# Patient Record
Sex: FEMALE | Race: WHITE | Employment: OTHER | RURAL
[De-identification: names, ages, dates, MRNs, and addresses within clinical notes are randomized per-mention and may not be internally consistent; named-entity substitution may affect disease eponyms.]

---

## 2018-06-12 ENCOUNTER — OFFICE VISIT (OUTPATIENT)
Dept: CARDIOLOGY CLINIC | Age: 68
End: 2018-06-12

## 2018-06-12 VITALS
WEIGHT: 180 LBS | SYSTOLIC BLOOD PRESSURE: 158 MMHG | DIASTOLIC BLOOD PRESSURE: 94 MMHG | HEIGHT: 64 IN | HEART RATE: 78 BPM | RESPIRATION RATE: 14 BRPM | BODY MASS INDEX: 30.73 KG/M2 | OXYGEN SATURATION: 97 %

## 2018-06-12 DIAGNOSIS — I10 HYPERTENSION, UNSPECIFIED TYPE: ICD-10-CM

## 2018-06-12 DIAGNOSIS — R07.89 OTHER CHEST PAIN: Primary | ICD-10-CM

## 2018-06-12 DIAGNOSIS — E78.5 DYSLIPIDEMIA: ICD-10-CM

## 2018-06-12 RX ORDER — AMLODIPINE BESYLATE 2.5 MG/1
2.5 TABLET ORAL 2 TIMES DAILY
Qty: 180 TAB | Refills: 1 | Status: SHIPPED | OUTPATIENT
Start: 2018-06-12 | End: 2019-02-01 | Stop reason: SDUPTHER

## 2018-06-12 RX ORDER — DIPHENHYDRAMINE HCL 25 MG
25 CAPSULE ORAL
COMMUNITY

## 2018-06-12 RX ORDER — LEVOTHYROXINE SODIUM 75 UG/1
50 TABLET ORAL
COMMUNITY
End: 2022-04-06 | Stop reason: DRUGHIGH

## 2018-06-12 RX ORDER — ROSUVASTATIN CALCIUM 10 MG/1
10 TABLET, COATED ORAL
COMMUNITY
End: 2022-02-22 | Stop reason: SINTOL

## 2018-06-12 RX ORDER — TRISODIUM CITRATE DIHYDRATE AND CITRIC ACID MONOHYDRATE 500; 334 MG/5ML; MG/5ML
20 SOLUTION ORAL 2 TIMES DAILY
COMMUNITY
End: 2019-07-15 | Stop reason: ALTCHOICE

## 2018-06-12 NOTE — MR AVS SNAPSHOT
[facility] 
 
 
 [address] [facility] [address] [phone] Patient: Lynnette Mclaughlin MRN: [id number] Visit Information Date & Time Provider Department Dept. Phone Encounter #  
 6/12/2018 10:20 AM Kristen Kaplan, [address] [place] [address] Cardiology [facility] [phone] [id number] Upcoming Health Maintenance Date Due Hepatitis C Screening 1950 DTaP/Tdap/Td series (1 - Tdap) 1/23/1972 FOBT Q 1 YEAR AGE 50-75 1/23/2001 ZOSTER VACCINE AGE 60> 11/23/2010 GLAUCOMA SCREENING Q2Y 1/23/2016 Bone Densitometry (Dexa) Screening 1/23/2016 Pneumococcal 65+ Low/Medium Risk (1 of 2 - PCV13) 1/23/2016 Influenza Age 5 to Adult 8/1/2018 BREAST CANCER SCRN MAMMOGRAM 5/25/2020 Allergies as of 6/12/2018  Review Complete On: 6/12/2018 By: Kristen Kaplan MD  
  
 Severity Noted Reaction Type Reactions Mold High 06/12/2018    Other (comments) Asthma symptoms Pcn [Penicillins] High 04/07/2017    Anaphylaxis Current Immunizations  Never Reviewed No immunizations on file. Not reviewed this visit You Were Diagnosed With   
  
 Codes Comments Other chest pain    -  Primary ICD-10-CM: R07.89 ICD-9-CM: 786.59 Hypertension, unspecified type     ICD-10-CM: I10 
ICD-9-CM: 401.9 Dyslipidemia     ICD-10-CM: E78.5 ICD-9-CM: 272.4 Vitals BP Pulse Resp Height(growth percentile) Weight(growth percentile) SpO2  
 (!) 158/94 (BP 1 Location: Right arm, BP Patient Position: Sitting) 78 14 5' 3.5\" (1.613 m) 180 lb (81.6 kg) 97% BMI OB Status Smoking Status 31.39 kg/m2 Hysterectomy Former Smoker Vitals History BMI and BSA Data Body Mass Index Body Surface Area  
 31.39 kg/m 2 1.91 m 2 Preferred Pharmacy Pharmacy Name Phone [address] [place] [address] [phone] Your Updated Medication List  
  
 This list is accurate as of 6/12/18 11:39 AM.  Always use your most recent med list.  
  
  
  
  
 albuterol 2.5 mg /3 mL (0.083 %) nebulizer solution Commonly known as:  PROVENTIL VENTOLIN  
2.5 mg by Nebulization route every four (4) hours as needed for Wheezing. AMBIEN 5 mg tablet Generic drug:  zolpidem Take 10 mg by mouth nightly as needed for Sleep. amLODIPine 2.5 mg tablet Commonly known as:  John Matar Take 1 Tab by mouth two (2) times a day. aspirin 325 mg tablet Commonly known as:  ASPIRIN Take 400 mg by mouth daily. Anacin BENADRYL 25 mg capsule Generic drug:  diphenhydrAMINE Take 25 mg by mouth every six (6) hours as needed. CO Q-10 PO Take 200 mg by mouth daily. CRESTOR 10 mg tablet Generic drug:  rosuvastatin Take 10 mg by mouth nightly. cyclobenzaprine 5 mg tablet Commonly known as:  FLEXERIL Take 5 mg by mouth two (2) times daily as needed for Muscle Spasm(s). estropipate 0.75 mg tablet Commonly known as:  OGEN Take 0.75 mg by mouth daily. HYDROcodone-acetaminophen 5-325 mg per tablet Commonly known as:  Billye Peru Take 1-2 Tabs by mouth every six (6) hours as needed for Pain.  
  
 levothyroxine 75 mcg tablet Commonly known as:  SYNTHROID Take  by mouth Daily (before breakfast). montelukast 10 mg tablet Commonly known as:  SINGULAIR Take 10 mg by mouth daily. multivitamin tablet Commonly known as:  ONE A DAY Take 1 Tab by mouth daily. Pure Multivitamin  
  
 pilocarpine 5 mg tablet Commonly known as:  Clayton Croak Take 5 mg by mouth three (3) times daily. sodium citrate-citric acid 500-334 mg/5 mL solution Commonly known as:  PrismaStar Take 20 mL by mouth two (2) times a day. topiramate 25 mg tablet Commonly known as:  TOPAMAX Take 25 mg by mouth three (3) times daily. Prescriptions Sent to Pharmacy  Refills  
 amLODIPine (NORVASC) 2.5 mg tablet 1  
 Sig: Take 1 Tab by mouth two (2) times a day. Class: Normal  
 Pharmacy: [facility], [address] [place] [address] #: [phone] Route: Oral  
  
We Performed the Following AMB POC EKG ROUTINE W/ 12 LEADS, INTER & REP [49768 CPT(R)] Introducing \[facility]\"" & HEALTH SERVICES! Lenore Pettit introduces [email or web address] patient portal. Now you can access parts of your medical record, email your doctor's office, and request medication refills online. 1. In your internet browser, go to [email or web address] 2. Click on the First Time User? Click Here link in the Sign In box. You will see the New Member Sign Up page. 3. Enter your [employer] Access Code exactly as it appears below. You will not need to use this code after youve completed the sign-up process. If you do not sign up before the expiration date, you must request a new code. · [email or web address] Access Code: [email or web address] Expires: 6/17/2018 10:20 AM 
 
4. Enter the last four digits of your Social Security Number (xxxx) and Date of Birth (mm/dd/yyyy) as indicated and click Submit. You will be taken to the next sign-up page. 5. Create a [employer] ID. This will be your [employer] login ID and cannot be changed, so think of one that is secure and easy to remember. 6. Create a [employer] password. You can change your password at any time. 7. Enter your Password Reset Question and Answer. This can be used at a later time if you forget your password. 8. Enter your e-mail address. You will receive e-mail notification when new information is available in [address] 9. Click Sign Up. You can now view and download portions of your medical record. 10. Click the Download Summary menu link to download a portable copy of your medical information. If you have questions, please visit the Frequently Asked Questions section of the [email or web address] website. Remember, [employer]me is NOT to be used for urgent needs. For medical emergencies, dial 911. Now available from your iPhone and Android! Please provide this summary of care documentation to your next provider. Your primary care clinician is listed as June B [facility]. If you have any questions after today's visit, please call [phone].

## 2018-06-12 NOTE — PROGRESS NOTES
Malu Shell is a 80 y.o. female is here for cardiac evaluation. Hx hypertension, DJD, asthma with cervical pain, intermittent non-radiating CP--non-exertional  Prior Stress MPI 2011 abnormal with subsequent cardiac cath 2011 neg for CAD. Seen by Dr Brandin Chavez 5/29/18--labs sent (CBC, CMP, lipids--chol 226, , HDL 64, ), thyroids, CPK, trop--normal); Some intolerance to statins in past (lovastatin, crestor) with myalgias, currently back on Crestor 10mg. Echo 5/23/18 with LVEF 29-54, grade I diastolic, valves ok. Carotid dopplers 5/23/18 with mild bilateral ICA plaque (16-49%), antegrade vertebrals. Stress MPI 6/4/18 Jose Maria treadmill 3:51, , chest pain prior and throughout test with no changes, no ischemic ST changes,  normal perfusion/no ischemia, LVEF 70. +FH CAD, CVA, hypertension, dyslipidemia. The patient denies  shortness of breath, orthopnea, PND, LE edema, palpitations, syncope, presyncope or fatigue.        Patient Active Problem List    Diagnosis Date Noted    Other chest pain 06/12/2018    Hypertension     Dyslipidemia       June B MD Diana  Past Medical History:   Diagnosis Date    Arthritis     Asthma     Chronic pain     Dyslipidemia     Hypertension     Lumbar disc disease     Lumbar spondylosis     Menopause     Migraine headache     Thyroid disease       Past Surgical History:   Procedure Laterality Date    HX APPENDECTOMY  1977    HX CERVICAL LAMINECTOMY  1989, 2005    HX GYN      HX HYSTERECTOMY      HX OOPHORECTOMY      HX ORTHOPAEDIC  2013, 2014    back surgery; wrist surgery      Allergies   Allergen Reactions    Mold Other (comments)     Asthma symptoms    Pcn [Penicillins] Anaphylaxis      Family History   Problem Relation Age of Onset    Stroke Mother     Hypertension Mother     Dementia Mother     High Cholesterol Mother     Heart Disease Father     Hypertension Father     Kidney Disease Father     High Cholesterol Father     Hypertension Sister     Stroke Sister      [employer]      Social History     Social History    Marital status:      Spouse name: N/A    Number of children: N/A    Years of education: N/A     Occupational History    Not on file. Social History Main Topics    Smoking status: Former Smoker     Quit date: 12/7/2013    Smokeless tobacco: Never Used    Alcohol use 3.0 oz/week     5 Glasses of wine per week    Drug use: Not on file    Sexual activity: Not on file     Other Topics Concern    Not on file     Social History Narrative      Current Outpatient Prescriptions   Medication Sig    estropipate (OGEN) 0.75 mg tablet Take 0.75 mg by mouth daily.  levothyroxine (SYNTHROID) 75 mcg tablet Take  by mouth Daily (before breakfast).  sodium citrate-citric acid (BICITRA) 500-334 mg/5 mL solution Take 20 mL by mouth two (2) times a day.  ubidecarenone (CO Q-10 PO) Take 200 mg by mouth daily.  rosuvastatin (CRESTOR) 10 mg tablet Take 10 mg by mouth nightly.  diphenhydrAMINE (BENADRYL) 25 mg capsule Take 25 mg by mouth every six (6) hours as needed.  amLODIPine (NORVASC) 2.5 mg tablet Take 2.5 mg by mouth daily.  pilocarpine (SALAGEN) 5 mg tablet Take 5 mg by mouth three (3) times daily.  aspirin (ASPIRIN) 325 mg tablet Take 400 mg by mouth daily. Anacin    montelukast (SINGULAIR) 10 mg tablet Take 10 mg by mouth daily.  topiramate (TOPAMAX) 25 mg tablet Take 25 mg by mouth three (3) times daily.  HYDROcodone-acetaminophen (NORCO) 5-325 mg per tablet Take 1-2 Tabs by mouth every six (6) hours as needed for Pain.  albuterol (PROVENTIL VENTOLIN) 2.5 mg /3 mL (0.083 %) nebulizer solution 2.5 mg by Nebulization route every four (4) hours as needed for Wheezing.  cyclobenzaprine (FLEXERIL) 5 mg tablet Take 5 mg by mouth two (2) times daily as needed for Muscle Spasm(s).  zolpidem (AMBIEN) 5 mg tablet Take 10 mg by mouth nightly as needed for Sleep.     multivitamin (ONE A DAY) tablet Take 1 Tab by mouth daily. Pure Multivitamin     No current facility-administered medications for this visit. Review of Symptoms:    CONST  No weight change. No fever, chills, sweats    ENT No visual changes, URI sx, sore throat    CV  See HPI   RESP  No cough, or sputum, wheezing. Also see HPI   GI  No abdominal pain or change in bowel habits. No heartburn or dysphagia. No melena or rectal bleeding.   No dysuria, urgency, frequency, hematuria   MSKEL  No joint pain, swelling. No muscle pain. SKIN  No rash or lesions. NEURO  No headache, syncope, or seizure. No weakness, loss of sensation, or paresthesias. PSYCH  No low mood or depression  No anxiety. HE/LYMPH  No easy bruising, abnormal bleeding, or enlarged glands.         Physical ExamPhysical Exam:    Visit Vitals    BP (!) 158/94 (BP 1 Location: Right arm, BP Patient Position: Sitting)    Pulse 78    Resp 14    Ht 5' 3.5\" (1.613 m)    Wt 180 lb (81.6 kg)    SpO2 97%    BMI 31.39 kg/m2     Gen: NAD  HEENT:  PERRL, throat clear  Neck: no adenopathy, no thyromegaly, no JVD   Heart:  Regular,Nl S1S2,  no murmur, gallop or rub.   Lungs:  clear  Abdomen:   Soft, non-tender, bowel sounds are active.   Extremities:  No edema  Pulse: symmetric  Neuro: A&O times 3, No focal neuro deficits    Cardiographics    ECG: NSR, no acute changes      Labs:   Lab Results   Component Value Date/Time    Sodium 138 04/07/2017 02:15 PM    Potassium 4.0 04/07/2017 02:15 PM    Chloride 101 04/07/2017 02:15 PM    CO2 27 04/07/2017 02:15 PM    Anion gap 10 04/07/2017 02:15 PM    Glucose 123 (H) 04/07/2017 02:15 PM    BUN 19 (H) 07/20/2017 08:30 AM    BUN 25 (H) 04/07/2017 02:15 PM    Creatinine 1.05 (H) 10/16/2017 12:10 PM    Creatinine 1.13 (H) 07/20/2017 08:30 AM    Creatinine 1.23 (H) 04/07/2017 02:15 PM    BUN/Creatinine ratio 20 04/07/2017 02:15 PM    GFR est AA >60 10/16/2017 12:10 PM    GFR est AA 58 (L) 07/20/2017 08:30 AM    GFR est AA 53 (L) 04/07/2017 02:15 PM    GFR est non-AA 52 (L) 10/16/2017 12:10 PM    GFR est non-AA 48 (L) 07/20/2017 08:30 AM    GFR est non-AA 44 (L) 04/07/2017 02:15 PM    Calcium 8.8 10/16/2017 12:10 PM    Calcium 9.4 04/07/2017 02:15 PM     Lab Results   Component Value Date/Time    CK 56 05/14/2018 10:41 AM         Assessment:         Patient Active Problem List    Diagnosis Date Noted    Other chest pain 06/12/2018    Hypertension     Dyslipidemia      Hx hypertension, DJD, asthma with cervical pain, intermittent non-radiating CP--non-exertional  Prior Stress MPI 2011 abnormal with subsequent cardiac cath 2011 neg for CAD. Seen by Dr Catina Puentes 5/29/18--labs sent (CBC, CMP, lipids--chol 226, , HDL 64, ), thyroids, CPK, trop); Some intolerance to statins in past (lovastatin, crestor) with myalgias, currently back on Crestor 10mg. Echo 5/23/18 with LVEF 65-66, grade I diastolic, valves ok. Carotid dopplers 5/23/18 with mild bilateral ICA plaque (16-49%), antegrade vertebrals. Stress MPI 6/4/18 Jose Maria treadmill 3:51, , chest pain prior and throughout test with no changes, no ischemic ST changes,  normal perfusion/no ischemia, LVEF 70. +FH CAD, CVA, hypertension, dyslipidemia. Plan:     R/o cervical radiculopathy  Cardiac w/u neg as noted  Dyslipidemia with mild carotid plaque, AV sclerosis, athero on prior CT--aggressive RF modification  Will increase the amlodipine to 2.5mg BID (currently on every day and morning spike in BP)  Has restarted Crestor (w/ coQ 10)--if unable to tolerate may be candidate for Repatha/PCK9 inhibitor)  Continue ASA  F/u with PCP  RTC 6 mos, sooner prn.     Eagle Cuevas MD

## 2018-06-12 NOTE — PROGRESS NOTES
PATIENT ID VERIFIED WITH TWO PATIENT IDENTIFIERS. PATIENT MEDICATIONS REVIEWED AND APPROVED BY DR. Maria Medina. MEDICATIONS THAT WERE REMOVED FROM THIS VISIT HAVE BEEN APPROVED BY DR. Maria Medina. Chief Complaint   Patient presents with    Chest Pain     New patient evaluation referred by Dr. Naveen Corona       1. Have you been to the ER, urgent care clinic since your last visit? Hospitalized since your last visit? No today is a new patient evaluation    2. Have you seen or consulted any other health care providers outside of the [address] [place] [address] since your last visit?  No today is a new patient evaluation

## 2018-11-07 PROBLEM — G45.9 TIA (TRANSIENT ISCHEMIC ATTACK): Status: ACTIVE | Noted: 2018-11-07

## 2018-12-19 ENCOUNTER — OFFICE VISIT (OUTPATIENT)
Dept: CARDIOLOGY CLINIC | Age: 68
End: 2018-12-19

## 2018-12-19 VITALS
RESPIRATION RATE: 12 BRPM | BODY MASS INDEX: 32.57 KG/M2 | OXYGEN SATURATION: 95 % | HEART RATE: 90 BPM | WEIGHT: 177 LBS | DIASTOLIC BLOOD PRESSURE: 100 MMHG | SYSTOLIC BLOOD PRESSURE: 164 MMHG | HEIGHT: 62 IN

## 2018-12-19 DIAGNOSIS — G45.9 TIA (TRANSIENT ISCHEMIC ATTACK): Primary | ICD-10-CM

## 2018-12-19 DIAGNOSIS — R00.2 PALPITATIONS: ICD-10-CM

## 2018-12-19 DIAGNOSIS — E78.5 DYSLIPIDEMIA: ICD-10-CM

## 2018-12-19 DIAGNOSIS — I10 ESSENTIAL HYPERTENSION: ICD-10-CM

## 2018-12-19 RX ORDER — METOPROLOL TARTRATE 25 MG/1
TABLET, FILM COATED ORAL
Qty: 180 TAB | Refills: 5 | Status: SHIPPED | OUTPATIENT
Start: 2018-12-19 | End: 2019-05-06 | Stop reason: SDUPTHER

## 2018-12-19 RX ORDER — METOPROLOL TARTRATE 25 MG/1
25 TABLET, FILM COATED ORAL 2 TIMES DAILY
Qty: 60 TAB | Refills: 5 | Status: SHIPPED | OUTPATIENT
Start: 2018-12-19 | End: 2018-12-19 | Stop reason: SDUPTHER

## 2018-12-19 NOTE — PROGRESS NOTES
Eliazar Roman is a 80 y.o. female is here for routine f/u. Hx hypertension, DJD, asthma with cervical pain, intermittent non-radiating CP--non-exertional  Prior Stress MPI 2011 abnormal with subsequent cardiac cath 2011 neg for CAD. Seen by Dr Edwige Miranda 5/29/18--labs sent (CBC, CMP, lipids--chol 226, , HDL 64, ), thyroids, CPK, trop--normal); Some intolerance to statins in past (lovastatin, crestor) with myalgias, currently back on Crestor 10mg. Echo 5/23/18 with LVEF 89-15, grade I diastolic, valves ok. Carotid dopplers 5/23/18 with mild bilateral ICA plaque (16-49%), antegrade vertebrals. Stress MPI 6/4/18 Jose Maria treadmill 3:51, , chest pain prior and throughout test with no changes, no ischemic ST changes,  normal perfusion/no ischemia, LVEF 70. +FH CAD, CVA, hypertension, dyslipidemia. Had TIA last month--admitted to [facility], neg w/u, sx resolved. The patient denies chest pain/ shortness of breath, orthopnea, PND, LE edema, palpitations, syncope, presyncope or fatigue.        Patient Active Problem List    Diagnosis Date Noted    TIA (transient ischemic attack) 11/07/2018    Other chest pain 06/12/2018    Hypertension     Dyslipidemia       Antoinette Ortiz MD  Past Medical History:   Diagnosis Date    Arthritis     Asthma     Chronic pain     Dyslipidemia     Hypertension     Lumbar disc disease     Lumbar spondylosis     Menopause     Migraine headache     Thyroid disease       Past Surgical History:   Procedure Laterality Date    HX APPENDECTOMY  47    HX CERVICAL LAMINECTOMY  1989, 2005    HX GYN      HX HYSTERECTOMY      HX OOPHORECTOMY      HX ORTHOPAEDIC  2013, 2014    back surgery; wrist surgery      Allergies   Allergen Reactions    Mold Other (comments)     Asthma symptoms    Pcn [Penicillins] Anaphylaxis    Ibuprofen Rash      Family History   Problem Relation Age of Onset    Stroke Mother     Hypertension Mother     Dementia Mother    [facility] Cholesterol Mother     Heart Disease Father     Hypertension Father     Kidney Disease Father     High Cholesterol Father     Hypertension Sister     Stroke Sister         [employer]      Social History     Socioeconomic History    Marital status:      Spouse name: Not on file    Number of children: Not on file    Years of education: Not on file    Highest education level: Not on file   Social Needs    Financial resource strain: Not on file    Food insecurity - worry: Not on file    Food insecurity - inability: Not on file   [employer] needs - medical: Not on file   [employer] needs - non-medical: Not on file   Occupational History    Not on file   Tobacco Use    Smoking status: Former Smoker     Last attempt to quit: 2013     Years since quittin.0    Smokeless tobacco: Never Used   Substance and Sexual Activity    Alcohol use: Yes     Alcohol/week: 3.0 oz     Types: 5 Glasses of wine per week    Drug use: Not on file    Sexual activity: Not on file   Other Topics Concern    Not on file   Social History Narrative    Not on file      Current Outpatient Medications   Medication Sig    aspirin delayed-release 81 mg tablet Take 1 Tab by mouth daily.  clopidogrel (PLAVIX) 75 mg tab Take 1 Tab by mouth daily.  estropipate (OGEN) 0.75 mg tablet Take 0.75 mg by mouth every fourty-eight (48) hours.  levothyroxine (SYNTHROID) 75 mcg tablet Take  by mouth Daily (before breakfast).  sodium citrate-citric acid (BICITRA) 500-334 mg/5 mL solution Take 20 mL by mouth two (2) times a day.  ubidecarenone (CO Q-10 PO) Take 200 mg by mouth daily.  diphenhydrAMINE (BENADRYL) 25 mg capsule Take 25 mg by mouth every six (6) hours as needed.  amLODIPine (NORVASC) 2.5 mg tablet Take 1 Tab by mouth two (2) times a day.  pilocarpine (SALAGEN) 5 mg tablet Take 5 mg by mouth three (3) times daily.  montelukast (SINGULAIR) 10 mg tablet Take 10 mg by mouth daily.     topiramate (TOPAMAX) 25 mg tablet Take 50 mg by mouth nightly.  albuterol (PROVENTIL VENTOLIN) 2.5 mg /3 mL (0.083 %) nebulizer solution 2.5 mg by Nebulization route every four (4) hours as needed for Wheezing.  cyclobenzaprine (FLEXERIL) 5 mg tablet Take 5 mg by mouth two (2) times daily as needed for Muscle Spasm(s).  zolpidem (AMBIEN) 5 mg tablet Take 10 mg by mouth nightly as needed for Sleep.  multivitamin (ONE A DAY) tablet Take 1 Tab by mouth daily. Pure Multivitamin    metoprolol tartrate (LOPRESSOR) 25 mg tablet Take 1 Tab by mouth two (2) times a day.  rosuvastatin (CRESTOR) 10 mg tablet Take 10 mg by mouth nightly. No current facility-administered medications for this visit. Review of Symptoms:    CONST  No weight change. No fever, chills, sweats    ENT No visual changes, URI sx, sore throat    CV  See HPI   RESP  No cough, or sputum, wheezing. Also see HPI   GI  No abdominal pain or change in bowel habits. No heartburn or dysphagia. No melena or rectal bleeding.   No dysuria, urgency, frequency, hematuria   MSKEL  No joint pain, swelling. No muscle pain. SKIN  No rash or lesions. NEURO  No headache, syncope, or seizure. No weakness, loss of sensation, or paresthesias. PSYCH  No low mood or depression  No anxiety. HE/LYMPH  No easy bruising, abnormal bleeding, or enlarged glands.         Physical ExamPhysical Exam:    Visit Vitals  BP (!) 164/100 (BP 1 Location: Left arm, BP Patient Position: Sitting)   Pulse 90   Resp 12   Ht 5' 2\" (1.575 m)   Wt 177 lb (80.3 kg)   SpO2 95%   BMI 32.37 kg/m²     Gen: NAD  HEENT:  PERRL, throat clear  Neck: no adenopathy, no thyromegaly, no JVD   Heart:  Regular,Nl S1S2,  no murmur, gallop or rub.   Lungs:  clear  Abdomen:   Soft, non-tender, bowel sounds are active.   Extremities:  No edema  Pulse: symmetric  Neuro: A&O times 3, No focal neuro deficits    Cardiographics      Lab Results   Component Value Date/Time Sodium 139 11/07/2018 11:36 AM    Sodium 138 04/07/2017 02:15 PM    Potassium 3.4 (L) 11/07/2018 11:36 AM    Potassium 4.0 04/07/2017 02:15 PM    Chloride 102 11/07/2018 11:36 AM    Chloride 101 04/07/2017 02:15 PM    CO2 25 11/07/2018 11:36 AM    CO2 27 04/07/2017 02:15 PM    Anion gap 12 11/07/2018 11:36 AM    Anion gap 10 04/07/2017 02:15 PM    Glucose 107 (H) 11/07/2018 11:36 AM    Glucose 123 (H) 04/07/2017 02:15 PM    BUN 20 11/07/2018 11:36 AM    BUN 19 (H) 07/20/2017 08:30 AM    BUN 25 (H) 04/07/2017 02:15 PM    Creatinine 1.06 (H) 11/07/2018 11:36 AM    Creatinine 1.05 (H) 10/16/2017 12:10 PM    Creatinine 1.13 (H) 07/20/2017 08:30 AM    Creatinine 1.23 (H) 04/07/2017 02:15 PM    BUN/Creatinine ratio 19 11/07/2018 11:36 AM    BUN/Creatinine ratio 20 04/07/2017 02:15 PM    GFR est AA >60 11/07/2018 11:36 AM    GFR est AA >60 10/16/2017 12:10 PM    GFR est AA 58 (L) 07/20/2017 08:30 AM    GFR est AA 53 (L) 04/07/2017 02:15 PM    GFR est non-AA 52 (L) 11/07/2018 11:36 AM    GFR est non-AA 52 (L) 10/16/2017 12:10 PM    GFR est non-AA 48 (L) 07/20/2017 08:30 AM    GFR est non-AA 44 (L) 04/07/2017 02:15 PM    Calcium 9.2 11/07/2018 11:36 AM    Calcium 8.8 10/16/2017 12:10 PM    Calcium 9.4 04/07/2017 02:15 PM    Bilirubin, total 0.3 11/07/2018 11:36 AM    AST (SGOT) 22 11/07/2018 11:36 AM    Alk. phosphatase 86 11/07/2018 11:36 AM    Protein, total 8.0 11/07/2018 11:36 AM    Albumin 4.4 11/07/2018 11:36 AM    Globulin 3.6 11/07/2018 11:36 AM    A-G Ratio 1.2 11/07/2018 11:36 AM    ALT (SGPT) 31 11/07/2018 11:36 AM     Lab Results   Component Value Date/Time    CK 56 05/14/2018 10:41 AM     Lab Results   Component Value Date/Time    Cholesterol, total 158 11/08/2018 05:30 AM    HDL Cholesterol 63 11/08/2018 05:30 AM    LDL, calculated 73.2 11/08/2018 05:30 AM    Triglyceride 109 11/08/2018 05:30 AM    CHOL/HDL Ratio 2.5 11/08/2018 05:30 AM     No results found for this or any previous visit.     Assessment: Patient Active Problem List    Diagnosis Date Noted    TIA (transient ischemic attack) 11/07/2018    Other chest pain 06/12/2018    Hypertension     Dyslipidemia       Hx hypertension, DJD, asthma with cervical pain, intermittent non-radiating CP--non-exertional  Prior Stress MPI 2011 abnormal with subsequent cardiac cath 2011 neg for CAD. Seen by Dr Ravindra Ramirez 5/29/18--labs sent (CBC, CMP, lipids--chol 226, , HDL 64, ), thyroids, CPK, trop--normal); Some intolerance to statins in past (lovastatin, crestor) with myalgias, currently back on Crestor 10mg. Echo 5/23/18 with LVEF 22-81, grade I diastolic, valves ok. Carotid dopplers 5/23/18 with mild bilateral ICA plaque (16-49%), antegrade vertebrals. Stress MPI 6/4/18 Jose Maria treadmill 3:51, , chest pain prior and throughout test with no changes, no ischemic ST changes,  normal perfusion/no ischemia, LVEF 70. +FH CAD, CVA, hypertension, dyslipidemia. Had TIA last month--admitted to [facility], neg w/u, sx resolved. Plan:     Pt has stopped statin--myalgias, better off Crestor 10mg--will try restarting at 5mg dose, see if she tolerates  Not taking metoprolol yet (rx'd, but not yet started)--will go ahead and start  Continue amlodipine 2.5mg bid  Home BP monitoring  NO afib known, and neg telemetry--will check Holter monitor x 24 hrs. Lipids and labs followed by PCP--f/u as planned. Continue current care and f/u in 6 months.     Christ Pérez MD

## 2018-12-19 NOTE — PROGRESS NOTES
PATIENT ID VERIFIED WITH TWO PATIENT IDENTIFIERS. PATIENT MEDICATIONS REVIEWED AND APPROVED BY DR. Diane Aguilar. MEDICATIONS THAT WERE REMOVED FROM THIS VISIT HAVE BEEN APPROVED BY DR. Dinae Aguilar. NOT TAKING:  METOPROLOL, CRESTOR    Chief Complaint   Patient presents with    TIA     6 MO F/U    Hypertension       1. Have you been to the ER, urgent care clinic since your last visit? Hospitalized since your last visit? YES  [facility] -24 hr observation 11/7/18 for TIA    2. Have you seen or consulted any other health care providers outside of the [address] [place] [address] since your last visit? Include any pap smears or colon screening.  Yes Neuro--Dr. Tami Kincaid in [place], [place] f/u TIA 11/26/18 and PCP Dr. Niesha Lutz 11/19/18 for TIA f/u

## 2018-12-20 ENCOUNTER — CLINICAL SUPPORT (OUTPATIENT)
Dept: CARDIOLOGY CLINIC | Age: 68
End: 2018-12-20

## 2018-12-20 DIAGNOSIS — R00.2 PALPITATIONS: ICD-10-CM

## 2018-12-20 NOTE — PROGRESS NOTES
24 hour Holter monitor only. Verified patient with two patient identifiers. Pt verbalized understanding of its use. Ordering ONUR Younger  Reason: palpitations, r/o afib (TIA)  Start time: 4:00pm  Return date: 12/21/18        No LOS.

## 2018-12-26 ENCOUNTER — DOCUMENTATION ONLY (OUTPATIENT)
Dept: CARDIOLOGY CLINIC | Age: 68
End: 2018-12-26

## 2019-01-08 ENCOUNTER — TELEPHONE (OUTPATIENT)
Dept: CARDIOLOGY CLINIC | Age: 69
End: 2019-01-08

## 2019-01-08 NOTE — TELEPHONE ENCOUNTER
----- Message from Preston Herndon MD sent at 1/7/2019  2:41 PM EST -----  Regarding: Holter  Advise Holter monitor shows occasional skipped beats (PAC\"s and PVC's)--no afib. No concerns. Thanks [employer] [place]    Spoke with the patient. Verified patient with two patient identifiers. Results given and questions answered. METOPROLOL is helping her BP. Advised that she continue. F/U in July 2019. Patient verbalized understanding.

## 2019-02-04 RX ORDER — AMLODIPINE BESYLATE 2.5 MG/1
TABLET ORAL
Qty: 180 TAB | Refills: 1 | Status: SHIPPED | OUTPATIENT
Start: 2019-02-04 | End: 2019-06-14 | Stop reason: SDUPTHER

## 2019-05-07 RX ORDER — METOPROLOL TARTRATE 25 MG/1
TABLET, FILM COATED ORAL
Qty: 180 TAB | Refills: 5 | Status: SHIPPED | OUTPATIENT
Start: 2019-05-07 | End: 2020-05-11

## 2019-06-14 RX ORDER — AMLODIPINE BESYLATE 2.5 MG/1
TABLET ORAL
Qty: 180 TAB | Refills: 1 | Status: SHIPPED | OUTPATIENT
Start: 2019-06-14 | End: 2019-11-18 | Stop reason: SDUPTHER

## 2019-07-15 ENCOUNTER — OFFICE VISIT (OUTPATIENT)
Dept: CARDIOLOGY CLINIC | Age: 69
End: 2019-07-15

## 2019-07-15 VITALS
RESPIRATION RATE: 16 BRPM | WEIGHT: 179 LBS | DIASTOLIC BLOOD PRESSURE: 80 MMHG | HEART RATE: 76 BPM | SYSTOLIC BLOOD PRESSURE: 124 MMHG | BODY MASS INDEX: 32.94 KG/M2 | OXYGEN SATURATION: 97 % | HEIGHT: 62 IN

## 2019-07-15 DIAGNOSIS — G45.9 TIA (TRANSIENT ISCHEMIC ATTACK): ICD-10-CM

## 2019-07-15 DIAGNOSIS — E78.5 DYSLIPIDEMIA: ICD-10-CM

## 2019-07-15 DIAGNOSIS — R07.89 OTHER CHEST PAIN: ICD-10-CM

## 2019-07-15 DIAGNOSIS — I10 ESSENTIAL HYPERTENSION: Primary | ICD-10-CM

## 2019-07-15 DIAGNOSIS — R00.2 PALPITATIONS: ICD-10-CM

## 2019-07-15 NOTE — LETTER
7/15/19 Patient: Kipp Spurling YOB: 1950 Date of Visit: 7/15/2019 Susan Read MD 
[address] [place] [id number] VIA Facsimile: [phone] Dear Susan Read MD, Thank you for referring Ms. Gokul Trujillo to [facility] for evaluation. My notes for this consultation are attached. If you have questions, please do not hesitate to call me. I look forward to following your patient along with you.  
 
 
Sincerely, 
 
Leelee Rodriges MD

## 2019-07-15 NOTE — PROGRESS NOTES
Verified patient with two patient identifiers. Medications reviewed/approved by Dr. Evonne Young. A verbal from Dr. Evonne Young was given to remove any medications that were deleted during the visit. Medication(s) removed:  sodium citrate-citric acid (BICITRA) 500-334 mg/5 mL solution   estropipate (OGEN) 0.75 mg tablet       Chief Complaint   Patient presents with    Hypertension     6 month follow up    Cholesterol Problem    TIA     (hx of)    Shoulder Pain     Left should pain with exertion. 1. Have you been to the ER, urgent care clinic since your last visit? Hospitalized since your last visit? no    2. Have you seen or consulted any other health care providers outside of the [address] [place] [address] since your last visit? Include any pap smears or colon screening. Yes, Dr. Carola Vargas neurologist - seen since last visit (TIA), DR. [facility] PCP FOR ROUTINE CARE, DR. Mei Albrecht FOR KNEE PAIN, DR. Chauncey Cardona - UROLOGIST FOR KIDNEY STONES.

## 2019-07-15 NOTE — PROGRESS NOTES
Doroteo Lopez is a 77 y.o. female is here for routine f/u. Hx hypertension, DJD, asthma with cervical pain, intermittent non-radiating CP--non-exertional  Prior Stress MPI 2011 abnormal with subsequent cardiac cath 2011 neg for CAD.  Seen by Dr Aaron Roper 5/29/18--labs sent (CBC, CMP, lipids--chol 226, , HDL 64, ), thyroids, CPK, trop--normal); Some intolerance to statins in past (lovastatin, crestor) with myalgias, currently back on Crestor 10mg--ok.  Echo 5/23/18 with LVEF 28-52, grade I diastolic, valves ok. Carotid dopplers 5/23/18 with mild bilateral ICA plaque (16-49%), antegrade vertebrals. Stress MPI 6/4/18 Jose Maria treadmill 3:51, , chest pain prior and throughout test with no changes, no ischemic ST changes,  normal perfusion/no ischemia, LVEF 70. +FH CAD, CVA, hypertension, dyslipidemia. Continues to see PCP--OV last month with labs, etc.  Chol 170/DLD 88/HDL 56/ 0n 6/10/19, free T$ 1.8(high), TSH 0.5, CMP/CBC normal.  Former smoker quit 2013. Holter monitor 1/19--NSR, rare ectopy, no afib. The patient denies chest pain/ shortness of breath, orthopnea, PND, LE edema, palpitations, syncope, presyncope or fatigue.        Patient Active Problem List    Diagnosis Date Noted    TIA (transient ischemic attack) 11/07/2018    Other chest pain 06/12/2018    Hypertension     Dyslipidemia       Antoinette Ortiz MD  Past Medical History:   Diagnosis Date    Arthritis     Asthma     Chronic pain     Dyslipidemia     Hypertension     Lumbar disc disease     Lumbar spondylosis     Menopause     Migraine headache     Thyroid disease       Past Surgical History:   Procedure Laterality Date    HX APPENDECTOMY  1977    HX CERVICAL LAMINECTOMY  1989, 2005    HX GYN      HX HYSTERECTOMY      HX OOPHORECTOMY      HX ORTHOPAEDIC  2013, 2014    back surgery; wrist surgery      Allergies   Allergen Reactions    Mold Other (comments)     Asthma symptoms    Pcn [Penicillins] Anaphylaxis    Ibuprofen Rash      Family History   Problem Relation Age of Onset   [employer] Stroke Mother     Hypertension Mother     Dementia Mother     High Cholesterol Mother     Heart Disease Father     Hypertension Father     Kidney Disease Father     High Cholesterol Father     Hypertension Sister     Stroke Sister         S[employer]ogrrk's      Social History     Socioeconomic History    Marital status:      Spouse name: Not on file    Number of children: Not on file    Years of education: Not on file    Highest education level: Not on file   Occupational History    Not on file   Social Needs    Financial resource strain: Not on file    Food insecurity:     Worry: Not on file     Inability: Not on file    Transportation needs:     Medical: Not on file     Non-medical: Not on file   Tobacco Use    Smoking status: Former Smoker     Last attempt to quit: 2013     Years since quittin.6    Smokeless tobacco: Never Used   Substance and Sexual Activity    Alcohol use:  Yes     Alcohol/week: 3.0 oz     Types: 5 Glasses of wine per week    Drug use: Not on file    Sexual activity: Not on file   Lifestyle    Physical activity:     Days per week: Not on file     Minutes per session: Not on file    Stress: Not on file   Relationships    Social connections:     Talks on phone: Not on file     Gets together: Not on file     Attends Scientologist service: Not on file     Active member of club or organization: Not on file     Attends meetings of clubs or organizations: Not on file     Relationship status: Not on file    Intimate partner violence:     Fear of current or ex partner: Not on file     Emotionally abused: Not on file     Physically abused: Not on file     Forced sexual activity: Not on file   Other Topics Concern    Not on file   Social History Narrative    Not on file      Current Outpatient Medications   Medication Sig    amLODIPine (NORVASC) 2.5 mg tablet TAKE 1 TABLET TWICE DAILY    metoprolol tartrate (LOPRESSOR) 25 mg tablet TAKE 1 TABLET TWICE DAILY    aspirin delayed-release 81 mg tablet Take 1 Tab by mouth daily.  clopidogrel (PLAVIX) 75 mg tab Take 1 Tab by mouth daily.  estropipate (OGEN) 0.75 mg tablet Take 0.75 mg by mouth every fourty-eight (48) hours.  levothyroxine (SYNTHROID) 75 mcg tablet Take  by mouth Daily (before breakfast).  sodium citrate-citric acid (BICITRA) 500-334 mg/5 mL solution Take 20 mL by mouth two (2) times a day.  ubidecarenone (CO Q-10 PO) Take 200 mg by mouth daily.  rosuvastatin (CRESTOR) 10 mg tablet Take 10 mg by mouth nightly.  diphenhydrAMINE (BENADRYL) 25 mg capsule Take 25 mg by mouth every six (6) hours as needed.  pilocarpine (SALAGEN) 5 mg tablet Take 5 mg by mouth three (3) times daily.  montelukast (SINGULAIR) 10 mg tablet Take 10 mg by mouth daily.  topiramate (TOPAMAX) 25 mg tablet Take 50 mg by mouth nightly.  albuterol (PROVENTIL VENTOLIN) 2.5 mg /3 mL (0.083 %) nebulizer solution 2.5 mg by Nebulization route every four (4) hours as needed for Wheezing.  cyclobenzaprine (FLEXERIL) 5 mg tablet Take 5 mg by mouth two (2) times daily as needed for Muscle Spasm(s).  zolpidem (AMBIEN) 5 mg tablet Take 10 mg by mouth nightly as needed for Sleep.  multivitamin (ONE A DAY) tablet Take 1 Tab by mouth daily. Pure Multivitamin     No current facility-administered medications for this visit. Review of Symptoms:    CONST  No weight change. No fever, chills, sweats    ENT No visual changes, URI sx, sore throat    CV  See HPI   RESP  No cough, or sputum, wheezing. Also see HPI   GI  No abdominal pain or change in bowel habits. No heartburn or dysphagia. No melena or rectal bleeding.   No dysuria, urgency, frequency, hematuria   MSKEL  No joint pain, swelling. No muscle pain. SKIN  No rash or lesions. NEURO  No headache, syncope, or seizure. No weakness, loss of sensation, or paresthesias. PSYCH  No low mood or depression  No anxiety. HE/LYMPH  No easy bruising, abnormal bleeding, or enlarged glands.         Physical ExamPhysical Exam:    Visit Vitals  Ht 5' 2\" (1.575 m)   BMI 32.37 kg/m²     Gen: NAD  HEENT:  PERRL, throat clear  Neck: no adenopathy, no thyromegaly, no JVD   Heart:  Regular,Nl S1S2,  no murmur, gallop or rub.   Lungs:  clear  Abdomen:   Soft, non-tender, bowel sounds are active.   Extremities:  No edema  Pulse: symmetric  Neuro: A&O times 3, No focal neuro deficits    Cardiographics    ECG: normal EKG, normal sinus rhythm, unchanged from previous tracings    Labs:   Lab Results   Component Value Date/Time    Sodium 139 11/07/2018 11:36 AM    Sodium 138 04/07/2017 02:15 PM    Potassium 3.4 (L) 11/07/2018 11:36 AM    Potassium 4.0 04/07/2017 02:15 PM    Chloride 102 11/07/2018 11:36 AM    Chloride 101 04/07/2017 02:15 PM    CO2 25 11/07/2018 11:36 AM    CO2 27 04/07/2017 02:15 PM    Anion gap 12 11/07/2018 11:36 AM    Anion gap 10 04/07/2017 02:15 PM    Glucose 107 (H) 11/07/2018 11:36 AM    Glucose 123 (H) 04/07/2017 02:15 PM    BUN 20 11/07/2018 11:36 AM    BUN 19 (H) 07/20/2017 08:30 AM    BUN 25 (H) 04/07/2017 02:15 PM    Creatinine 1.06 (H) 11/07/2018 11:36 AM    Creatinine 1.05 (H) 10/16/2017 12:10 PM    Creatinine 1.13 (H) 07/20/2017 08:30 AM    Creatinine 1.23 (H) 04/07/2017 02:15 PM    BUN/Creatinine ratio 19 11/07/2018 11:36 AM    BUN/Creatinine ratio 20 04/07/2017 02:15 PM    GFR est AA >60 11/07/2018 11:36 AM    GFR est AA >60 10/16/2017 12:10 PM    GFR est AA 58 (L) 07/20/2017 08:30 AM    GFR est AA 53 (L) 04/07/2017 02:15 PM    GFR est non-AA 52 (L) 11/07/2018 11:36 AM    GFR est non-AA 52 (L) 10/16/2017 12:10 PM    GFR est non-AA 48 (L) 07/20/2017 08:30 AM    GFR est non-AA 44 (L) 04/07/2017 02:15 PM    Calcium 9.2 11/07/2018 11:36 AM    Calcium 8.8 10/16/2017 12:10 PM    Calcium 9.4 04/07/2017 02:15 PM    Bilirubin, total 0.3 11/07/2018 11:36 AM    AST (SGOT) 22 11/07/2018 11:36 AM    Alk. phosphatase 86 11/07/2018 11:36 AM    Protein, total 8.0 11/07/2018 11:36 AM    Albumin 4.4 11/07/2018 11:36 AM    Globulin 3.6 11/07/2018 11:36 AM    A-G Ratio 1.2 11/07/2018 11:36 AM    ALT (SGPT) 31 11/07/2018 11:36 AM     Lab Results   Component Value Date/Time    CK 56 05/14/2018 10:41 AM     Lab Results   Component Value Date/Time    Cholesterol, total 158 11/08/2018 05:30 AM    HDL Cholesterol 63 11/08/2018 05:30 AM    LDL, calculated 73.2 11/08/2018 05:30 AM    Triglyceride 109 11/08/2018 05:30 AM    CHOL/HDL Ratio 2.5 11/08/2018 05:30 AM     No results found for this or any previous visit. Assessment:         Patient Active Problem List    Diagnosis Date Noted    TIA (transient ischemic attack) 11/07/2018    Other chest pain 06/12/2018    Hypertension     Dyslipidemia      Hx hypertension, DJD, asthma with cervical pain, intermittent non-radiating CP--non-exertional  Prior Stress MPI 2011 abnormal with subsequent cardiac cath 2011 neg for CAD.  Seen by Dr Carmela Abel 5/29/18--labs sent (CBC, CMP, lipids--chol 226, , HDL 64, ), thyroids, CPK, trop--normal); Some intolerance to statins in past (lovastatin, crestor) with myalgias, currently back on Crestor 10mg--ok.  Echo 5/23/18 with LVEF 30-72, grade I diastolic, valves ok. Carotid dopplers 5/23/18 with mild bilateral ICA plaque (16-49%), antegrade vertebrals. Stress MPI 6/4/18 [place] treadmill 3:51, , chest pain prior and throughout test with no changes, no ischemic ST changes,  normal perfusion/no ischemia, LVEF 70. +FH CAD, CVA, hypertension, dyslipidemia. Continues to see PCP--OV last month with labs, etc.  Chol 170/DLD 88/HDL 56/ 0n 6/10/19, free T$ 1.8(high), TSH 0.5, CMP/CBC normal.  Former smoker quit 2013. Holter monitor 1/19--NSR, rare ectopy, no afib. Plan:     Doing well with no adverse cardiac symptoms. Lipids and labs followed by PCP. Continue current care and f/u in 6 months.     Danny Santana Justice Gates MD

## 2020-01-28 ENCOUNTER — OFFICE VISIT (OUTPATIENT)
Dept: CARDIOLOGY CLINIC | Age: 70
End: 2020-01-28

## 2020-01-28 VITALS
WEIGHT: 182 LBS | BODY MASS INDEX: 33.49 KG/M2 | RESPIRATION RATE: 14 BRPM | SYSTOLIC BLOOD PRESSURE: 110 MMHG | HEART RATE: 73 BPM | OXYGEN SATURATION: 96 % | DIASTOLIC BLOOD PRESSURE: 64 MMHG | HEIGHT: 62 IN

## 2020-01-28 DIAGNOSIS — E78.5 DYSLIPIDEMIA: ICD-10-CM

## 2020-01-28 DIAGNOSIS — I10 ESSENTIAL HYPERTENSION: Primary | ICD-10-CM

## 2020-01-28 RX ORDER — HYDROCODONE BITARTRATE AND ACETAMINOPHEN 5; 325 MG/1; MG/1
TABLET ORAL AS NEEDED
COMMUNITY
End: 2020-08-11

## 2020-01-28 NOTE — PROGRESS NOTES
PATIENT ID VERIFIED WITH TWO PATIENT IDENTIFIERS. PATIENT MEDICATIONS REVIEWED AND APPROVED BY DR. Kevin Spain. REMOVED:  TOPAMAX    Chief Complaint   Patient presents with    Hypertension     6 month follow up       1. Have you been to the ER, urgent care clinic since your last visit? Hospitalized since your last visit? No    2. Have you seen or consulted any other health care providers outside of the [address] [place] [address] since your last visit? Include any pap smears or colon screening.  Yes PCP Dr. Misael Garcia Dec 2019 UTI and shingles, PT in [place], [place] today for hip and back pain

## 2020-01-28 NOTE — PROGRESS NOTES
Aniyah Garza is a 72 y.o. female is here for routine f/u. Hx hypertension, DJD, asthma with cervical pain, intermittent non-radiating CP--non-exertional  Prior Stress MPI 2011 abnormal with subsequent cardiac cath 2011 neg for CAD.  Seen by Dr Zach Goodson 5/29/18--labs sent (CBC, CMP, lipids--chol 226, , HDL 64, ), thyroids, CPK, trop--normal); Some intolerance to statins in past (lovastatin, crestor) with myalgias, currently back on Crestor 10mg--ok.  Echo 5/23/18 with LVEF 60-38, grade I diastolic, valves ok. Carotid dopplers 5/23/18 with mild bilateral ICA plaque (16-49%), antegrade vertebrals. Stress MPI 6/4/18 Jose Maria treadmill 3:51, , chest pain prior and throughout test with no changes, no ischemic ST changes,  normal perfusion/no ischemia, LVEF 70. +FH CAD, CVA, hypertension, dyslipidemia. Continues to see PCP--OV last month with labs, etc.  Chol 170/DLD 88/HDL 56/ 0n 6/10/19, free T$ 1.8(high), TSH 0.5, CMP/CBC normal.  Former smoker quit 2013. Holter monitor 1/19--NSR, rare ectopy, no afib. Recent shingles (rx'd), recent UTI's. The patient denies chest pain/ shortness of breath, orthopnea, PND, LE edema, palpitations, syncope, presyncope or fatigue.        Patient Active Problem List    Diagnosis Date Noted    TIA (transient ischemic attack) 11/07/2018    Other chest pain 06/12/2018    Hypertension     Dyslipidemia       Antoinette Ortiz MD  Past Medical History:   Diagnosis Date    Arthritis     Asthma     Chronic pain     Dyslipidemia     Hypertension     Lumbar disc disease     Lumbar spondylosis     Menopause     Migraine headache     Thyroid disease       Past Surgical History:   Procedure Laterality Date    HX APPENDECTOMY  1977    HX CERVICAL LAMINECTOMY  1989, 2005    HX GYN      HX HYSTERECTOMY      HX OOPHORECTOMY      HX ORTHOPAEDIC  2013, 2014    back surgery; wrist surgery      Allergies   Allergen Reactions    Mold Other (comments) Asthma symptoms    Pcn [Penicillins] Anaphylaxis    Ibuprofen Rash      Family History   Problem Relation Age of Onset    Stroke Mother     Hypertension Mother     Dementia Mother     High Cholesterol Mother     Heart Disease Father     Hypertension Father     Kidney Disease Father     High Cholesterol Father     Hypertension Sister     Stroke Sister         Sjogren's      Social History     Socioeconomic History    Marital status:      Spouse name: Not on file    Number of children: Not on file    Years of education: Not on file    Highest education level: Not on file   Occupational History    Not on file   Social Needs    Financial resource strain: Not on file    Food insecurity:     Worry: Not on file     Inability: Not on file    Transportation needs:     Medical: Not on file     Non-medical: Not on file   Tobacco Use    Smoking status: Former Smoker     Last attempt to quit: 2013     Years since quittin.1    Smokeless tobacco: Never Used   Substance and Sexual Activity    Alcohol use:  Yes     Alcohol/week: 5.0 standard drinks     Types: 5 Glasses of wine per week    Drug use: Not on file    Sexual activity: Not on file   Lifestyle    Physical activity:     Days per week: Not on file     Minutes per session: Not on file    Stress: Not on file   Relationships    Social connections:     Talks on phone: Not on file     Gets together: Not on file     Attends Anabaptist service: Not on file     Active member of club or organization: Not on file     Attends meetings of clubs or organizations: Not on file     Relationship status: Not on file    Intimate partner violence:     Fear of current or ex partner: Not on file     Emotionally abused: Not on file     Physically abused: Not on file     Forced sexual activity: Not on file   Other Topics Concern    Not on file   Social History Narrative    Not on file      Current Outpatient Medications   Medication Sig    HYDROcodone-acetaminophen (NORCO) 5-325 mg per tablet Take  by mouth as needed for Pain.  amLODIPine (NORVASC) 2.5 mg tablet TAKE 1 TABLET TWICE DAILY    metoprolol tartrate (LOPRESSOR) 25 mg tablet TAKE 1 TABLET TWICE DAILY    aspirin delayed-release 81 mg tablet Take 1 Tab by mouth daily.  clopidogrel (PLAVIX) 75 mg tab Take 1 Tab by mouth daily. (Patient taking differently: Take 75 mg by mouth every other day.)    levothyroxine (SYNTHROID) 75 mcg tablet Take 50 mcg by mouth Daily (before breakfast).  ubidecarenone (CO Q-10 PO) Take 200 mg by mouth daily.  rosuvastatin (CRESTOR) 10 mg tablet Take 10 mg by mouth nightly.  diphenhydrAMINE (BENADRYL) 25 mg capsule Take 25 mg by mouth every six (6) hours as needed.  pilocarpine (SALAGEN) 5 mg tablet Take 5 mg by mouth three (3) times daily.  montelukast (SINGULAIR) 10 mg tablet Take 10 mg by mouth daily.  albuterol (PROVENTIL VENTOLIN) 2.5 mg /3 mL (0.083 %) nebulizer solution 2.5 mg by Nebulization route every four (4) hours as needed for Wheezing.  cyclobenzaprine (FLEXERIL) 10 mg tablet Take 10 mg by mouth three (3) times daily as needed for Muscle Spasm(s).  zolpidem (AMBIEN) 10 mg tablet Take 5 mg by mouth nightly as needed for Sleep. Indications: Takes 5mg or 10mg as needed    multivitamin (ONE A DAY) tablet Take 1 Tab by mouth daily. Pure Multivitamin     No current facility-administered medications for this visit. Review of Symptoms:    CONST  No weight change. No fever, chills, sweats    ENT No visual changes, URI sx, sore throat    CV  See HPI   RESP  No cough, or sputum, wheezing. Also see HPI   GI  No abdominal pain or change in bowel habits. No heartburn or dysphagia. No melena or rectal bleeding.   No dysuria, urgency, frequency, hematuria   MSKEL  No joint pain, swelling. No muscle pain. SKIN  No rash or lesions. NEURO  No headache, syncope, or seizure.    No weakness, loss of sensation, or paresthesias. PSYCH  No low mood or depression  No anxiety. HE/LYMPH  No easy bruising, abnormal bleeding, or enlarged glands.         Physical ExamPhysical Exam:    Visit Vitals  /64 (BP 1 Location: Left arm, BP Patient Position: Sitting) Comment (BP Patient Position): LARGE CUFF   Pulse 73   Resp 14   Ht 5' 2\" (1.575 m)   Wt 182 lb (82.6 kg)   SpO2 96%   BMI 33.29 kg/m²     Gen: NAD  HEENT:  PERRL, throat clear  Neck: no adenopathy, no thyromegaly, no JVD   Heart:  Regular,Nl S1S2,  no murmur, gallop or rub.   Lungs:  clear  Abdomen:   Soft, non-tender, bowel sounds are active.   Extremities:  No edema  Pulse: symmetric  Neuro: A&O times 3, No focal neuro deficits    Cardiographics    Labs:   Lab Results   Component Value Date/Time    Sodium 139 11/07/2018 11:36 AM    Sodium 138 04/07/2017 02:15 PM    Potassium 3.4 (L) 11/07/2018 11:36 AM    Potassium 4.0 04/07/2017 02:15 PM    Chloride 102 11/07/2018 11:36 AM    Chloride 101 04/07/2017 02:15 PM    CO2 25 11/07/2018 11:36 AM    CO2 27 04/07/2017 02:15 PM    Anion gap 12 11/07/2018 11:36 AM    Anion gap 10 04/07/2017 02:15 PM    Glucose 107 (H) 11/07/2018 11:36 AM    Glucose 123 (H) 04/07/2017 02:15 PM    BUN 20 11/07/2018 11:36 AM    BUN 19 (H) 07/20/2017 08:30 AM    BUN 25 (H) 04/07/2017 02:15 PM    Creatinine 1.06 (H) 11/07/2018 11:36 AM    Creatinine 1.05 (H) 10/16/2017 12:10 PM    Creatinine 1.13 (H) 07/20/2017 08:30 AM    Creatinine 1.23 (H) 04/07/2017 02:15 PM    BUN/Creatinine ratio 19 11/07/2018 11:36 AM    BUN/Creatinine ratio 20 04/07/2017 02:15 PM    GFR est AA >60 11/07/2018 11:36 AM    GFR est AA >60 10/16/2017 12:10 PM    GFR est AA 58 (L) 07/20/2017 08:30 AM    GFR est AA 53 (L) 04/07/2017 02:15 PM    GFR est non-AA 52 (L) 11/07/2018 11:36 AM    GFR est non-AA 52 (L) 10/16/2017 12:10 PM    GFR est non-AA 48 (L) 07/20/2017 08:30 AM    GFR est non-AA 44 (L) 04/07/2017 02:15 PM    Calcium 9.2 11/07/2018 11:36 AM    Calcium 8.8 10/16/2017 12:10 PM    Calcium 9.4 04/07/2017 02:15 PM    Bilirubin, total 0.3 11/07/2018 11:36 AM    AST (SGOT) 22 11/07/2018 11:36 AM    Alk. phosphatase 86 11/07/2018 11:36 AM    Protein, total 8.0 11/07/2018 11:36 AM    Albumin 4.4 11/07/2018 11:36 AM    Globulin 3.6 11/07/2018 11:36 AM    A-G Ratio 1.2 11/07/2018 11:36 AM    ALT (SGPT) 31 11/07/2018 11:36 AM     Lab Results   Component Value Date/Time    CK 56 05/14/2018 10:41 AM     Lab Results   Component Value Date/Time    Cholesterol, total 158 11/08/2018 05:30 AM    HDL Cholesterol 63 11/08/2018 05:30 AM    LDL, calculated 73.2 11/08/2018 05:30 AM    Triglyceride 109 11/08/2018 05:30 AM    CHOL/HDL Ratio 2.5 11/08/2018 05:30 AM     No results found for this or any previous visit. Assessment:         Patient Active Problem List    Diagnosis Date Noted    TIA (transient ischemic attack) 11/07/2018    Other chest pain 06/12/2018    Hypertension     Dyslipidemia         Plan:     Doing well with no adverse cardiac symptoms. Lipids and labs followed by PCP. Continue current care and f/u in 12 months.     Fabiana Newberry MD

## 2020-02-24 ENCOUNTER — TELEPHONE (OUTPATIENT)
Dept: CARDIOLOGY CLINIC | Age: 70
End: 2020-02-24

## 2020-02-24 NOTE — TELEPHONE ENCOUNTER
Spoke with the patient. Verified patient with two patient identifiers. Pt states that her RX insurance will not cover amlodipine 2.5 mg bid. Advised that I will discuss with Dr. Sarita Keller. Patient verbalized understanding.

## 2020-02-25 RX ORDER — AMLODIPINE BESYLATE 5 MG/1
5 TABLET ORAL DAILY
Qty: 90 TAB | Refills: 0 | Status: SHIPPED | OUTPATIENT
Start: 2020-02-25 | End: 2020-04-27

## 2020-02-25 NOTE — TELEPHONE ENCOUNTER
Per Dr. Pedroza Rathdrum:    MD Mallika Cardoso, LPN   Caller: Unspecified (Yesterday,  1:01 PM)             Rx as 5, can cut in 1/2      amLODIPine (NORVASC) 5 mg tablet [[id number]]     Order Details   Dose: 5 mg Route: Oral Frequency: DAILY   Note to Pharmacy:   Dose and frequency change as of 2/25/20. Dispense Quantity: 90 Tab Refills: 0 Fills remaining: --           Sig: Take 1 Tab by mouth daily. Spoke with the patient. Verified patient with two patient identifiers. New order given and questions answered. Patient verbalized understanding.

## 2020-09-28 RX ORDER — AMLODIPINE BESYLATE 5 MG/1
TABLET ORAL
Qty: 90 TAB | Refills: 1 | Status: SHIPPED | OUTPATIENT
Start: 2020-09-28 | End: 2021-01-26 | Stop reason: DRUGHIGH

## 2020-11-06 ENCOUNTER — TRANSCRIBE ORDER (OUTPATIENT)
Dept: SCHEDULING | Age: 70
End: 2020-11-06

## 2020-11-06 DIAGNOSIS — K57.20 DIVERTICULITIS OF LARGE INTESTINE WITH PERFORATION WITHOUT ABSCESS OR BLEEDING: Primary | ICD-10-CM

## 2020-11-10 ENCOUNTER — TRANSCRIBE ORDER (OUTPATIENT)
Dept: SCHEDULING | Age: 70
End: 2020-11-10

## 2020-11-10 DIAGNOSIS — Z87.891 PERSONAL HISTORY OF NICOTINE DEPENDENCE: ICD-10-CM

## 2020-11-10 DIAGNOSIS — Z78.0 ASYMPTOMATIC MENOPAUSAL STATE: Primary | ICD-10-CM

## 2020-11-10 DIAGNOSIS — Z12.31 ENCOUNTER FOR SCREENING MAMMOGRAM FOR MALIGNANT NEOPLASM OF BREAST: ICD-10-CM

## 2021-01-26 ENCOUNTER — OFFICE VISIT (OUTPATIENT)
Dept: CARDIOLOGY CLINIC | Age: 71
End: 2021-01-26
Payer: MEDICARE

## 2021-01-26 VITALS
HEART RATE: 56 BPM | SYSTOLIC BLOOD PRESSURE: 130 MMHG | RESPIRATION RATE: 16 BRPM | HEIGHT: 62 IN | WEIGHT: 190 LBS | OXYGEN SATURATION: 96 % | DIASTOLIC BLOOD PRESSURE: 70 MMHG | BODY MASS INDEX: 34.96 KG/M2 | TEMPERATURE: 97.2 F

## 2021-01-26 DIAGNOSIS — I10 ESSENTIAL HYPERTENSION: Primary | ICD-10-CM

## 2021-01-26 DIAGNOSIS — G45.9 TIA (TRANSIENT ISCHEMIC ATTACK): ICD-10-CM

## 2021-01-26 DIAGNOSIS — R00.2 PALPITATIONS: ICD-10-CM

## 2021-01-26 DIAGNOSIS — J45.20 MILD INTERMITTENT ASTHMA WITHOUT COMPLICATION: ICD-10-CM

## 2021-01-26 DIAGNOSIS — E78.5 DYSLIPIDEMIA: ICD-10-CM

## 2021-01-26 PROCEDURE — G8427 DOCREV CUR MEDS BY ELIG CLIN: HCPCS | Performed by: INTERNAL MEDICINE

## 2021-01-26 PROCEDURE — 93000 ELECTROCARDIOGRAM COMPLETE: CPT | Performed by: INTERNAL MEDICINE

## 2021-01-26 PROCEDURE — G8754 DIAS BP LESS 90: HCPCS | Performed by: INTERNAL MEDICINE

## 2021-01-26 PROCEDURE — 1090F PRES/ABSN URINE INCON ASSESS: CPT | Performed by: INTERNAL MEDICINE

## 2021-01-26 PROCEDURE — 1101F PT FALLS ASSESS-DOCD LE1/YR: CPT | Performed by: INTERNAL MEDICINE

## 2021-01-26 PROCEDURE — G8417 CALC BMI ABV UP PARAM F/U: HCPCS | Performed by: INTERNAL MEDICINE

## 2021-01-26 PROCEDURE — G9899 SCRN MAM PERF RSLTS DOC: HCPCS | Performed by: INTERNAL MEDICINE

## 2021-01-26 PROCEDURE — 99214 OFFICE O/P EST MOD 30 MIN: CPT | Performed by: INTERNAL MEDICINE

## 2021-01-26 PROCEDURE — G8752 SYS BP LESS 140: HCPCS | Performed by: INTERNAL MEDICINE

## 2021-01-26 PROCEDURE — G8399 PT W/DXA RESULTS DOCUMENT: HCPCS | Performed by: INTERNAL MEDICINE

## 2021-01-26 PROCEDURE — G8510 SCR DEP NEG, NO PLAN REQD: HCPCS | Performed by: INTERNAL MEDICINE

## 2021-01-26 PROCEDURE — 3017F COLORECTAL CA SCREEN DOC REV: CPT | Performed by: INTERNAL MEDICINE

## 2021-01-26 PROCEDURE — G8536 NO DOC ELDER MAL SCRN: HCPCS | Performed by: INTERNAL MEDICINE

## 2021-01-26 RX ORDER — METOPROLOL TARTRATE 25 MG/1
12.5 TABLET, FILM COATED ORAL 2 TIMES DAILY
Qty: 90 TAB | Refills: 1 | Status: SHIPPED | OUTPATIENT
Start: 2021-01-26 | End: 2021-03-05 | Stop reason: SDUPTHER

## 2021-01-26 NOTE — LETTER
1/26/2021 Patient: Kat Noonan YOB: 1950 Date of Visit: 1/26/2021 Ron Carrillo MD 
[address] [facility] [id number] Via Fax: [phone] Dear Ron Carrillo MD, Thank you for referring Ms. Rolando Del Rosario to [facility] for evaluation. My notes for this consultation are attached. If you have questions, please do not hesitate to call me. I look forward to following your patient along with you.  
 
 
Sincerely, 
 
Lucy Hassan MD

## 2021-01-26 NOTE — PROGRESS NOTES
Verified patient with two patient identifiers. Medications reviewed/approved by Dr. Zee Kaufman. Chief Complaint   Patient presents with    Hypertension     Annual follow up     1. Have you been to the ER, urgent care clinic since your last visit? Hospitalized since your last visit?no    2. Have you seen or consulted any other health care providers outside of the [address] [place] [address] since your last visit? Include any pap smears or colon screening. Yes, Dr. Jacquelin Bradley - seen since the l/v for r/c.r. Dr Xiomara coleman - l/s yesterday.   Dr. Germaine Quigley neuro seen since l/v.

## 2021-01-26 NOTE — PROGRESS NOTES
Sung Roca is a 80 y.o. female is here for routine f/u. No specific CV sx or complaints currently. Hx hypertension, DJD, asthma with cervical pain, intermittent non-radiating CP--non-exertional  Prior Stress MPI 2011 abnormal with subsequent cardiac cath 2011 neg for CAD.  Seen by Dr Xochitl Rodriguez 5/29/18--labs sent (CBC, CMP, lipids--chol 226, , HDL 64, ), thyroids, CPK, trop--normal); Some intolerance to statins in past (lovastatin, crestor) with myalgias, currently back on Crestor 5 mg--ok.  Echo 5/23/18 with LVEF 10-98, grade I diastolic, valves ok. Carotid dopplers 5/23/18 with mild bilateral ICA plaque (16-49%), antegrade vertebrals. Stress MPI 6/4/18 Jose Maria treadmill 3:51, , chest pain prior and throughout test with no changes, no ischemic ST changes,  normal perfusion/no ischemia, LVEF 70. +FH CAD, CVA, hypertension, dyslipidemia. Continues to see PCP--OV last month with labs, etc.  Chol 170/DLD 88/HDL 56/ 0n 6/10/19, free T$ 1.8(high), TSH 0.5, CMP/CBC normal.  Former smoker quit 2013. Martinawilian Jim monitor 1/19--NSR, rare ectopy, no afib. The patient denies chest pain/ shortness of breath, orthopnea, PND, LE edema, syncope, presyncope or fatigue.        Patient Active Problem List    Diagnosis Date Noted    TIA (transient ischemic attack) 11/07/2018    Other chest pain 06/12/2018    Hypertension     Dyslipidemia       Antoinette Ortiz MD  Past Medical History:   Diagnosis Date    Arthritis     Asthma     Chronic pain     Dyslipidemia     Hypertension     Lumbar disc disease     Lumbar spondylosis     Menopause     Migraine headache     Thyroid disease       Past Surgical History:   Procedure Laterality Date    HX APPENDECTOMY  1977    HX CERVICAL LAMINECTOMY  1989, 2005    HX GYN      HX HYSTERECTOMY      HX OOPHORECTOMY      HX ORTHOPAEDIC  2013, 2014    back surgery; wrist surgery      Allergies   Allergen Reactions    Mold Other (comments)     Asthma symptoms    Pcn [Penicillins] Anaphylaxis    Ibuprofen Rash      Family History   Problem Relation Age of Onset    Stroke Mother     Hypertension Mother     Dementia Mother     High Cholesterol Mother     Heart Disease Father     Hypertension Father     Kidney Disease Father     High Cholesterol Father     Hypertension Sister     Stroke Sister         Sjogren's      Social History     Socioeconomic History    Marital status:      Spouse name: Not on file    Number of children: Not on file    Years of education: Not on file    Highest education level: Not on file   Occupational History    Not on file   Social Needs    Financial resource strain: Not on file    Food insecurity     Worry: Not on file     Inability: Not on file    Transportation needs     Medical: Not on file     Non-medical: Not on file   Tobacco Use    Smoking status: Former Smoker     Packs/day: 2.00     Years: 45.00     Pack years: 90.00     Quit date: 2013     Years since quittin.1    Smokeless tobacco: Never Used   Substance and Sexual Activity    Alcohol use:  Yes     Alcohol/week: 5.0 standard drinks     Types: 5 Glasses of wine per week    Drug use: Not on file    Sexual activity: Not on file   Lifestyle    Physical activity     Days per week: Not on file     Minutes per session: Not on file    Stress: Not on file   Relationships    Social connections     Talks on phone: Not on file     Gets together: Not on file     Attends Druze service: Not on file     Active member of club or organization: Not on file     Attends meetings of clubs or organizations: Not on file     Relationship status: Not on file    Intimate partner violence     Fear of current or ex partner: Not on file     Emotionally abused: Not on file     Physically abused: Not on file     Forced sexual activity: Not on file   Other Topics Concern    Not on file   Social History Narrative    Not on file      Current Outpatient Medications   Medication Sig    metoprolol tartrate (LOPRESSOR) 25 mg tablet TAKE 1 TABLET TWICE DAILY    amLODIPine (NORVASC) 2.5 mg tablet TAKE 1 TABLET TWICE DAILY    aspirin delayed-release 81 mg tablet Take 1 Tab by mouth daily. (Patient taking differently: Take 81 mg by mouth every other day.)    clopidogrel (PLAVIX) 75 mg tab Take 1 Tab by mouth daily. (Patient taking differently: Take 75 mg by mouth every three (3) days.)    levothyroxine (SYNTHROID) 75 mcg tablet Take 50 mcg by mouth Daily (before breakfast).  ubidecarenone (CO Q-10 PO) Take 200 mg by mouth daily.  rosuvastatin (CRESTOR) 10 mg tablet Take 10 mg by mouth nightly.  diphenhydrAMINE (BENADRYL) 25 mg capsule Take 25 mg by mouth every six (6) hours as needed.  pilocarpine (SALAGEN) 5 mg tablet Take 5 mg by mouth three (3) times daily.  montelukast (SINGULAIR) 10 mg tablet Take 10 mg by mouth daily.  albuterol (PROVENTIL VENTOLIN) 2.5 mg /3 mL (0.083 %) nebulizer solution 2.5 mg by Nebulization route every four (4) hours as needed for Wheezing.  cyclobenzaprine (FLEXERIL) 10 mg tablet Take 10 mg by mouth three (3) times daily as needed for Muscle Spasm(s).  zolpidem (AMBIEN) 10 mg tablet Take 5 mg by mouth nightly as needed for Sleep. Indications: Takes 5mg or 10mg as needed    multivitamin (ONE A DAY) tablet Take 1 Tab by mouth daily. Pure Multivitamin     No current facility-administered medications for this visit. Review of Symptoms:    CONST  No weight change. No fever, chills, sweats    ENT No visual changes, URI sx, sore throat    CV  See HPI   RESP  No cough, or sputum, wheezing. Also see HPI   GI  No abdominal pain or change in bowel habits. No heartburn or dysphagia. No melena or rectal bleeding.   No dysuria, urgency, frequency, hematuria   MSKEL  No joint pain, swelling. No muscle pain. SKIN  No rash or lesions. NEURO  No headache, syncope, or seizure.    No weakness, loss of sensation, or paresthesias. PSYCH  No low mood or depression  No anxiety. HE/LYMPH  No easy bruising, abnormal bleeding, or enlarged glands. Physical ExamPhysical Exam:    Visit Vitals  /70 (BP 1 Location: Left arm, BP Patient Position: Sitting)   Pulse (!) 56   Temp 97.2 °F (36.2 °C) (Temporal)   Resp 16   Ht 5' 2\" (1.575 m)   Wt 190 lb (86.2 kg)   SpO2 96% Comment: ra   BMI 34.75 kg/m²     Gen: NAD  HEENT:  PERRL, throat clear  Neck: no adenopathy, no thyromegaly, no JVD   Heart:  Regular,Nl S1S2,  no murmur, gallop or rub. Lungs:  clear  Abdomen:   Soft, non-tender, bowel sounds are active.    Extremities:  No edema  Pulse: symmetric  Neuro: A&O times 3, No focal neuro deficits    Cardiographics    ECG: normal EKG, normal sinus rhythm, unchanged from previous tracings    Labs:   Lab Results   Component Value Date/Time    Sodium 139 11/07/2018 11:36 AM    Sodium 138 04/07/2017 02:15 PM    Potassium 3.4 (L) 11/07/2018 11:36 AM    Potassium 4.0 04/07/2017 02:15 PM    Chloride 102 11/07/2018 11:36 AM    Chloride 101 04/07/2017 02:15 PM    CO2 25 11/07/2018 11:36 AM    CO2 27 04/07/2017 02:15 PM    Anion gap 12 11/07/2018 11:36 AM    Anion gap 10 04/07/2017 02:15 PM    Glucose 107 (H) 11/07/2018 11:36 AM    Glucose 123 (H) 04/07/2017 02:15 PM    BUN 20 11/07/2018 11:36 AM    BUN 19 (H) 07/20/2017 08:30 AM    BUN 25 (H) 04/07/2017 02:15 PM    Creatinine 1.06 (H) 11/07/2018 11:36 AM    Creatinine 1.05 (H) 10/16/2017 12:10 PM    Creatinine 1.13 (H) 07/20/2017 08:30 AM    Creatinine 1.23 (H) 04/07/2017 02:15 PM    BUN/Creatinine ratio 19 11/07/2018 11:36 AM    BUN/Creatinine ratio 20 04/07/2017 02:15 PM    GFR est AA >60 11/07/2018 11:36 AM    GFR est AA >60 10/16/2017 12:10 PM    GFR est AA 58 (L) 07/20/2017 08:30 AM    GFR est AA 53 (L) 04/07/2017 02:15 PM    GFR est non-AA 52 (L) 11/07/2018 11:36 AM    GFR est non-AA 52 (L) 10/16/2017 12:10 PM    GFR est non-AA 48 (L) 07/20/2017 08:30 AM    GFR est non-AA 44 (L) 04/07/2017 02:15 PM    Calcium 9.2 11/07/2018 11:36 AM    Calcium 8.8 10/16/2017 12:10 PM    Calcium 9.4 04/07/2017 02:15 PM    Bilirubin, total 0.3 11/07/2018 11:36 AM    Alk. phosphatase 86 11/07/2018 11:36 AM    Protein, total 8.0 11/07/2018 11:36 AM    Albumin 4.4 11/07/2018 11:36 AM    Globulin 3.6 11/07/2018 11:36 AM    A-G Ratio 1.2 11/07/2018 11:36 AM    ALT (SGPT) 31 11/07/2018 11:36 AM     Lab Results   Component Value Date/Time    CK 56 05/14/2018 10:41 AM     Lab Results   Component Value Date/Time    Cholesterol, total 158 11/08/2018 05:30 AM    HDL Cholesterol 63 11/08/2018 05:30 AM    LDL, calculated 73.2 11/08/2018 05:30 AM    Triglyceride 109 11/08/2018 05:30 AM    CHOL/HDL Ratio 2.5 11/08/2018 05:30 AM     No results found for this or any previous visit. Assessment:         Patient Active Problem List    Diagnosis Date Noted    TIA (transient ischemic attack) 11/07/2018    Other chest pain 06/12/2018    Hypertension     Dyslipidemia      No specific CV sx or complaints currently. Hx hypertension, DJD, asthma with cervical pain, intermittent non-radiating CP--non-exertional  Prior Stress MPI 2011 abnormal with subsequent cardiac cath 2011 neg for CAD.  Seen by Dr Ruddy Strickland 5/29/18--labs sent (CBC, CMP, lipids--chol 226, , HDL 64, ), thyroids, CPK, trop--normal); Some intolerance to statins in past (lovastatin, crestor) with myalgias, currently back on Crestor 5mg--ok.  Echo 5/23/18 with LVEF 36-92, grade I diastolic, valves ok. Carotid dopplers 5/23/18 with mild bilateral ICA plaque (16-49%), antegrade vertebrals. Stress MPI 6/4/18 Jose Maria treadmill 3:51, , chest pain prior and throughout test with no changes, no ischemic ST changes,  normal perfusion/no ischemia, LVEF 70.  +FH CAD, CVA, hypertension, dyslipidemia. Continues to see PCP--OV last month with labs, etc.  Chol 170/DLD 88/HDL 56/ 0n 6/10/19, free T$ 1.8(high), TSH 0.5, CMP/CBC normal.  Former smoker quit 2013. Maranda Avelar monitor 1/19--NSR, rare ectopy, no afib. Plan:     Some weight gain and feels draggy, bradycardia--will Reduce the Metoprolol to 12.5mg bid  Continue other medsrx  Home BP checks  F/u with PCP as planned    Lipids and labs followed by PCP. Continue current care and f/u in 12 months.     Andrei Tilley MD

## 2021-03-04 RX ORDER — AMLODIPINE BESYLATE 2.5 MG/1
TABLET ORAL
Qty: 180 TAB | Refills: 1 | Status: SHIPPED | OUTPATIENT
Start: 2021-03-04 | End: 2021-08-17

## 2021-03-05 RX ORDER — METOPROLOL TARTRATE 25 MG/1
12.5 TABLET, FILM COATED ORAL 2 TIMES DAILY
Qty: 90 TAB | Refills: 1 | Status: SHIPPED | OUTPATIENT
Start: 2021-03-05 | End: 2022-06-29 | Stop reason: SDUPTHER

## 2021-08-17 RX ORDER — AMLODIPINE BESYLATE 2.5 MG/1
TABLET ORAL
Qty: 180 TABLET | Refills: 1 | Status: SHIPPED | OUTPATIENT
Start: 2021-08-17 | End: 2022-02-14

## 2021-08-18 ENCOUNTER — TRANSCRIBE ORDER (OUTPATIENT)
Dept: REGISTRATION | Age: 71
End: 2021-08-18

## 2021-08-18 ENCOUNTER — HOSPITAL ENCOUNTER (OUTPATIENT)
Dept: GENERAL RADIOLOGY | Age: 71
Discharge: HOME | End: 2021-08-18
Payer: MEDICARE

## 2021-08-18 DIAGNOSIS — R10.2 PERINEAL NEURALGIA: Primary | ICD-10-CM

## 2021-08-18 DIAGNOSIS — R10.2 PERINEAL NEURALGIA: ICD-10-CM

## 2021-08-18 PROCEDURE — 74018 RADEX ABDOMEN 1 VIEW: CPT

## 2021-08-20 ENCOUNTER — TRANSCRIBE ORDER (OUTPATIENT)
Dept: SCHEDULING | Age: 71
End: 2021-08-20

## 2022-01-18 LAB
CREATININE, EXTERNAL: 1.2
LDL-C, EXTERNAL: 136.8

## 2022-01-25 ENCOUNTER — HOSPITAL ENCOUNTER (OUTPATIENT)
Dept: GENERAL RADIOLOGY | Age: 72
Discharge: HOME | End: 2022-01-25
Payer: MEDICARE

## 2022-01-25 ENCOUNTER — TRANSCRIBE ORDER (OUTPATIENT)
Dept: REGISTRATION | Age: 72
End: 2022-01-25

## 2022-01-25 DIAGNOSIS — M25.562 LEFT KNEE PAIN: ICD-10-CM

## 2022-01-25 DIAGNOSIS — M25.512 LEFT SHOULDER PAIN: ICD-10-CM

## 2022-01-25 DIAGNOSIS — M25.562 LEFT KNEE PAIN: Primary | ICD-10-CM

## 2022-01-25 PROCEDURE — 73030 X-RAY EXAM OF SHOULDER: CPT

## 2022-01-25 PROCEDURE — 73564 X-RAY EXAM KNEE 4 OR MORE: CPT

## 2022-02-14 RX ORDER — AMLODIPINE BESYLATE 2.5 MG/1
TABLET ORAL
Qty: 180 TABLET | Refills: 1 | Status: SHIPPED | OUTPATIENT
Start: 2022-02-14 | End: 2022-08-08

## 2022-02-17 NOTE — PROGRESS NOTES
[address], [address] [place] [address] Ne, [address] [facility]  [phone]     Subjective:      Lesa Hills is a 71 y.o. female is here for routine f/u. Pmhx hypertension, DJD, asthma with cervical pain, intermittent non-radiating CP--non-exertional.  Prior Stress MPI 2011 abnormal with subsequent cardiac cath 2011 neg for CAD. Last seen by Dr Ivana Tovar 1/26/2021:  No specific CV sx or complaints currently. At that time we cut back on BB    Today, she denies chest pain/ shortness of breath, orthopnea, PND, LE edema, palpitations, syncope, or presyncope. She has started taking Phentermine for weight loss which has helped her lose 13lbs so far.      Patient Active Problem List    Diagnosis Date Noted    TIA (transient ischemic attack) 11/07/2018    Other chest pain 06/12/2018    Hypertension     Dyslipidemia       Antoinette Ortiz MD  Past Medical History:   Diagnosis Date    Arthritis     Asthma     Chronic pain     Dyslipidemia     Hypertension     Lumbar disc disease     Lumbar spondylosis     Menopause     Migraine headache     Thyroid disease       Past Surgical History:   Procedure Laterality Date    HX APPENDECTOMY  47    HX CERVICAL LAMINECTOMY  1989, 2005    HX GYN      HX HYSTERECTOMY      HX OOPHORECTOMY      HX ORTHOPAEDIC  2013, 2014    back surgery; wrist surgery      Allergies   Allergen Reactions    Mold Other (comments)     Asthma symptoms    Pcn [Penicillins] Anaphylaxis    Ibuprofen Rash      Family History   Problem Relation Age of Onset    Stroke Mother     Hypertension Mother     Dementia Mother     High Cholesterol Mother     Heart Disease Father     Hypertension Father     Kidney Disease Father     High Cholesterol Father     Hypertension Sister     Stroke Sister         Sjogren's      Social History     Socioeconomic History    Marital status:      Spouse name: Not on file    Number of children: Not on file    Years of education: Not on file    Highest education level: Not on file   Occupational History    Not on file   Tobacco Use    Smoking status: Former Smoker     Packs/day: 2.00     Years: 45.00     Pack years: 90.00     Quit date: 2013     Years since quittin.2    Smokeless tobacco: Never Used   Substance and Sexual Activity    Alcohol use: Yes     Alcohol/week: 5.0 standard drinks     Types: 5 Glasses of wine per week    Drug use: Not on file    Sexual activity: Not on file   Other Topics Concern    Not on file   Social History Narrative    Not on file     Social Determinants of Health     Financial Resource Strain:     Difficulty of Paying Living Expenses: Not on file   Food Insecurity:     Worried About Running Out of Food in the Last Year: Not on file    Eris of Food in the Last Year: Not on file   Transportation Needs:     Lack of Transportation (Medical): Not on file    Lack of Transportation (Non-Medical):  Not on file   Physical Activity:     Days of Exercise per Week: Not on file    Minutes of Exercise per Session: Not on file   Stress:     Feeling of Stress : Not on file   Social Connections:     Frequency of Communication with Friends and Family: Not on file    Frequency of Social Gatherings with Friends and Family: Not on file    Attends Judaism Services: Not on file    Active Member of [address] [place] [address] [employer] or Organizations: Not on file    Attends Club or Organization Meetings: Not on file    Marital Status: Not on file   Intimate Partner Violence:     Fear of Current or Ex-Partner: Not on file    Emotionally Abused: Not on file    Physically Abused: Not on file    Sexually Abused: Not on file   Housing Stability:     Unable to Pay for Housing in the Last Year: Not on file    Number of [place] in the Last Year: Not on file    Unstable Housing in the Last Year: Not on file      Current Outpatient Medications   Medication Sig    amLODIPine (NORVASC) 2.5 mg tablet TAKE 1 TABLET TWICE A DAY    metoprolol tartrate (LOPRESSOR) 25 mg tablet Take 0.5 Tabs by mouth two (2) times a day. Reduced dose    aspirin delayed-release 81 mg tablet Take 1 Tab by mouth daily. (Patient taking differently: Take 81 mg by mouth every other day.)    clopidogrel (PLAVIX) 75 mg tab Take 1 Tab by mouth daily. (Patient taking differently: Take 75 mg by mouth every three (3) days.)    levothyroxine (SYNTHROID) 75 mcg tablet Take 50 mcg by mouth Daily (before breakfast).  ubidecarenone (CO Q-10 PO) Take 200 mg by mouth daily.  rosuvastatin (CRESTOR) 10 mg tablet Take 10 mg by mouth nightly.  diphenhydrAMINE (BENADRYL) 25 mg capsule Take 25 mg by mouth every six (6) hours as needed.  pilocarpine (SALAGEN) 5 mg tablet Take 5 mg by mouth three (3) times daily.  montelukast (SINGULAIR) 10 mg tablet Take 10 mg by mouth daily.  albuterol (PROVENTIL VENTOLIN) 2.5 mg /3 mL (0.083 %) nebulizer solution 2.5 mg by Nebulization route every four (4) hours as needed for Wheezing.  cyclobenzaprine (FLEXERIL) 10 mg tablet Take 10 mg by mouth three (3) times daily as needed for Muscle Spasm(s).  zolpidem (AMBIEN) 10 mg tablet Take 5 mg by mouth nightly as needed for Sleep. Indications: Takes 5mg or 10mg as needed    multivitamin (ONE A DAY) tablet Take 1 Tab by mouth daily. Pure Multivitamin     No current facility-administered medications for this visit. Review of Symptoms:  11 systems reviewed, negative other than as stated in the HPI    Physical Exam   General PE  Gen:  NAD  Mental Status - Alert. General Appearance - Not in acute distress. HEENT:  PER, EOM, no JVD  Chest and Lung Exam   Inspection: Accessory muscles - No use of accessory muscles in breathing. No audible wheezing. Normal effort in breathing. Symmetric excursion. Cardiovascular:  No JVD  Upper Extremity: Inspection - Bilateral - No Cyanotic nailbeds or Digital clubbing. Lower Extremity:   Palpation: Edema - Bilateral - No edema.   Neuro: A&O times 3, CN and motor grossly WNL  Skin: no rashes or lesions on exposed skin, dry, intact  Psych: normal mood, affect. Speech clear. VS obtained in office 98.6 HR 80 /70 O2 sat 98% weight 180lbs. Labs:   Lab Results   Component Value Date/Time    Cholesterol, total 158 11/08/2018 05:30 AM    HDL Cholesterol 63 11/08/2018 05:30 AM    LDL, calculated 73.2 11/08/2018 05:30 AM    Triglyceride 109 11/08/2018 05:30 AM    CHOL/HDL Ratio 2.5 11/08/2018 05:30 AM     Lab Results   Component Value Date/Time    CK 56 05/14/2018 10:41 AM     Lab Results   Component Value Date/Time    Sodium 139 11/07/2018 11:36 AM    Potassium 3.4 (L) 11/07/2018 11:36 AM    Chloride 102 11/07/2018 11:36 AM    CO2 25 11/07/2018 11:36 AM    Anion gap 12 11/07/2018 11:36 AM    Glucose 107 (H) 11/07/2018 11:36 AM    BUN 20 11/07/2018 11:36 AM    Creatinine 1.06 (H) 11/07/2018 11:36 AM    BUN/Creatinine ratio 19 11/07/2018 11:36 AM    GFR est AA >60 11/07/2018 11:36 AM    GFR est non-AA 52 (L) 11/07/2018 11:36 AM    Calcium 9.2 11/07/2018 11:36 AM    Bilirubin, total 0.3 11/07/2018 11:36 AM    Alk. phosphatase 86 11/07/2018 11:36 AM    Protein, total 8.0 11/07/2018 11:36 AM    Albumin 4.4 11/07/2018 11:36 AM    Globulin 3.6 11/07/2018 11:36 AM    A-G Ratio 1.2 11/07/2018 11:36 AM    ALT (SGPT) 31 11/07/2018 11:36 AM       EKG:  SR HR 71    Echo 5/23/18 with LVEF 46-17, grade I diastolic, valves ok. Carotid dopplers     5/23/18 with mild bilateral ICA plaque (16-49%), antegrade vertebrals.     Stress MPI 6/4/18 Jose Maria treadmill 3:51, , chest pain prior and throughout test with no changes, no ischemic ST changes,  normal perfusion/no ischemia, LVEF 70.      Holter monitor 1/19--NSR, rare ectopy, no afib       Assessment:            ICD-10-CM ICD-9-CM    1. Essential hypertension  I10 401.9    2. Dyslipidemia  E78.5 272.4    3. Palpitations  R00.2 785.1    4. TIA (transient ischemic attack)  G45.9 435.9    5.  Mild intermittent asthma without complication  M70.24 120.82        No orders of the defined types were placed in this encounter. Plan:     Hx atypical cp  Stress MPI 6/4/18 Jose Maria treadmill 3:51, , chest pain prior and throughout test with no changes, no ischemic ST changes,  normal perfusion/no ischemia, LVEF 70. Prior Stress MPI 2011 abnormal with subsequent cardiac cath 2011 neg for CAD. Normal EF no significant valvular disease per echo in 5/2018  Denies angina or anginal equivalent symptoms today. ECG SR. Cont on BB, ASA. HTN  Controlled with amlodipine, BB. 110/70. Creatinine 1.2 9/20/21. HLD  No longer on Crestor d/t myalgias. Attempted to take Repatha and was cost prohibitive. Labs and lipids per PCP    Hypothyroidism  On levothyroxine    Hx TIA  On plavix, ASA    Continue current care and f/u in 6 months. Johnny Ruiz, MAYO Salguero, was evaluated through a hybrid synchronous (real-time) audio-video encounter. The patient (or guardian if applicable) is aware that this is a billable service. Verbal consent to proceed has been obtained within the past 12 months. The visit was conducted pursuant to the emergency declaration under the [facility], [address] [place] [address] authority and the [employer] General Act. Patient identification was verified, and a caregiver was present when appropriate. The patient was located in our [place] [address] office where a nurse obtained VS and EKG and I was present in our [address] location where I am credentialed to provide care. On this date 2/2/22  I have spent 31 minutes reviewing previous notes, test results and face to face with the patient discussing the diagnosis and importance of compliance with the treatment plan as well as documenting on the day of the visit.

## 2022-02-22 ENCOUNTER — VIRTUAL VISIT (OUTPATIENT)
Dept: CARDIOLOGY CLINIC | Age: 72
End: 2022-02-22
Payer: MEDICARE

## 2022-02-22 DIAGNOSIS — G45.9 TIA (TRANSIENT ISCHEMIC ATTACK): ICD-10-CM

## 2022-02-22 DIAGNOSIS — R00.2 PALPITATIONS: ICD-10-CM

## 2022-02-22 DIAGNOSIS — I10 ESSENTIAL HYPERTENSION: Primary | ICD-10-CM

## 2022-02-22 DIAGNOSIS — E78.5 DYSLIPIDEMIA: ICD-10-CM

## 2022-02-22 DIAGNOSIS — J45.20 MILD INTERMITTENT ASTHMA WITHOUT COMPLICATION: ICD-10-CM

## 2022-02-22 PROCEDURE — G8432 DEP SCR NOT DOC, RNG: HCPCS | Performed by: NURSE PRACTITIONER

## 2022-02-22 PROCEDURE — G8399 PT W/DXA RESULTS DOCUMENT: HCPCS | Performed by: NURSE PRACTITIONER

## 2022-02-22 PROCEDURE — 1101F PT FALLS ASSESS-DOCD LE1/YR: CPT | Performed by: NURSE PRACTITIONER

## 2022-02-22 PROCEDURE — 93000 ELECTROCARDIOGRAM COMPLETE: CPT | Performed by: INTERNAL MEDICINE

## 2022-02-22 PROCEDURE — 99214 OFFICE O/P EST MOD 30 MIN: CPT | Performed by: NURSE PRACTITIONER

## 2022-02-22 PROCEDURE — G8421 BMI NOT CALCULATED: HCPCS | Performed by: NURSE PRACTITIONER

## 2022-02-22 PROCEDURE — 3017F COLORECTAL CA SCREEN DOC REV: CPT | Performed by: NURSE PRACTITIONER

## 2022-02-22 PROCEDURE — G8536 NO DOC ELDER MAL SCRN: HCPCS | Performed by: NURSE PRACTITIONER

## 2022-02-22 PROCEDURE — G8756 NO BP MEASURE DOC: HCPCS | Performed by: NURSE PRACTITIONER

## 2022-02-22 PROCEDURE — G9899 SCRN MAM PERF RSLTS DOC: HCPCS | Performed by: NURSE PRACTITIONER

## 2022-02-22 PROCEDURE — 1090F PRES/ABSN URINE INCON ASSESS: CPT | Performed by: NURSE PRACTITIONER

## 2022-02-22 PROCEDURE — G8427 DOCREV CUR MEDS BY ELIG CLIN: HCPCS | Performed by: NURSE PRACTITIONER

## 2022-02-22 RX ORDER — PHENTERMINE HYDROCHLORIDE 37.5 MG/1
37.5 CAPSULE ORAL
COMMUNITY
Start: 2022-01-21 | End: 2022-08-25

## 2022-02-22 RX ORDER — CONJUGATED ESTROGENS 0.62 MG/G
CREAM VAGINAL
COMMUNITY

## 2022-02-22 RX ORDER — POTASSIUM CITRATE 10 MEQ/1
2 TABLET, EXTENDED RELEASE ORAL 2 TIMES DAILY
COMMUNITY

## 2022-02-22 NOTE — PROGRESS NOTES
Identified pt with two pt identifiers(name and ). Reviewed record in preparation for visit and have obtained necessary documentation. Chief Complaint   Patient presents with    Coronary Artery Disease     Follow up    Hypertension    Cholesterol Problem     All vitals were manually obtained. 22    9830   0973     Patient-Reported Vitals - use when patient reports their vitals during a virtual visit by telephone or video. Patient-Reported Systolic (Top)  257   Patient-Reported Diastolic (Bottom)  70   BP Observation     Patient-Reported Pulse  80   Patient-Reported Temperature  98.6F   Patient-Reported Weight  180lb   Patient-Reported Pulse Oximetry  98%Patient-Reported Pulse Oximetry. 98%. The comment is ra. Taken on 22 [id number]           Medications reviewed/approved by provider. Health Maintenance Review: Patient reminded of \"due or due soon\" health maintenance. I have asked the patient to contact his/her primary care provider (PCP) for follow-up on his/her health maintenance. Coordination of Care Questionnaire:  :   1) Have you been to an emergency room, urgent care, or hospitalized since your last visit? If yes, where when, and reason for visit? no       2. Have seen or consulted any other health care provider since your last visit? If yes, where when, and reason for visit? YES - Dr. Zeenat Bynum for routine care. Patient is accompanied by self I have received verbal consent from Paxton Saavedra to discuss any/all medical information while they are present in the room.

## 2022-03-19 PROBLEM — G45.9 TIA (TRANSIENT ISCHEMIC ATTACK): Status: ACTIVE | Noted: 2018-11-07

## 2022-03-19 PROBLEM — R07.89 OTHER CHEST PAIN: Status: ACTIVE | Noted: 2018-06-12

## 2022-03-28 ENCOUNTER — HOSPITAL ENCOUNTER (OUTPATIENT)
Dept: MAMMOGRAPHY | Age: 72
Discharge: HOME | End: 2022-03-28
Attending: NURSE PRACTITIONER
Payer: MEDICARE

## 2022-03-28 VITALS — WEIGHT: 185 LBS | HEIGHT: 62 IN | BODY MASS INDEX: 34.04 KG/M2

## 2022-03-28 DIAGNOSIS — Z12.31 ENCOUNTER FOR SCREENING MAMMOGRAM FOR MALIGNANT NEOPLASM OF BREAST: ICD-10-CM

## 2022-03-28 PROCEDURE — 77063 BREAST TOMOSYNTHESIS BI: CPT

## 2022-04-06 ENCOUNTER — OFFICE VISIT (OUTPATIENT)
Dept: CARDIOLOGY CLINIC | Age: 72
End: 2022-04-06
Payer: MEDICARE

## 2022-04-06 VITALS
RESPIRATION RATE: 16 BRPM | HEART RATE: 80 BPM | SYSTOLIC BLOOD PRESSURE: 120 MMHG | OXYGEN SATURATION: 96 % | BODY MASS INDEX: 33.21 KG/M2 | WEIGHT: 180.5 LBS | HEIGHT: 62 IN | DIASTOLIC BLOOD PRESSURE: 80 MMHG

## 2022-04-06 DIAGNOSIS — E78.5 DYSLIPIDEMIA: ICD-10-CM

## 2022-04-06 DIAGNOSIS — R93.89 ABNORMAL CT OF THE CHEST: Primary | ICD-10-CM

## 2022-04-06 DIAGNOSIS — R07.89 OTHER CHEST PAIN: ICD-10-CM

## 2022-04-06 DIAGNOSIS — I25.119 ATHEROSCLEROSIS OF NATIVE CORONARY ARTERY OF NATIVE HEART WITH ANGINA PECTORIS (HCC): ICD-10-CM

## 2022-04-06 DIAGNOSIS — I10 ESSENTIAL HYPERTENSION: ICD-10-CM

## 2022-04-06 DIAGNOSIS — G45.9 TIA (TRANSIENT ISCHEMIC ATTACK): ICD-10-CM

## 2022-04-06 DIAGNOSIS — I10 PRIMARY HYPERTENSION: ICD-10-CM

## 2022-04-06 DIAGNOSIS — R06.09 DOE (DYSPNEA ON EXERTION): ICD-10-CM

## 2022-04-06 PROCEDURE — 1090F PRES/ABSN URINE INCON ASSESS: CPT | Performed by: INTERNAL MEDICINE

## 2022-04-06 PROCEDURE — 1101F PT FALLS ASSESS-DOCD LE1/YR: CPT | Performed by: INTERNAL MEDICINE

## 2022-04-06 PROCEDURE — 3017F COLORECTAL CA SCREEN DOC REV: CPT | Performed by: INTERNAL MEDICINE

## 2022-04-06 PROCEDURE — G8754 DIAS BP LESS 90: HCPCS | Performed by: INTERNAL MEDICINE

## 2022-04-06 PROCEDURE — G8510 SCR DEP NEG, NO PLAN REQD: HCPCS | Performed by: INTERNAL MEDICINE

## 2022-04-06 PROCEDURE — G8427 DOCREV CUR MEDS BY ELIG CLIN: HCPCS | Performed by: INTERNAL MEDICINE

## 2022-04-06 PROCEDURE — 93000 ELECTROCARDIOGRAM COMPLETE: CPT | Performed by: INTERNAL MEDICINE

## 2022-04-06 PROCEDURE — G8536 NO DOC ELDER MAL SCRN: HCPCS | Performed by: INTERNAL MEDICINE

## 2022-04-06 PROCEDURE — G8417 CALC BMI ABV UP PARAM F/U: HCPCS | Performed by: INTERNAL MEDICINE

## 2022-04-06 PROCEDURE — G8752 SYS BP LESS 140: HCPCS | Performed by: INTERNAL MEDICINE

## 2022-04-06 PROCEDURE — G9899 SCRN MAM PERF RSLTS DOC: HCPCS | Performed by: INTERNAL MEDICINE

## 2022-04-06 PROCEDURE — G8399 PT W/DXA RESULTS DOCUMENT: HCPCS | Performed by: INTERNAL MEDICINE

## 2022-04-06 PROCEDURE — 99214 OFFICE O/P EST MOD 30 MIN: CPT | Performed by: INTERNAL MEDICINE

## 2022-04-06 RX ORDER — LEVOTHYROXINE SODIUM 50 UG/1
50 TABLET ORAL
COMMUNITY
Start: 2022-03-05

## 2022-04-06 RX ORDER — PILOCARPINE HYDROCHLORIDE 5 MG/1
TABLET, FILM COATED ORAL
COMMUNITY

## 2022-04-06 RX ORDER — DICLOFENAC SODIUM 10 MG/G
GEL TOPICAL AS NEEDED
COMMUNITY

## 2022-04-06 NOTE — PROGRESS NOTES
Chief Complaint   Patient presents with    Referral / Consult     Last seen by Dr Katja Barton 2020    Hypertension    Cholesterol Problem     Patient has recent scan @ [facility] here for further evaluation.

## 2022-04-06 NOTE — LETTER
4/6/2022    Patient: Yvette Fuentes   YOB: 1950   Date of Visit: 4/6/2022     Ashli Caceres MD  [facility] [id number]  Via Fax: [phone]    Dear Ashli Caceres MD,      Thank you for referring Ms. Judge Alexandre to [address] [place] [address] [facility] for evaluation. My notes for this consultation are attached. If you have questions, please do not hesitate to call me. I look forward to following your patient along with you.       Sincerely,    Wilner Galindo MD

## 2022-04-06 NOTE — PROGRESS NOTES
[address], [place], [address] [facility]  [phone]     Subjective:      Gissell Shea is a 71 y.o. female previously followed by Dr. Mayur Jones in [place]. Hx hypertension, DJD, asthma with cervical pain, intermittent non-radiating CP--non-exertional  Prior Stress MPI 2011 abnormal with subsequent cardiac cath 2011 neg for CAD.  Seen by Dr Jeronimo Mondragon 5/29/18--labs sent (CBC, CMP, lipids--chol 226, , HDL 64, ), thyroids, CPK, trop--normal); Some intolerance to statins in past (lovastatin, crestor) with myalgias, currently back on Crestor 5 mg--ok.  Echo 5/23/18 with LVEF 88-61, grade I diastolic, valves ok. Carotid dopplers 5/23/18 with mild bilateral ICA plaque (16-49%), antegrade vertebrals. Stress MPI 6/4/18 Jose Maria treadmill 3:51, , chest pain prior and throughout test with no changes, no ischemic ST changes,  normal perfusion/no ischemia, LVEF 70. +FH CAD, CVA, hypertension, dyslipidemia. Continues to see PCP--OV 12/2020. Former smoker quit 2013. [facility] monitor 1/19--NSR, rare ectopy, no afib. The patient states that after having a recent abnormal screening CAT scan showing the below cardiac findings, she notes that in hindsight she is a little bit more short of breath than usual, and did have an episode of chest discomfort that awoke him from sleep on approximately March 22 in the setting of stress thinking about her multiple sick family members including 2 sisters and brother-in-law. Denies orthopnea, PND, LE edema, syncope, presyncope or fatigue. is here for a new patient consultation for abnormal cardiac findings on a screening chest CT:    Chest CT at [facility] 3/31/2022: Incidental finding:  Regions of fatty metaplasia involving the myocardium consistent with sequelae of antecedent ischemic event-infarction.     Other findings were small lung nodules unchanged, no new suspicious pulmonary nodules, mild centrilobular emphysema, evidence of small airways disease, mild biapical scarring left greater than right. The patient denies chest pain/ shortness of breath, orthopnea, PND, LE edema, palpitations, syncope, or presyncope. Patient Active Problem List    Diagnosis Date Noted    TIA (transient ischemic attack) 11/07/2018    Other chest pain 06/12/2018    Hypertension     Dyslipidemia       Antoinette Ortiz MD  Past Medical History:   Diagnosis Date    Arthritis     Asthma     Chronic pain     Dyslipidemia     Hypertension     Long term current use of anticoagulant therapy     ASA  & Plavix    Lumbar disc disease     Lumbar spondylosis     Menopause     Migraine headache     Stroke [facility]) 10/2018    Thyroid disease       Past Surgical History:   Procedure Laterality Date    HX APPENDECTOMY  1977    HX CERVICAL LAMINECTOMY  1989, 2005   [facility]     [facility]  2011    [facility]    HX HYSTERECTOMY      HX OOPHORECTOMY      HX ORTHOPAEDIC  2013, 2014    back surgery; wrist surgery      Allergies   Allergen Reactions    Crestor [Rosuvastatin] Myalgia     \"Pain in the liver location. \"   Rylie Treviño Other (comments)     Asthma symptoms    Pcn [Penicillins] Anaphylaxis    Ibuprofen Rash    Levofloxacin Nausea Only      Family History   Problem Relation Age of Onset    Stroke Mother     Hypertension Mother     Dementia Mother     High Cholesterol Mother     Heart Disease Father     Hypertension Father     Kidney Disease Father     High Cholesterol Father     Hypertension Sister     Stroke Sister         Sjogren's      Social History     Socioeconomic History    Marital status:      Spouse name: Not on file    Number of children: Not on file    Years of education: Not on file    Highest education level: Not on file   Occupational History    Not on file   Tobacco Use    Smoking status: Former Smoker     Packs/day: 2.00     Years: 45.00     Pack years: 90.00     Quit date: 12/7/2013     Years since quittin.3    Smokeless tobacco: Never Used   Substance and Sexual Activity    Alcohol use: Yes     Alcohol/week: 5.0 standard drinks     Types: 5 Glasses of wine per week    Drug use: Not Currently    Sexual activity: Yes     Partners: Male   Other Topics Concern    Not on file   Social History Narrative    Not on file     Social Determinants of Health     Financial Resource Strain:     Difficulty of Paying Living Expenses: Not on file   Food Insecurity:     Worried About Running Out of Food in the Last Year: Not on file    Eris of Food in the Last Year: Not on file   Transportation Needs:     Lack of Transportation (Medical): Not on file    Lack of Transportation (Non-Medical): Not on file   Physical Activity:     Days of Exercise per Week: Not on file    Minutes of Exercise per Session: Not on file   Stress:     Feeling of Stress : Not on file   Social Connections:     Frequency of Communication with Friends and Family: Not on file    Frequency of Social Gatherings with Friends and Family: Not on file    Attends Sabianist Services: Not on file    Active Member of [address] [place] [address] or Organizations: Not on file    Attends Club or Organization Meetings: Not on file    Marital Status: Not on file   Intimate Partner Violence:     Fear of Current or Ex-Partner: Not on file    Emotionally Abused: Not on file    Physically Abused: Not on file    Sexually Abused: Not on file   Housing Stability:     Unable to Pay for Housing in the Last Year: Not on file    Number of [place] in the Last Year: Not on file    Unstable Housing in the Last Year: Not on file      Current Outpatient Medications   Medication Sig    synthroid 50 mcg tablet Take 50 mcg by mouth Daily (before breakfast).  diclofenac (VOLTAREN) 1 % gel as needed.  pilocarpine (SALAGEN) 5 mg tablet pilocarpine 5 mg tablet   Take 1 tablet 3 times a day by oral route.     phentermine 37.5 mg capsule Take 37.5 mg by mouth every other day.  conjugated estrogens (Premarin) 0.625 mg/gram vaginal cream every Tuesday and Friday.  potassium citrate (UROCIT-K10) 10 mEq (1,080 mg) TbER Take 2 Tablets by mouth two (2) times a day.  amLODIPine (NORVASC) 2.5 mg tablet TAKE 1 TABLET TWICE A DAY    metoprolol tartrate (LOPRESSOR) 25 mg tablet Take 0.5 Tabs by mouth two (2) times a day. Reduced dose    aspirin delayed-release 81 mg tablet Take 1 Tab by mouth daily. (Patient taking differently: Take 81 mg by mouth every other day.)    clopidogrel (PLAVIX) 75 mg tab Take 1 Tab by mouth daily. (Patient taking differently: Take 75 mg by mouth. EVERY 4 DAYS)    diphenhydrAMINE (BENADRYL) 25 mg capsule Take 25 mg by mouth every six (6) hours as needed.  montelukast (SINGULAIR) 10 mg tablet Take 10 mg by mouth daily.  cyclobenzaprine (FLEXERIL) 10 mg tablet Take 10 mg by mouth three (3) times daily as needed for Muscle Spasm(s).  zolpidem (AMBIEN) 10 mg tablet Take 5 mg by mouth nightly as needed for Sleep. Indications: Takes 5mg or 10mg as needed    albuterol (PROVENTIL VENTOLIN) 2.5 mg /3 mL (0.083 %) nebulizer solution 2.5 mg by Nebulization route every four (4) hours as needed for Wheezing. (Patient not taking: Reported on 4/6/2022)     No current facility-administered medications for this visit. Review of Symptoms:  11 systems reviewed, negative other than as stated in the HPI    Physical ExamPhysical Exam:    Vitals:    04/06/22 1316   BP: 120/80   Pulse: 80   Resp: 16   SpO2: 96%   Weight: 180 lb 8 oz (81.9 kg)   Height: 5' 2\" (1.575 m)     Body mass index is 33.01 kg/m². General PE  Gen:  NAD  Mental Status - Alert. General Appearance - Not in acute distress. HEENT:  PERRL, no carotid bruits or JVD  Chest and Lung Exam   Inspection: Accessory muscles - No use of accessory muscles in breathing.    Auscultation:   Breath sounds: - Normal.   Cardiovascular   Inspection: Jugular vein - Bilateral - Inspection Normal. Palpation/Percussion:   Apical Impulse: - Normal.   Auscultation: Rhythm - Regular. Heart Sounds - S1 WNL and S2 WNL. No S3 or S4. Murmurs & Other Heart Sounds: Auscultation of the heart reveals - No Murmurs. Peripheral Vascular   Upper Extremity: Inspection - Bilateral - No Cyanotic nailbeds or Digital clubbing. Lower Extremity:   Palpation: Edema - Bilateral - No edema. Abdomen:   Soft, non-tender, bowel sounds are active. Neuro: A&O times 3, CN and motor grossly WNL    Labs:   Lab Results   Component Value Date/Time    Cholesterol, total 158 11/08/2018 05:30 AM    HDL Cholesterol 63 11/08/2018 05:30 AM    LDL, calculated 73.2 11/08/2018 05:30 AM    Triglyceride 109 11/08/2018 05:30 AM    CHOL/HDL Ratio 2.5 11/08/2018 05:30 AM     Lab Results   Component Value Date/Time    CK 56 05/14/2018 10:41 AM     Lab Results   Component Value Date/Time    Sodium 139 11/07/2018 11:36 AM    Potassium 3.4 (L) 11/07/2018 11:36 AM    Chloride 102 11/07/2018 11:36 AM    CO2 25 11/07/2018 11:36 AM    Anion gap 12 11/07/2018 11:36 AM    Glucose 107 (H) 11/07/2018 11:36 AM    BUN 20 11/07/2018 11:36 AM    Creatinine 1.06 (H) 11/07/2018 11:36 AM    BUN/Creatinine ratio 19 11/07/2018 11:36 AM    GFR est AA >60 11/07/2018 11:36 AM    GFR est non-AA 52 (L) 11/07/2018 11:36 AM    Calcium 9.2 11/07/2018 11:36 AM    Bilirubin, total 0.3 11/07/2018 11:36 AM    Alk. phosphatase 86 11/07/2018 11:36 AM    Protein, total 8.0 11/07/2018 11:36 AM    Albumin 4.4 11/07/2018 11:36 AM    Globulin 3.6 11/07/2018 11:36 AM    A-G Ratio 1.2 11/07/2018 11:36 AM    ALT (SGPT) 31 11/07/2018 11:36 AM       EKG:  NSR     Assessment:     Assessment:        ICD-10-CM ICD-9-CM    1.  Abnormal CT of the chest  R93.89 793.2 NUCLEAR CARDIAC STRESS TEST      ECHO ADULT COMPLETE      CT HEART W/O CONT WITH CALCIUM   2. Primary hypertension  I10 401.9 AMB POC EKG ROUTINE W/ 12 LEADS, INTER & REP      NUCLEAR CARDIAC STRESS TEST      ECHO ADULT COMPLETE      CT HEART W/O CONT WITH CALCIUM   3. Dyslipidemia  E78.5 272.4 NUCLEAR CARDIAC STRESS TEST      ECHO ADULT COMPLETE      CT HEART W/O CONT WITH CALCIUM   4. Essential hypertension  I10 401.9 NUCLEAR CARDIAC STRESS TEST      ECHO ADULT COMPLETE      CT HEART W/O CONT WITH CALCIUM   5. HATCH (dyspnea on exertion)  R06.00 786.09 NUCLEAR CARDIAC STRESS TEST      ECHO ADULT COMPLETE      CT HEART W/O CONT WITH CALCIUM   6. Atherosclerosis of native coronary artery of native heart with angina pectoris (HCC)  I25.119 414.01 NUCLEAR CARDIAC STRESS TEST     413.9 ECHO ADULT COMPLETE      CT HEART W/O CONT WITH CALCIUM   7. TIA (transient ischemic attack)  G45.9 435.9        Orders Placed This Encounter    CT HEART W/O CONT WITH CALCIUM     Standing Status:   Future     Standing Expiration Date:   5/6/2023     Scheduling Instructions:      Please do not schedule until after stress Cardiolite is verified to be normal     Order Specific Question:   Reason for Exam     Answer:   cv risk    AMB POC EKG ROUTINE W/ 12 LEADS, INTER & REP     Order Specific Question:   Reason for Exam:     Answer:   routine    synthroid 50 mcg tablet     Sig: Take 50 mcg by mouth Daily (before breakfast).  diclofenac (VOLTAREN) 1 % gel     Sig: as needed.  pilocarpine (SALAGEN) 5 mg tablet     Sig: pilocarpine 5 mg tablet   Take 1 tablet 3 times a day by oral route. Plan:     Ms. Lima Escobar is a pleasant 71 y.o. female previously followed by Dr. Sheryl Castro in [place].   Hx hypertension, DJD, asthma with cervical pain, intermittent non-radiating CP--non-exertional  Prior Stress MPI 2011 abnormal with subsequent cardiac cath 2011 neg for CAD.  Seen by Dr Garfield Rogers 5/29/18--labs sent (CBC, CMP, lipids--chol 226, , HDL 64, ), thyroids, CPK, trop--normal); Some intolerance to statins in past (lovastatin, crestor) with myalgias, currently back on Crestor 5 mg--ok.  Echo 5/23/18 with LVEF 59-11, grade I diastolic, valves ok. Carotid dopplers 5/23/18 with mild bilateral ICA plaque (16-49%), antegrade vertebrals. Stress MPI 6/4/18 [place] treadmill 3:51, , chest pain prior and throughout test with no changes, no ischemic ST changes,  normal perfusion/no ischemia, LVEF 70. +FH CAD, CVA, hypertension, dyslipidemia. Now here for evaluation of abnormal cardiac findings on screening lung CT:    Chest CT at [address] [place] [address] 3/31/2022: Incidental finding:  Regions of fatty metaplasia involving the myocardium consistent with sequelae of antecedent ischemic event-infarction. Lipids:  Last , triglycerides 281, HDL 55 on labs faxed 3/31/2022 (exact date of labs is not apparent but suspected to be around that time), and noted statin intolerance with Crestor causing \"pain in the liver region,\" and she states also severe myalgias on 2 occasions with Crestor and previously with pravastatin. In light of dyspnea on exertion and recent chest discomfort episode, I will reevaluate with a stress Myoview (very prominent breast attenuation is expected) and an echo. If stress Myoview is negative or equivocal, Coronary calcium scoring would be reassuring if totally normal and threshold for further testing/treatment would be decreased if high- the patient wishes to proceed. Ordered to be scheduled by radiology after stress test is verified to be normal.    If testing is low risk and symptoms are stable, follow-up with nurse practitioner Yaquelin Young in [place] in 3 months.     Maye Martínez MD

## 2022-04-11 ENCOUNTER — HOSPITAL ENCOUNTER (OUTPATIENT)
Dept: ULTRASOUND IMAGING | Age: 72
Discharge: HOME | End: 2022-04-11
Attending: INTERNAL MEDICINE
Payer: MEDICARE

## 2022-04-11 DIAGNOSIS — I10 PRIMARY HYPERTENSION: ICD-10-CM

## 2022-04-11 DIAGNOSIS — E78.5 DYSLIPIDEMIA: ICD-10-CM

## 2022-04-11 DIAGNOSIS — I25.119 ATHEROSCLEROSIS OF NATIVE CORONARY ARTERY OF NATIVE HEART WITH ANGINA PECTORIS (HCC): ICD-10-CM

## 2022-04-11 DIAGNOSIS — R93.89 ABNORMAL CT OF THE CHEST: ICD-10-CM

## 2022-04-11 DIAGNOSIS — I10 ESSENTIAL HYPERTENSION: ICD-10-CM

## 2022-04-11 DIAGNOSIS — R06.09 DOE (DYSPNEA ON EXERTION): ICD-10-CM

## 2022-04-11 PROCEDURE — 93306 TTE W/DOPPLER COMPLETE: CPT

## 2022-04-12 ENCOUNTER — HOSPITAL ENCOUNTER (OUTPATIENT)
Dept: MAMMOGRAPHY | Age: 72
Discharge: HOME | End: 2022-04-12
Attending: INTERNAL MEDICINE
Payer: MEDICARE

## 2022-04-12 ENCOUNTER — HOSPITAL ENCOUNTER (OUTPATIENT)
Dept: ULTRASOUND IMAGING | Age: 72
Discharge: HOME | End: 2022-04-12
Attending: INTERNAL MEDICINE
Payer: MEDICARE

## 2022-04-12 DIAGNOSIS — R92.8 ABNORMAL MAMMOGRAM: ICD-10-CM

## 2022-04-12 PROCEDURE — 77061 BREAST TOMOSYNTHESIS UNI: CPT

## 2022-04-12 PROCEDURE — 76642 ULTRASOUND BREAST LIMITED: CPT

## 2022-04-13 ENCOUNTER — TELEPHONE (OUTPATIENT)
Dept: CARDIOLOGY CLINIC | Age: 72
End: 2022-04-13

## 2022-04-13 LAB
ECHO AO ROOT DIAM: 3 CM
ECHO AV PEAK GRADIENT: 6 MMHG
ECHO AV PEAK VELOCITY: 1.2 M/S
ECHO EST RA PRESSURE: 10 MMHG
ECHO LA DIAMETER: 3.8 CM
ECHO LA TO AORTIC ROOT RATIO: 1.27
ECHO LA VOL 2C: 64 ML (ref 22–52)
ECHO LA VOL 4C: 23 ML (ref 22–52)
ECHO LA VOLUME AREA LENGTH: 44 ML
ECHO LV E' SEPTAL VELOCITY: 6 CM/S
ECHO LV FRACTIONAL SHORTENING: 30 % (ref 28–44)
ECHO LV INTERNAL DIMENSION DIASTOLIC: 4.3 CM (ref 3.9–5.3)
ECHO LV INTERNAL DIMENSION SYSTOLIC: 3 CM
ECHO LV IVSD: 1.2 CM (ref 0.6–0.9)
ECHO LV MASS 2D: 162.9 G (ref 67–162)
ECHO LV POSTERIOR WALL DIASTOLIC: 1 CM (ref 0.6–0.9)
ECHO LV RELATIVE WALL THICKNESS RATIO: 0.47
ECHO LVOT AREA: 3.1 CM2
ECHO LVOT DIAM: 2 CM
ECHO MV A VELOCITY: 0.78 M/S
ECHO MV E DECELERATION TIME (DT): 212.9 MS
ECHO MV E VELOCITY: 0.52 M/S
ECHO MV E/A RATIO: 0.67
ECHO MV E/E' SEPTAL: 8.67
ECHO RIGHT VENTRICULAR SYSTOLIC PRESSURE (RVSP): 28 MMHG
ECHO TV REGURGITANT MAX VELOCITY: 2.13 M/S
ECHO TV REGURGITANT PEAK GRADIENT: 18 MMHG

## 2022-04-13 PROCEDURE — 93306 TTE W/DOPPLER COMPLETE: CPT | Performed by: INTERNAL MEDICINE

## 2022-04-13 NOTE — TELEPHONE ENCOUNTER
Spoke with patient. Verified with two patient identifiers. Advised pt per [facility] NP, Echo looks good normal pumping heart strength, valves look great and healthy  Patient verbalized understanding.

## 2022-04-13 NOTE — TELEPHONE ENCOUNTER
----- Message from Isa Villaseñor NP sent at 4/13/2022  1:11 PM EDT -----  Echo looks good normal pumping heart strength, valves look great and healthy

## 2022-05-06 ENCOUNTER — HOSPITAL ENCOUNTER (OUTPATIENT)
Dept: NON INVASIVE DIAGNOSTICS | Age: 72
Discharge: HOME | End: 2022-05-06
Attending: INTERNAL MEDICINE
Payer: MEDICARE

## 2022-05-06 ENCOUNTER — HOSPITAL ENCOUNTER (OUTPATIENT)
Dept: NUCLEAR MEDICINE | Age: 72
Discharge: HOME | End: 2022-05-06
Attending: INTERNAL MEDICINE
Payer: MEDICARE

## 2022-05-06 DIAGNOSIS — R93.89 ABNORMAL CT OF THE CHEST: ICD-10-CM

## 2022-05-06 DIAGNOSIS — E78.5 DYSLIPIDEMIA: ICD-10-CM

## 2022-05-06 DIAGNOSIS — R06.09 DOE (DYSPNEA ON EXERTION): ICD-10-CM

## 2022-05-06 DIAGNOSIS — I25.119 ATHEROSCLEROSIS OF NATIVE CORONARY ARTERY OF NATIVE HEART WITH ANGINA PECTORIS (HCC): ICD-10-CM

## 2022-05-06 DIAGNOSIS — I10 PRIMARY HYPERTENSION: ICD-10-CM

## 2022-05-06 DIAGNOSIS — Z92.89 H/O EXERCISE STRESS TEST: ICD-10-CM

## 2022-05-06 DIAGNOSIS — I10 ESSENTIAL HYPERTENSION: ICD-10-CM

## 2022-05-06 LAB
STRESS ANGINA INDEX: 0
STRESS BASELINE DIAS BP: 80 MMHG
STRESS BASELINE HR: 88 BPM
STRESS BASELINE ST DEPRESSION: 0 MM
STRESS BASELINE SYS BP: 130 MMHG
STRESS ESTIMATED WORKLOAD: 4.2 METS
STRESS EXERCISE DUR MIN: 2 MIN
STRESS EXERCISE DUR SEC: 6 SEC
STRESS O2 SAT PEAK: 92 %
STRESS O2 SAT REST: 95 %
STRESS PEAK DIAS BP: 90 MMHG
STRESS PEAK SYS BP: 162 MMHG
STRESS PERCENT HR ACHIEVED: 97 %
STRESS POST PEAK HR: 144 BPM
STRESS RATE PRESSURE PRODUCT: NORMAL BPM*MMHG
STRESS STAGE 1 BP: NORMAL MMHG
STRESS STAGE 1 COMMENTS: NORMAL
STRESS STAGE 1 DURATION: NORMAL MIN:SEC
STRESS STAGE 1 HR: 136 BPM
STRESS STAGE 2 BP: NORMAL MMHG
STRESS STAGE 2 DURATION: NORMAL MIN:SEC
STRESS STAGE 2 HR: 137 BPM
STRESS STAGE RECOVERY 1 BP: NORMAL MMHG
STRESS STAGE RECOVERY 1 DURATION: NORMAL MIN:SEC
STRESS STAGE RECOVERY 1 HR: 103 BPM
STRESS TARGET HR: 149 BPM

## 2022-05-06 PROCEDURE — APPNB30 APP NON BILLABLE TIME 0-30 MINS: Performed by: NURSE PRACTITIONER

## 2022-05-06 PROCEDURE — 93017 CV STRESS TEST TRACING ONLY: CPT

## 2022-05-06 PROCEDURE — A9500 TC99M SESTAMIBI: HCPCS

## 2022-05-06 RX ORDER — TETRAKIS(2-METHOXYISOBUTYLISOCYANIDE)COPPER(I) TETRAFLUOROBORATE 1 MG/ML
10.8 INJECTION, POWDER, LYOPHILIZED, FOR SOLUTION INTRAVENOUS
Status: COMPLETED | OUTPATIENT
Start: 2022-05-06 | End: 2022-05-06

## 2022-05-06 RX ORDER — TETRAKIS(2-METHOXYISOBUTYLISOCYANIDE)COPPER(I) TETRAFLUOROBORATE 1 MG/ML
31.8 INJECTION, POWDER, LYOPHILIZED, FOR SOLUTION INTRAVENOUS
Status: COMPLETED | OUTPATIENT
Start: 2022-05-06 | End: 2022-05-06

## 2022-05-06 RX ADMIN — TETRAKIS(2-METHOXYISOBUTYLISOCYANIDE)COPPER(I) TETRAFLUOROBORATE 31.8 MILLICURIE: 1 INJECTION, POWDER, LYOPHILIZED, FOR SOLUTION INTRAVENOUS at 10:40

## 2022-05-06 RX ADMIN — TETRAKIS(2-METHOXYISOBUTYLISOCYANIDE)COPPER(I) TETRAFLUOROBORATE 10.8 MILLICURIE: 1 INJECTION, POWDER, LYOPHILIZED, FOR SOLUTION INTRAVENOUS at 07:55

## 2022-05-08 NOTE — PROGRESS NOTES
Please advise patient stress test without evidence of flow-limiting blockages in the arteries of the heart. However, she c/o sob after walking about 2 minutes which is showed poor exercise capacity. Please proceed with CT heart scan. Follow up with dr Geeta Yuan after testing.

## 2022-05-09 ENCOUNTER — TELEPHONE (OUTPATIENT)
Dept: CARDIOLOGY CLINIC | Age: 72
End: 2022-05-09

## 2022-05-09 NOTE — TELEPHONE ENCOUNTER
----- Message from Galen Hernandez NP sent at 5/8/2022  5:22 PM EDT -----  Please advise patient stress test without evidence of flow-limiting blockages in the arteries of the heart. However, she c/o sob after walking about 2 minutes which is showed poor exercise capacity. Please proceed with CT heart scan. Follow up with dr Martin Pool after testing.

## 2022-06-29 ENCOUNTER — TELEPHONE (OUTPATIENT)
Dept: CARDIOLOGY CLINIC | Age: 72
End: 2022-06-29

## 2022-06-29 RX ORDER — METOPROLOL TARTRATE 25 MG/1
12.5 TABLET, FILM COATED ORAL 2 TIMES DAILY
Qty: 90 TABLET | Refills: 3 | Status: SHIPPED | OUTPATIENT
Start: 2022-06-29

## 2022-07-05 NOTE — PROGRESS NOTES
[place] [address], [address]  [phone]  [address] [facility] [place] [facility] [place], [address]     Subjective:      Juliane Blackmaribell, ADAMA-BC    Yg Haque is a 71 y.o. female is here for routine f/u. Hx hypertension, DJD, asthma with cervical pain, intermittent non-radiating CP--non-exertional  Prior Stress MPI 2011 abnormal with subsequent cardiac cath 2011 neg for CAD.  Seen by Dr Trey Quinn 5/29/18--labs sent (CBC, CMP, lipids--chol 226, , HDL 64, ), thyroids, CPK, trop--normal); Some intolerance to statins in past (lovastatin, crestor) with myalgias, currently back on Crestor 5 mg--ok.  Echo 5/23/18 with LVEF 90-62, grade I diastolic, valves ok. Carotid dopplers 5/23/18 with mild bilateral ICA plaque (16-49%), antegrade vertebrals. Stress MPI 6/4/18 Jose Maria treadmill 3:51, , chest pain prior and throughout test with no changes, no ischemic ST changes,  normal perfusion/no ischemia, LVEF 70. +FH CAD, CVA, hypertension, dyslipidemia. Continues to see PCP--OV 12/2020. Former smoker quit 2013. Marely Schafer monitor 1/19--NSR, rare ectopy, no afib.       Seen by Dr Jhonny Alvarez in 4/2022 for abnormal cardiac findings on a screening chest CT:  The patient states that after having a recent abnormal screening CAT scan showing the below cardiac findings, she notes that in hindsight she is a little bit more short of breath than usual, and did have an episode of chest discomfort that awoke him from sleep on approximately March 22 in the setting of stress thinking about her multiple sick family members including 2 sisters and brother-in-law. Denies orthopnea, PND, LE edema, syncope, presyncope or fatigue.         Today,  Endorses more of the sob,  Occasional cp--not sure if heart related or gas. She will arrange her CT heart scan.   Also getting pulmonary work up with Dr Kan Gamboa    The patient denies orthopnea, PND, LE edema, palpitations, syncope, or presyncope.          Chest CT at [facility] 3/31/2022: Incidental finding:  Regions of fatty metaplasia involving the myocardium consistent with sequelae of antecedent ischemic event-infarction.     Other findings were small lung nodules unchanged, no new suspicious pulmonary nodules, mild centrilobular emphysema, evidence of small airways disease, mild biapical scarring left greater than right.       Patient Active Problem List    Diagnosis Date Noted    TIA (transient ischemic attack) 11/07/2018    Other chest pain 06/12/2018    Hypertension     Dyslipidemia       Antoinette Ortiz MD  Past Medical History:   Diagnosis Date    Arthritis     Asthma     Chronic obstructive pulmonary disease [address] [address]     Chronic pain     Dyslipidemia     Hypertension     Ill-defined condition     Kidney stones    Long term current use of anticoagulant therapy     ASA  & Plavix    Lumbar disc disease     Lumbar spondylosis     Menopause     Migraine headache     Stroke ([facility] [address] 10/2018    Thyroid disease       Past Surgical History:   Procedure Laterality Date    HX APPENDECTOMY  1977    HX CERVICAL LAMINECTOMY  1989, 2005    [facility]HE[facility]  2011    [facility]  Cardiology    HX HYSTERECTOMY      HX OOPHORECTOMY      HX ORTHOPAEDIC  2013, 2014    back surgery; wrist surgery     HX ORTHOPAEDIC  1987    anterior cervical fusion C4-5    HX ORTHOPAEDIC  1984    cervical shaved C6     Allergies   Allergen Reactions    Crestor [Rosuvastatin] Myalgia     \"Pain in the liver location. \"   Willis Littler Other (comments)     Asthma symptoms    Pcn [Penicillins] Anaphylaxis    Ibuprofen Rash    Levofloxacin Nausea Only      Family History   Problem Relation Age of Onset    Stroke Mother     Hypertension Mother     Dementia Mother     High Cholesterol Mother     Heart Disease Father     Hypertension Father     Kidney Disease Father     High Cholesterol Father     Hypertension Sister     Stroke Sister         Sjogren's      Social History Socioeconomic History    Marital status:      Spouse name: Not on file    Number of children: Not on file    Years of education: Not on file    Highest education level: Not on file   Occupational History    Not on file   Tobacco Use    Smoking status: Former Smoker     Packs/day: 2.00     Years: 45.00     Pack years: 90.00     Quit date: 2013     Years since quittin.6    Smokeless tobacco: Never Used   Vaping Use    Vaping Use: Never used   Substance and Sexual Activity    Alcohol use: Yes     Alcohol/week: 5.0 standard drinks     Types: 5 Glasses of wine per week     Comment: 5 glasses a week    Drug use: Not Currently    Sexual activity: Yes     Partners: Male   Other Topics Concern    Not on file   Social History Narrative    Not on file     Social Determinants of Health     Financial Resource Strain:     Difficulty of Paying Living Expenses: Not on file   Food Insecurity:     Worried About Running Out of Food in the Last Year: Not on file    Eris of Food in the Last Year: Not on file   Transportation Needs:     Lack of Transportation (Medical): Not on file    Lack of Transportation (Non-Medical):  Not on file   Physical Activity:     Days of Exercise per Week: Not on file    Minutes of Exercise per Session: Not on file   Stress:     Feeling of Stress : Not on file   Social Connections:     Frequency of Communication with Friends and Family: Not on file    Frequency of Social Gatherings with Friends and Family: Not on file    Attends Christianity Services: Not on file    Active Member of Clubs or Organizations: Not on file    Attends Club or Organization Meetings: Not on file    Marital Status: Not on file   Intimate Partner Violence:     Fear of Current or Ex-Partner: Not on file    Emotionally Abused: Not on file    Physically Abused: Not on file    Sexually Abused: Not on file   Housing Stability:     Unable to Pay for Housing in the Last Year: Not on file    Number of Places Lived in the Last Year: Not on file    Unstable Housing in the Last Year: Not on file      Current Outpatient Medications   Medication Sig    Breztri Aerosphere 160-9-4.8 mcg/actuation HFAA Take 2 Puffs by inhalation two (2) times a day.  cholecalciferol, vitamin D3, (VITAMIN D3 PO) Take  by mouth.  metoprolol tartrate (LOPRESSOR) 25 mg tablet Take 0.5 Tablets by mouth two (2) times a day. Reduced dose    synthroid 50 mcg tablet Take 50 mcg by mouth Daily (before breakfast).  diclofenac (VOLTAREN) 1 % gel as needed.  pilocarpine (SALAGEN) 5 mg tablet pilocarpine 5 mg tablet   Take 1 tablet 3 times a day by oral route.  phentermine 37.5 mg capsule Take 37.5 mg by mouth every morning.  potassium citrate (UROCIT-K10) 10 mEq (1,080 mg) TbER Take 2 Tablets by mouth two (2) times a day.  amLODIPine (NORVASC) 2.5 mg tablet TAKE 1 TABLET TWICE A DAY    aspirin delayed-release 81 mg tablet Take 1 Tab by mouth daily. (Patient taking differently: Take 81 mg by mouth every other day.)    clopidogrel (PLAVIX) 75 mg tab Take 1 Tab by mouth daily. (Patient taking differently: Take 75 mg by mouth. EVERY 4 DAYS)    diphenhydrAMINE (BENADRYL) 25 mg capsule Take 25 mg by mouth every six (6) hours as needed.  montelukast (SINGULAIR) 10 mg tablet Take 10 mg by mouth daily.  albuterol (PROVENTIL VENTOLIN) 2.5 mg /3 mL (0.083 %) nebulizer solution 2.5 mg by Nebulization route every four (4) hours as needed for Wheezing.  cyclobenzaprine (FLEXERIL) 10 mg tablet Take 10 mg by mouth three (3) times daily as needed for Muscle Spasm(s).  zolpidem (AMBIEN) 10 mg tablet Take 5 mg by mouth nightly as needed for Sleep. Indications: Takes 5mg or 10mg as needed    conjugated estrogens (Premarin) 0.625 mg/gram vaginal cream every Tuesday and Friday. (Patient not taking: Reported on 7/12/2022)     No current facility-administered medications for this visit.            Review of Symptoms:  11 systems reviewed, negative other than as stated in the HPI    Physical ExamPhysical Exam:    Vitals:    07/12/22 0846   BP: 102/64   Pulse: 68   Resp: 18   Temp: 98.4 °F (36.9 °C)   TempSrc: Temporal   SpO2: 97%   Weight: 181 lb (82.1 kg)   Height: 5' 2\" (1.575 m)     Body mass index is 33.11 kg/m². General PE   General:  Well developed, in no acute distress, cooperative and alert  HEENT: No carotid bruits, no JVD, trach is midline. Neck Supple, PEERL, EOM intact. Heart:  reg rate and rhythm; normal S1/S2; no murmurs, gallops or rubs. Respiratory: Clear bilaterally x 4, no wheezing or rales  Abdomen:   Soft, non-tender, no distention, no masses. + BS. Extremities:  Normal cap refill, no cyanosis, atraumatic. No edema. Neuro: A&Ox3, speech clear, gait stable. Skin: Skin color is normal. No rashes or lesions. Non diaphoretic  Vascular: 2+ pulses symmetric in all extremities    Labs:   Lab Results   Component Value Date/Time    Cholesterol, total 158 11/08/2018 05:30 AM    HDL Cholesterol 63 11/08/2018 05:30 AM    LDL, calculated 73.2 11/08/2018 05:30 AM    Triglyceride 109 11/08/2018 05:30 AM    CHOL/HDL Ratio 2.5 11/08/2018 05:30 AM     Lab Results   Component Value Date/Time    CK 56 05/14/2018 10:41 AM     Lab Results   Component Value Date/Time    Sodium 139 11/07/2018 11:36 AM    Potassium 3.4 (L) 11/07/2018 11:36 AM    Chloride 102 11/07/2018 11:36 AM    CO2 25 11/07/2018 11:36 AM    Anion gap 12 11/07/2018 11:36 AM    Glucose 107 (H) 11/07/2018 11:36 AM    BUN 20 11/07/2018 11:36 AM    Creatinine 1.06 (H) 11/07/2018 11:36 AM    BUN/Creatinine ratio 19 11/07/2018 11:36 AM    GFR est AA >60 11/07/2018 11:36 AM    GFR est non-AA 52 (L) 11/07/2018 11:36 AM    Calcium 9.2 11/07/2018 11:36 AM    Bilirubin, total 0.3 11/07/2018 11:36 AM    Alk.  phosphatase 86 11/07/2018 11:36 AM    Protein, total 8.0 11/07/2018 11:36 AM    Albumin 4.4 11/07/2018 11:36 AM    Globulin 3.6 11/07/2018 11:36 AM    A-G Ratio 1.2 11/07/2018 11:36 AM    ALT (SGPT) 31 11/07/2018 11:36 AM       EKG:       Assessment:     Assessment:        ICD-10-CM ICD-9-CM    1. Essential hypertension  I10 401.9    2. Dyslipidemia  E78.5 272.4    3. HATCH (dyspnea on exertion)  R06.00 786.09    4. Palpitations  R00.2 785.1    5. 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 Abnormal CT of the chest  R93.89 793.2        Orders Placed This Encounter    Breztri Aerosphere 160-9-4.8 mcg/actuation HFAA     Sig: Take 2 Puffs by inhalation two (2) times a day.  cholecalciferol, vitamin D3, (VITAMIN D3 PO)     Sig: Take  by mouth.       04/11/22    ECHO ADULT COMPLETE 04/13/2022 4/13/2022    Interpretation Summary    Left Ventricle: Left ventricle size is normal. Mild basal septal thickening. Normal wall motion. Normal left ventricular systolic function with a visually estimated EF of 55 - 60%. Normal diastolic function. Signed by: Mayford Shone, MD on 4/13/2022  9:03 AM    05/06/22    NUCLEAR CARDIAC STRESS TEST 05/06/2022, 05/06/2022 5/6/2022    Interpretation Summary    ECG: Resting ECG demonstrates normal sinus rhythm.   Stress Test: A Jose Maria protocol stress test was performed. Overall, the patient's exercise capacity was below average for their age. The patient exercised for 2 min and 6 sec. Speed/angle adjusted on the treadmill after 1:49 r/t pt dyspnea per NP. Blood pressure demonstrated a normal response and heart rate demonstrated a normal response to stress. The patient's heart rate recovery was normal.    Non-diagnostic stress test.  Nuclear images to follow. TWI inferior leads once standing. Became very dyspneic very quickly. INDICATION: Chest pain, hypertension. COMPARISON:  None.     CORRELATIVE IMAGING STUDIES:  None    TRACER: Tc 99m Sestamibi    TECHNIQUE:  Resting SPECT images of the heart were obtained following the uneventful intravenous administration of 10.8 mCi of Tc 99m Sestamibi. Gated stress SPECT images of the heart were obtained following Jose Maria exercise protocol and the uneventful intravenous administration of 31.8 mCi of Tc 99m Sestamibi. FINDINGS:  The rest and stress perfusion images demonstrate no significant perfusion defect or evidence of myocardial reversibility. The gated images demonstrate normal global and regional wall motion and thickening. Left ventricular ejection fraction is 87 %. Impression:  Normal gated rest/stress myocardial perfusion imaging study. No evidence of myocardial ischemia or infarction. Left ventricular ejection fraction is 87 %. Signed by: Rosa Haji MD on 5/6/2022 12:52 PM, Signed by: Yesica Shaffer MD on 5/6/2022  2:06 PM     Plan:         HATCH/precordial pain  Normal EF 55-60%  valves ok per echo in 5/2022.  Shirline Seton 5/23/18 with LVEF 52-33, grade I diastolic, valves ok  NST in 5/2022 Normal gated rest/stress myocardial perfusion imaging study. No evidence of myocardial ischemia or infarction. Left ventricular ejection fraction is 87 %. Stress MPI 6/4/18 Jose Maria treadmill 3:51, , chest pain prior and throughout test with no changes, no ischemic ST changes,  normal perfusion/no ischemia, LVEF 70  Recall Stress MPI 2011 abnormal with subsequent cardiac cath 2011 neg for CAD--[facility]  Awaiting Coronary calcium score  On ASA/Plavix, BB, CCB    Chest CT at [facility] 3/31/2022:   Incidental finding:  Regions of fatty metaplasia involving the myocardium consistent with sequelae of antecedent ischemic event-infarction    HTN  Controlled with current therapy  Serum Cr 1.2 in 1/2022    HLD  Last , triglycerides 281, HDL 55 on labs faxed 3/31/2022 (exact date of labs is not apparent but suspected to be around that time), and noted statin intolerance with Crestor causing \"pain in the liver region,\" and she states also severe myalgias on 2 occasions with Crestor and previously with pravastatin. Labs and lipids per PCP    Hx TIA in 9/2018  Carotid artery disease Duplex in 2018  1.  16-49% stenosis in the right ICA. 2.  16-49% stenosis in the left ICA. On ASA/Plavix, statin intolerant    Patient was made aware during visit today that all testing completed would be instantaneously available on their MyChart for review. Discussed that these results will be made available to the provider at the same time. They were advised to wait at least 3 business days to allow for provider's interpretation of results with follow-up before calling our office with concerns about their results.     Continue current care and f/u in 1 mos    Ortiz Lira NP

## 2022-07-12 ENCOUNTER — OFFICE VISIT (OUTPATIENT)
Dept: CARDIOLOGY CLINIC | Age: 72
End: 2022-07-12
Payer: MEDICARE

## 2022-07-12 ENCOUNTER — TELEPHONE (OUTPATIENT)
Dept: CARDIOLOGY CLINIC | Age: 72
End: 2022-07-12

## 2022-07-12 VITALS
HEART RATE: 68 BPM | HEIGHT: 62 IN | DIASTOLIC BLOOD PRESSURE: 64 MMHG | TEMPERATURE: 98.4 F | BODY MASS INDEX: 33.31 KG/M2 | WEIGHT: 181 LBS | OXYGEN SATURATION: 97 % | SYSTOLIC BLOOD PRESSURE: 102 MMHG | RESPIRATION RATE: 18 BRPM

## 2022-07-12 DIAGNOSIS — R93.89 ABNORMAL CT OF THE CHEST: ICD-10-CM

## 2022-07-12 DIAGNOSIS — E78.5 DYSLIPIDEMIA: ICD-10-CM

## 2022-07-12 DIAGNOSIS — I25.119 ATHEROSCLEROSIS OF NATIVE CORONARY ARTERY OF NATIVE HEART WITH ANGINA PECTORIS (HCC): ICD-10-CM

## 2022-07-12 DIAGNOSIS — I10 ESSENTIAL HYPERTENSION: Primary | ICD-10-CM

## 2022-07-12 DIAGNOSIS — R00.2 PALPITATIONS: ICD-10-CM

## 2022-07-12 DIAGNOSIS — R06.09 DOE (DYSPNEA ON EXERTION): ICD-10-CM

## 2022-07-12 PROCEDURE — 3017F COLORECTAL CA SCREEN DOC REV: CPT | Performed by: NURSE PRACTITIONER

## 2022-07-12 PROCEDURE — G8417 CALC BMI ABV UP PARAM F/U: HCPCS | Performed by: NURSE PRACTITIONER

## 2022-07-12 PROCEDURE — G8752 SYS BP LESS 140: HCPCS | Performed by: NURSE PRACTITIONER

## 2022-07-12 PROCEDURE — 99214 OFFICE O/P EST MOD 30 MIN: CPT | Performed by: NURSE PRACTITIONER

## 2022-07-12 PROCEDURE — G8432 DEP SCR NOT DOC, RNG: HCPCS | Performed by: NURSE PRACTITIONER

## 2022-07-12 PROCEDURE — G8536 NO DOC ELDER MAL SCRN: HCPCS | Performed by: NURSE PRACTITIONER

## 2022-07-12 PROCEDURE — G8754 DIAS BP LESS 90: HCPCS | Performed by: NURSE PRACTITIONER

## 2022-07-12 PROCEDURE — 1123F ACP DISCUSS/DSCN MKR DOCD: CPT | Performed by: NURSE PRACTITIONER

## 2022-07-12 PROCEDURE — 1090F PRES/ABSN URINE INCON ASSESS: CPT | Performed by: NURSE PRACTITIONER

## 2022-07-12 PROCEDURE — G8399 PT W/DXA RESULTS DOCUMENT: HCPCS | Performed by: NURSE PRACTITIONER

## 2022-07-12 PROCEDURE — G8427 DOCREV CUR MEDS BY ELIG CLIN: HCPCS | Performed by: NURSE PRACTITIONER

## 2022-07-12 PROCEDURE — G9899 SCRN MAM PERF RSLTS DOC: HCPCS | Performed by: NURSE PRACTITIONER

## 2022-07-12 PROCEDURE — 1101F PT FALLS ASSESS-DOCD LE1/YR: CPT | Performed by: NURSE PRACTITIONER

## 2022-07-12 RX ORDER — BUDESONIDE, GLYCOPYRROLATE, AND FORMOTEROL FUMARATE 160; 9; 4.8 UG/1; UG/1; UG/1
2 AEROSOL, METERED RESPIRATORY (INHALATION) 2 TIMES DAILY
COMMUNITY
Start: 2022-06-21

## 2022-07-12 NOTE — LETTER
7/12/2022    Patient: Sybil Rinaldi   YOB: 1950   Date of Visit: 7/12/2022     Meghna Pérez MD  [facility] [address] [id number]  Via Fax: [phone]    Dear Meghna Pérez MD,      Thank you for referring Ms. Ronnie Magdaleno to [facility] for evaluation. My notes for this consultation are attached. If you have questions, please do not hesitate to call me. I look forward to following your patient along with you.       Sincerely,    Jessica Umana NP

## 2022-07-12 NOTE — PROGRESS NOTES
Identified pt with two pt identifiers(name and ). Reviewed record in preparation for visit and have obtained necessary documentation. Chief Complaint   Patient presents with    Results     Follow up post stress test      Vitals:    22 0846   BP: 102/64   Pulse: 68   Resp: 18   Temp: 98.4 °F (36.9 °C)   TempSrc: Temporal   SpO2: 97%   Weight: 181 lb (82.1 kg)   Height: 5' 2\" (1.575 m)   PainSc:   0 - No pain       Medications reviewed/approved by provider. Health Maintenance Review: Patient reminded of \"due or due soon\" health maintenance. I have asked the patient to contact his/her primary care provider (PCP) for follow-up on his/her health maintenance. Coordination of Care Questionnaire:  :   1) Have you been to an emergency room, urgent care, or hospitalized since your last visit? If yes, where when, and reason for visit? no       2. Have seen or consulted any other health care provider since your last visit? If yes, where when, and reason for visit? [facility] for routine care. Dr. Hinds pulmonary. Patient is accompanied by self I have received verbal consent from Grace Maldonado to discuss any/all medical information while they are present in the room.

## 2022-08-05 ENCOUNTER — HOSPITAL ENCOUNTER (OUTPATIENT)
Dept: GENERAL RADIOLOGY | Age: 72
Discharge: HOME | End: 2022-08-05
Payer: MEDICARE

## 2022-08-05 ENCOUNTER — TRANSCRIBE ORDER (OUTPATIENT)
Dept: REGISTRATION | Age: 72
End: 2022-08-05

## 2022-08-05 DIAGNOSIS — R05.1 ACUTE COUGH: Primary | ICD-10-CM

## 2022-08-05 DIAGNOSIS — R05.1 ACUTE COUGH: ICD-10-CM

## 2022-08-05 PROCEDURE — 71046 X-RAY EXAM CHEST 2 VIEWS: CPT

## 2022-08-08 RX ORDER — AMLODIPINE BESYLATE 2.5 MG/1
TABLET ORAL
Qty: 180 TABLET | Refills: 0 | Status: SHIPPED | OUTPATIENT
Start: 2022-08-08 | End: 2022-11-03

## 2022-08-08 NOTE — TELEPHONE ENCOUNTER
PCP: Darling DIETRICH MD    Last appt: 7/12/2022  Future Appointments   Date Time Provider Travis Claudio   8/17/2022 10:30 AM [facility] 1 [facility]   8/25/2022 10:00 AM Sandeep Decker NP [facility]       Requested Prescriptions     Signed Prescriptions Disp Refills    amLODIPine (NORVASC) 2.5 mg tablet 180 Tablet 0     Sig: TAKE 1 TABLET TWICE A DAY     Authorizing Provider: Cuca Marrero     Ordering User: GRAHAM BAUMAN         Other Comments:  Verbal order per provider. Order (medication, dose, route, frequency, amount, refills) repeated and verified twice.

## 2022-08-17 ENCOUNTER — HOSPITAL ENCOUNTER (OUTPATIENT)
Dept: CT IMAGING | Age: 72
Discharge: HOME | End: 2022-08-17
Attending: INTERNAL MEDICINE
Payer: SELF-PAY

## 2022-08-17 DIAGNOSIS — I10 PRIMARY HYPERTENSION: ICD-10-CM

## 2022-08-17 DIAGNOSIS — E78.5 DYSLIPIDEMIA: ICD-10-CM

## 2022-08-17 DIAGNOSIS — R06.09 DOE (DYSPNEA ON EXERTION): ICD-10-CM

## 2022-08-17 DIAGNOSIS — I25.119 ATHEROSCLEROSIS OF NATIVE CORONARY ARTERY OF NATIVE HEART WITH ANGINA PECTORIS (HCC): ICD-10-CM

## 2022-08-17 DIAGNOSIS — I10 ESSENTIAL HYPERTENSION: ICD-10-CM

## 2022-08-17 DIAGNOSIS — R93.89 ABNORMAL CT OF THE CHEST: ICD-10-CM

## 2022-08-17 PROCEDURE — 75571 CT HRT W/O DYE W/CA TEST: CPT

## 2022-08-17 NOTE — PROGRESS NOTES
Coronary calcium score showed mild plaque build up in her coronary arteries. Continue ASA, noted statin intolerance. Continue to follow a low cholesterol diet. Try to limit the amount of fried foods, fatty foods, butter, gravy, red meat, ice cream, cheese, and eggs in your diet, which are all high in cholesterol.

## 2022-08-22 NOTE — PROGRESS NOTES
[place] [address], [address]  [phone]  [address] [facility] [place], [address]     Subjective:      [employer], AGACNP-BC    Sam Daniel is a 71 y.o. female is here for routine f/u. Hx hypertension, DJD, asthma with cervical pain, intermittent non-radiating CP--non-exertional  Prior Stress MPI 2011 abnormal with subsequent cardiac cath 2011 neg for CAD. Seen by Dr Alma Rosa Monte 5/29/18--labs sent (CBC, CMP, lipids--chol 226, , HDL 64, ), thyroids, CPK, trop--normal); Some intolerance to statins in past (lovastatin, crestor) with myalgias, currently back on Crestor 5 mg--ok. Echo 5/23/18 with LVEF 38-00, grade I diastolic, valves ok. Carotid dopplers 5/23/18 with mild bilateral ICA plaque (16-49%), antegrade vertebrals. Stress MPI 6/4/18 Jose Maria treadmill 3:51, , chest pain prior and throughout test with no changes, no ischemic ST changes,  normal perfusion/no ischemia, LVEF 70. +FH CAD, CVA, hypertension, dyslipidemia. Continues to see PCP--OV 12/2020. Former smoker quit 2013. Holter monitor 1/19--NSR, rare ectopy, no afib. Seen by Dr Mimi Edge in 4/2022 for abnormal cardiac findings on a screening chest CT:  The patient states that after having a recent abnormal screening CAT scan showing the below cardiac findings, she notes that in hindsight she is a little bit more short of breath than usual, and did have an episode of chest discomfort that awoke him from sleep on approximately March 22 in the setting of stress thinking about her multiple sick family members including 2 sisters and brother-in-law. Denies orthopnea, PND, LE edema, syncope, presyncope or fatigue. Seen by me 7/12 /2022 Endorses more of the sob,  Occasional cp--not sure if heart related or gas. She will arrange her CT heart scan. Also getting pulmonary work up with Dr Shayna Rodriguez    Had Asim CAC score of 54 per CT heart scan 8/17/2022.     Today  Saw PCP 8/6/2022, labs and CXR were done but no result to review. Completed steroid taper. No further palpitation since off phentermine and steroid  Stable sob    The patient denies chest pain,  orthopnea, PND, LE edema, palpitations, syncope, presyncope or fatigue. Chest CT at [facility] 3/31/2022: Incidental finding:  Regions of fatty metaplasia involving the myocardium consistent with sequelae of antecedent ischemic event-infarction. Other findings were small lung nodules unchanged, no new suspicious pulmonary nodules, mild centrilobular emphysema, evidence of small airways disease, mild biapical scarring left greater than right. Patient Active Problem List    Diagnosis Date Noted    TIA (transient ischemic attack) 11/07/2018    Other chest pain 06/12/2018    Hypertension     Dyslipidemia       Antoinette Ortiz MD  Past Medical History:   Diagnosis Date    Arthritis     Asthma     Chronic obstructive pulmonary disease ([facility] [address]     Chronic pain     Dyslipidemia     Hypertension     Ill-defined condition     Kidney stones    Long term current use of anticoagulant therapy     ASA  & Plavix    Lumbar disc disease     Lumbar spondylosis     Menopause     Migraine headache     Stroke ([facility] [address] 10/2018    Thyroid disease       Past Surgical History:   Procedure Laterality Date    HX APPENDECTOMY  1977    HX CERVICAL LAMINECTOMY  1989, 2005    HX GYN      HX HEART CATHETERIZATION  2011    [facility]  Cardiology    HX HYSTERECTOMY      HX OOPHORECTOMY      HX ORTHOPAEDIC  2013, 2014    back surgery; wrist surgery     HX ORTHOPAEDIC  1987    anterior cervical fusion C4-5    HX ORTHOPAEDIC  1984    cervical shaved C6     Allergies   Allergen Reactions    Crestor [Rosuvastatin] Myalgia     \"Pain in the liver location. \"    Mold Other (comments)     Asthma symptoms    Pcn [Penicillins] Anaphylaxis    Ibuprofen Rash    Levofloxacin Nausea Only      Family History   Problem Relation Age of Onset    Stroke Mother     Hypertension Mother     Dementia Mother     High Cholesterol Mother     Heart Disease Father     Hypertension Father     Kidney Disease Father     High Cholesterol Father     Hypertension Sister     Stroke Sister         Sjogrrk's      Social History     Socioeconomic History    Marital status:      Spouse name: Not on file    Number of children: Not on file    Years of education: Not on file    Highest education level: Not on file   Occupational History    Not on file   Tobacco Use    Smoking status: Former     Packs/day: 2.00     Years: 45.00     Pack years: 90.00     Types: Cigarettes     Quit date: 2013     Years since quittin.7    Smokeless tobacco: Never   Vaping Use    Vaping Use: Never used   Substance and Sexual Activity    Alcohol use: Yes     Alcohol/week: 5.0 standard drinks     Types: 5 Glasses of wine per week     Comment: 5 glasses a week    Drug use: Not Currently    Sexual activity: Yes     Partners: Male   Other Topics Concern    Not on file   Social History Narrative    Not on file     Social Determinants of Health     Financial Resource Strain: Not on file   Food Insecurity: Not on file   Transportation Needs: Not on file   Physical Activity: Not on file   Stress: Not on file   Social Connections: Not on file   Intimate Partner Violence: Not on file   Housing Stability: Not on file      Current Outpatient Medications   Medication Sig    amLODIPine (NORVASC) 2.5 mg tablet TAKE 1 TABLET TWICE A DAY    Breztri Aerosphere 160-9-4.8 mcg/actuation HFAA Take 2 Puffs by inhalation two (2) times a day. cholecalciferol, vitamin D3, (VITAMIN D3 PO) Take  by mouth.    metoprolol tartrate (LOPRESSOR) 25 mg tablet Take 0.5 Tablets by mouth two (2) times a day. Reduced dose    synthroid 50 mcg tablet Take 50 mcg by mouth Daily (before breakfast). diclofenac (VOLTAREN) 1 % gel as needed. pilocarpine (SALAGEN) 5 mg tablet pilocarpine 5 mg tablet   Take 1 tablet 3 times a day by oral route.     conjugated estrogens (Premarin) 0.625 mg/gram vaginal cream every Tuesday and Friday. potassium citrate (UROCIT-K10) 10 mEq (1,080 mg) TbER Take 2 Tablets by mouth two (2) times a day. aspirin delayed-release 81 mg tablet Take 1 Tab by mouth daily. (Patient taking differently: Take 81 mg by mouth every other day.)    clopidogrel (PLAVIX) 75 mg tab Take 1 Tab by mouth daily. (Patient taking differently: Take 75 mg by mouth. EVERY 4 DAYS)    diphenhydrAMINE (BENADRYL) 25 mg capsule Take 25 mg by mouth every six (6) hours as needed. montelukast (SINGULAIR) 10 mg tablet Take 10 mg by mouth daily. albuterol (PROVENTIL VENTOLIN) 2.5 mg /3 mL (0.083 %) nebulizer solution 2.5 mg by Nebulization route every four (4) hours as needed for Wheezing. cyclobenzaprine (FLEXERIL) 10 mg tablet Take 10 mg by mouth three (3) times daily as needed for Muscle Spasm(s). zolpidem (AMBIEN) 10 mg tablet Take 5 mg by mouth nightly as needed for Sleep. Indications: Takes 5mg or 10mg as needed     No current facility-administered medications for this visit. Review of Symptoms:  11 systems reviewed, negative other than as stated in the HPI    Physical ExamPhysical Exam:    Vitals:    08/25/22 0958   BP: 102/70   Pulse: 74   Resp: 18   Temp: 98.2 °F (36.8 °C)   TempSrc: Temporal   SpO2: 96%   Weight: 184 lb (83.5 kg)   Height: 5' 2\" (1.575 m)       Body mass index is 33.65 kg/m². General PE   General:  Well developed, in no acute distress, cooperative and alert  HEENT: No carotid bruits, no JVD, trach is midline. Neck Supple, PEERL, EOM intact. Heart:  reg rate and rhythm; normal S1/S2; no murmurs, gallops or rubs. Respiratory: Clear bilaterally x 4, no wheezing or rales  Abdomen:   Soft, non-tender, no distention, no masses. + BS. Extremities:  Normal cap refill, no cyanosis, atraumatic. No edema. Neuro: A&Ox3, speech clear, gait stable. Skin: Skin color is normal. No rashes or lesions.  Non diaphoretic  Vascular: 2+ pulses symmetric in all extremities    Labs:   Lab Results   Component Value Date/Time    Cholesterol, total 158 11/08/2018 05:30 AM    HDL Cholesterol 63 11/08/2018 05:30 AM    LDL, calculated 73.2 11/08/2018 05:30 AM    Triglyceride 109 11/08/2018 05:30 AM    CHOL/HDL Ratio 2.5 11/08/2018 05:30 AM     Lab Results   Component Value Date/Time    CK 56 05/14/2018 10:41 AM     Lab Results   Component Value Date/Time    Sodium 139 11/07/2018 11:36 AM    Potassium 3.4 (L) 11/07/2018 11:36 AM    Chloride 102 11/07/2018 11:36 AM    CO2 25 11/07/2018 11:36 AM    Anion gap 12 11/07/2018 11:36 AM    Glucose 107 (H) 11/07/2018 11:36 AM    BUN 20 11/07/2018 11:36 AM    Creatinine 1.06 (H) 11/07/2018 11:36 AM    BUN/Creatinine ratio 19 11/07/2018 11:36 AM    GFR est AA >60 11/07/2018 11:36 AM    GFR est non-AA 52 (L) 11/07/2018 11:36 AM    Calcium 9.2 11/07/2018 11:36 AM    Bilirubin, total 0.3 11/07/2018 11:36 AM    Alk. phosphatase 86 11/07/2018 11:36 AM    Protein, total 8.0 11/07/2018 11:36 AM    Albumin 4.4 11/07/2018 11:36 AM    Globulin 3.6 11/07/2018 11:36 AM    A-G Ratio 1.2 11/07/2018 11:36 AM    ALT (SGPT) 31 11/07/2018 11:36 AM       EKG:       Assessment:     Assessment:        ICD-10-CM ICD-9-CM    1. Essential hypertension  I10 401.9       2. Atherosclerosis of native coronary artery of native heart with angina pectoris ([facility] [address]  I25.119 414.01      413.9       3. Dyslipidemia  E78.5 272.4       4. Palpitations  R00.2 785.1       5. HATCH (dyspnea on exertion)  R06.00 786.09       6. Abnormal CT of the chest  R93.89 793.2       7. Agatston CAC score, <100  R93.1 793.2           No orders of the defined types were placed in this encounter. 04/11/22    ECHO ADULT COMPLETE 04/13/2022 4/13/2022    Interpretation Summary    Left Ventricle: Left ventricle size is normal. Mild basal septal thickening. Normal wall motion. Normal left ventricular systolic function with a visually estimated EF of 55 - 60%.  Normal diastolic function. Signed by: John Lindquist MD on 4/13/2022  9:03 AM    05/06/22    NUCLEAR CARDIAC STRESS TEST 05/06/2022, 05/06/2022 5/6/2022    Interpretation Summary    ECG: Resting ECG demonstrates normal sinus rhythm. Stress Test: A Jose Maria protocol stress test was performed. Overall, the patient's exercise capacity was below average for their age. The patient exercised for 2 min and 6 sec. Speed/angle adjusted on the treadmill after 1:49 r/t pt dyspnea per NP. Blood pressure demonstrated a normal response and heart rate demonstrated a normal response to stress. The patient's heart rate recovery was normal.    Non-diagnostic stress test.  Nuclear images to follow. TWI inferior leads once standing. Became very dyspneic very quickly. INDICATION: Chest pain, hypertension. COMPARISON:  None. CORRELATIVE IMAGING STUDIES:  None    TRACER: Tc 99m Sestamibi    TECHNIQUE:  Resting SPECT images of the heart were obtained following the uneventful intravenous administration of 10.8 mCi of Tc 99m Sestamibi. Gated stress SPECT images of the heart were obtained following Jose Maria exercise protocol and the uneventful intravenous administration of 31.8 mCi of Tc 99m Sestamibi. FINDINGS:  The rest and stress perfusion images demonstrate no significant perfusion defect or evidence of myocardial reversibility. The gated images demonstrate normal global and regional wall motion and thickening. Left ventricular ejection fraction is 87 %. Impression:  Normal gated rest/stress myocardial perfusion imaging study. No evidence of myocardial ischemia or infarction. Left ventricular ejection fraction is 87 %. Signed by: Becky Cooper MD on 5/6/2022 12:52 PM, Signed by: Franca Sunshine MD on 5/6/2022  2:06 PM      CT Results (most recent):  Results from [place] encounter on 08/17/22    CT HEART W/O CONT WITH CALCIUM    Narrative  EXAM: CT HEART W/O CONT WITH CALCIUM    INDICATION: cv risk. Essential (primary) hypertension    COMPARISON: None. TECHNIQUE: Unenhanced multislice helical CT of the heart was performed on a  64-slice multiple detector row CT system ("[employer] Quantitative coronary artery  CT calcium scoring was performed using [employer] software. No intravenous contrast was  administered. CT dose reduction was achieved through use of a standardized  protocol tailored for this examination and automatic exposure control for dose  modulation. FINDINGS:  The coronary calcium in each vessel is as follows:    Left main coronary artery: 0  Left anterior descending coronary artery: 25  Left circumflex coronary artery: 7  Right coronary artery: 0  Posterior descending coronary artery: 0  Ramus intermedius: 22    Total calcium score: 54    Calcium score interpretation:  0-0 = No evidence of CAD  1-10 = Minimal evidence of CAD   = Mild evidence of CAD  101-400 = Moderate evidence of CAD  >400 = Extensive evidence of CAD    A score of 54 is at the 40th percentile rank. Therefore, 60% of the females aged  71-75 will have a higher calcium score. Chest CT findings:  No significant abnormality in the incidentally imaged lower  lungs. The entire lung fields are not imaged on this examination. No evidence of  mass or lymphadenopathy. The incidentally imaged portions of the upper abdominal  organs are within normal limits on this unenhanced examination. Degenerative  changes are seen in the visualized thoracic spine. Impression  Total calcium score of 54. This indicates mild evidence of plaque making mild or  minimal coronary stenosis likely. The result should be discussed with the  patient taking into account other risk factors such as age, gender, family  history, diabetes, smoking or high cholesterol levels. Plan:       Agatston CAC score of 54 per CT heart scan 8/17/2022  HATCH/precordial pain  Normal EF 55-60%  valves ok per echo in 5/2022.    Echo 5/23/18 with LVEF 55-60, grade I diastolic, valves ok  NST in 5/2022 Normal gated rest/stress myocardial perfusion imaging study. No evidence of myocardial ischemia or infarction. Left ventricular ejection fraction is 87 %. Stress MPI 6/4/18 Jose Maria treadmill 3:51, , chest pain prior and throughout test with no changes, no ischemic ST changes,  normal perfusion/no ischemia, LVEF 70  Recall Stress MPI 2011 abnormal with subsequent cardiac cath 2011 neg for CAD--[facility]  On ASA/Plavix, BB, CCB    Chest CT at [facility] 3/31/2022: Incidental finding:  Regions of fatty metaplasia involving the myocardium consistent with sequelae of antecedent ischemic event-infarction    HTN  Controlled with current therapy  Serum Cr 1.2 in 1/2022    HLD  Now taking red yeast rice  1/2022 , triglycerides 281, HDL 55 on labs faxed 3/31/2022 (exact date of labs is not apparent but suspected to be around that time), and noted statin intolerance with Crestor causing \"pain in the liver region,\" and she states also severe myalgias on 2 occasions with Crestor and previously with pravastatin. Labs and lipids per PCP    COPD  On inhalers  Now followed by Dr Izzy Leavitt    Hx TIA in 9/2018  Carotid artery disease Duplex in 2018  1.  16-49% stenosis in the right ICA. 2.  16-49% stenosis in the left ICA. On ASA/Plavix, statin intolerant    Patient was made aware during visit today that all testing completed would be instantaneously available on their MyChart for review. Discussed that these results will be made available to the provider at the same time. They were advised to wait at least 3 business days to allow for provider's interpretation of results with follow-up before calling our office with concerns about their results.     Continue current care and f/u 6 mos     Julio Elizabeth NP

## 2022-08-25 ENCOUNTER — OFFICE VISIT (OUTPATIENT)
Dept: CARDIOLOGY CLINIC | Age: 72
End: 2022-08-25
Payer: MEDICARE

## 2022-08-25 VITALS
HEIGHT: 62 IN | SYSTOLIC BLOOD PRESSURE: 102 MMHG | HEART RATE: 74 BPM | BODY MASS INDEX: 33.86 KG/M2 | RESPIRATION RATE: 18 BRPM | TEMPERATURE: 98.2 F | DIASTOLIC BLOOD PRESSURE: 70 MMHG | OXYGEN SATURATION: 96 % | WEIGHT: 184 LBS

## 2022-08-25 DIAGNOSIS — I25.119 ATHEROSCLEROSIS OF NATIVE CORONARY ARTERY OF NATIVE HEART WITH ANGINA PECTORIS (HCC): ICD-10-CM

## 2022-08-25 DIAGNOSIS — R00.2 PALPITATIONS: ICD-10-CM

## 2022-08-25 DIAGNOSIS — R93.89 ABNORMAL CT OF THE CHEST: ICD-10-CM

## 2022-08-25 DIAGNOSIS — I10 ESSENTIAL HYPERTENSION: Primary | ICD-10-CM

## 2022-08-25 DIAGNOSIS — R06.09 DOE (DYSPNEA ON EXERTION): ICD-10-CM

## 2022-08-25 DIAGNOSIS — R93.1 AGATSTON CAC SCORE, <100: ICD-10-CM

## 2022-08-25 DIAGNOSIS — E78.5 DYSLIPIDEMIA: ICD-10-CM

## 2022-08-25 PROCEDURE — G8536 NO DOC ELDER MAL SCRN: HCPCS | Performed by: NURSE PRACTITIONER

## 2022-08-25 PROCEDURE — 1101F PT FALLS ASSESS-DOCD LE1/YR: CPT | Performed by: NURSE PRACTITIONER

## 2022-08-25 PROCEDURE — 1090F PRES/ABSN URINE INCON ASSESS: CPT | Performed by: NURSE PRACTITIONER

## 2022-08-25 PROCEDURE — 99214 OFFICE O/P EST MOD 30 MIN: CPT | Performed by: NURSE PRACTITIONER

## 2022-08-25 PROCEDURE — G8432 DEP SCR NOT DOC, RNG: HCPCS | Performed by: NURSE PRACTITIONER

## 2022-08-25 PROCEDURE — 3017F COLORECTAL CA SCREEN DOC REV: CPT | Performed by: NURSE PRACTITIONER

## 2022-08-25 PROCEDURE — G8754 DIAS BP LESS 90: HCPCS | Performed by: NURSE PRACTITIONER

## 2022-08-25 PROCEDURE — G8417 CALC BMI ABV UP PARAM F/U: HCPCS | Performed by: NURSE PRACTITIONER

## 2022-08-25 PROCEDURE — G9899 SCRN MAM PERF RSLTS DOC: HCPCS | Performed by: NURSE PRACTITIONER

## 2022-08-25 PROCEDURE — G8399 PT W/DXA RESULTS DOCUMENT: HCPCS | Performed by: NURSE PRACTITIONER

## 2022-08-25 PROCEDURE — G8752 SYS BP LESS 140: HCPCS | Performed by: NURSE PRACTITIONER

## 2022-08-25 PROCEDURE — G8427 DOCREV CUR MEDS BY ELIG CLIN: HCPCS | Performed by: NURSE PRACTITIONER

## 2022-08-25 PROCEDURE — 1123F ACP DISCUSS/DSCN MKR DOCD: CPT | Performed by: NURSE PRACTITIONER

## 2022-08-25 NOTE — PROGRESS NOTES
Identified pt with two pt identifiers(name and ). Reviewed record in preparation for visit and have obtained necessary documentation. Chief Complaint   Patient presents with    Results     1 month follow up post Coronary calcium score. cardiax sx's (\"intermittent chest pains, Heart Rate increases to 150's w/ activity per fit bit. \")    phentermine D/C'd 3 weeks ago due to elevated heart rate per pcp. Prednisone therapy ended 22. Vitals:    22 0958   BP: 102/70   Pulse: 74   Resp: 18   Temp: 98.2 °F (36.8 °C)   TempSrc: Temporal   SpO2: 96%   Weight: 184 lb (83.5 kg)   Height: 5' 2\" (1.575 m)   PainSc:   0 - No pain       Medications reviewed/approved by provider. Health Maintenance Review: Patient reminded of \"due or due soon\" health maintenance. I have asked the patient to contact his/her primary care provider (PCP) for follow-up on his/her health maintenance. Coordination of Care Questionnaire:  :   1) Have you been to an emergency room, urgent care, or hospitalized since your last visit? If yes, where when, and reason for visit? no       2. Have seen or consulted any other health care provider since your last visit? If yes, where when, and reason for visit? Yes [facility]. Patient is accompanied by self I have received verbal consent from Cesar Brown to discuss any/all medical information while they are present in the room.

## 2022-09-29 ENCOUNTER — HOSPITAL ENCOUNTER (EMERGENCY)
Age: 72
Discharge: HOME | End: 2022-09-30
Attending: EMERGENCY MEDICINE | Admitting: EMERGENCY MEDICINE
Payer: MEDICARE

## 2022-09-29 VITALS
BODY MASS INDEX: 33.84 KG/M2 | SYSTOLIC BLOOD PRESSURE: 169 MMHG | OXYGEN SATURATION: 98 % | HEART RATE: 80 BPM | TEMPERATURE: 99.2 F | DIASTOLIC BLOOD PRESSURE: 78 MMHG | RESPIRATION RATE: 18 BRPM | WEIGHT: 185 LBS

## 2022-09-29 DIAGNOSIS — J18.9 PNEUMONIA OF BOTH LUNGS DUE TO INFECTIOUS ORGANISM, UNSPECIFIED PART OF LUNG: Primary | ICD-10-CM

## 2022-09-29 DIAGNOSIS — S23.41XA SPRAIN OF COSTAL CARTILAGE, INITIAL ENCOUNTER: ICD-10-CM

## 2022-09-29 PROCEDURE — 99285 EMERGENCY DEPT VISIT HI MDM: CPT | Performed by: EMERGENCY MEDICINE

## 2022-09-29 PROCEDURE — 85025 COMPLETE CBC W/AUTO DIFF WBC: CPT

## 2022-09-29 PROCEDURE — 96374 THER/PROPH/DIAG INJ IV PUSH: CPT | Performed by: EMERGENCY MEDICINE

## 2022-09-29 PROCEDURE — 93005 ELECTROCARDIOGRAM TRACING: CPT

## 2022-09-29 PROCEDURE — 36415 COLL VENOUS BLD VENIPUNCTURE: CPT

## 2022-09-29 PROCEDURE — 84484 ASSAY OF TROPONIN QUANT: CPT

## 2022-09-29 PROCEDURE — 80053 COMPREHEN METABOLIC PANEL: CPT

## 2022-09-29 RX ORDER — DIAZEPAM 5 MG/1
5 TABLET ORAL
Status: COMPLETED | OUTPATIENT
Start: 2022-09-29 | End: 2022-09-30

## 2022-09-29 RX ORDER — FENTANYL CITRATE 50 UG/ML
50 INJECTION, SOLUTION INTRAMUSCULAR; INTRAVENOUS
Status: DISCONTINUED | OUTPATIENT
Start: 2022-09-29 | End: 2022-09-29

## 2022-09-29 RX ORDER — ONDANSETRON 2 MG/ML
4 INJECTION INTRAMUSCULAR; INTRAVENOUS
Status: DISCONTINUED | OUTPATIENT
Start: 2022-09-29 | End: 2022-09-29

## 2022-09-30 ENCOUNTER — APPOINTMENT (OUTPATIENT)
Dept: GENERAL RADIOLOGY | Age: 72
End: 2022-09-30
Attending: EMERGENCY MEDICINE
Payer: MEDICARE

## 2022-09-30 LAB
ALBUMIN SERPL-MCNC: 3.8 G/DL (ref 3.5–5)
ALBUMIN/GLOB SERPL: 1.3 {RATIO} (ref 1.1–2.2)
ALP SERPL-CCNC: 108 U/L (ref 45–117)
ALT SERPL-CCNC: 30 U/L (ref 12–78)
ANION GAP SERPL CALC-SCNC: 8 MMOL/L (ref 5–15)
AST SERPL-CCNC: 23 U/L (ref 15–37)
BASOPHILS # BLD: 0.1 K/UL (ref 0–0.1)
BASOPHILS NFR BLD: 1 % (ref 0–1)
BILIRUB SERPL-MCNC: 0.3 MG/DL (ref 0.2–1)
BUN SERPL-MCNC: 28 MG/DL (ref 6–20)
BUN/CREAT SERPL: 20 (ref 12–20)
CALCIUM SERPL-MCNC: 9.5 MG/DL (ref 8.5–10.1)
CHLORIDE SERPL-SCNC: 101 MMOL/L (ref 97–108)
CO2 SERPL-SCNC: 28 MMOL/L (ref 21–32)
CREAT SERPL-MCNC: 1.4 MG/DL (ref 0.55–1.02)
DIFFERENTIAL METHOD BLD: NORMAL
EOSINOPHIL # BLD: 0.3 K/UL (ref 0–0.4)
EOSINOPHIL NFR BLD: 4 % (ref 0–7)
ERYTHROCYTE [DISTWIDTH] IN BLOOD BY AUTOMATED COUNT: 13.3 % (ref 11.5–14.5)
GLOBULIN SER CALC-MCNC: 2.9 G/DL (ref 2–4)
GLUCOSE SERPL-MCNC: 112 MG/DL (ref 65–100)
HCT VFR BLD AUTO: 41.7 % (ref 35–47)
HGB BLD-MCNC: 14.2 G/DL (ref 11.5–16)
IMM GRANULOCYTES # BLD AUTO: 0 K/UL (ref 0–0.04)
IMM GRANULOCYTES NFR BLD AUTO: 0 % (ref 0–0.5)
LYMPHOCYTES # BLD: 1.3 K/UL (ref 0.8–3.5)
LYMPHOCYTES NFR BLD: 19 % (ref 12–49)
MCH RBC QN AUTO: 32.2 PG (ref 26–34)
MCHC RBC AUTO-ENTMCNC: 34.1 G/DL (ref 30–36.5)
MCV RBC AUTO: 94.6 FL (ref 80–99)
MONOCYTES # BLD: 0.8 K/UL (ref 0–1)
MONOCYTES NFR BLD: 11 % (ref 5–13)
NEUTS SEG # BLD: 4.4 K/UL (ref 1.8–8)
NEUTS SEG NFR BLD: 65 % (ref 32–75)
NRBC # BLD: 0 K/UL (ref 0–0.01)
NRBC BLD-RTO: 0 PER 100 WBC
PLATELET # BLD AUTO: 233 K/UL (ref 150–400)
PMV BLD AUTO: 9.2 FL (ref 8.9–12.9)
POTASSIUM SERPL-SCNC: 4.2 MMOL/L (ref 3.5–5.1)
PROT SERPL-MCNC: 6.7 G/DL (ref 6.4–8.2)
RBC # BLD AUTO: 4.41 M/UL (ref 3.8–5.2)
SARS-COV-2, XPLCVT: NOT DETECTED
SODIUM SERPL-SCNC: 137 MMOL/L (ref 136–145)
SOURCE, COVRS: NORMAL
TROPONIN-HIGH SENSITIVITY: 9 NG/L (ref 0–51)
WBC # BLD AUTO: 6.8 K/UL (ref 3.6–11)

## 2022-09-30 PROCEDURE — 74011000270 HC RX REV CODE- 270: Performed by: EMERGENCY MEDICINE

## 2022-09-30 PROCEDURE — 96374 THER/PROPH/DIAG INJ IV PUSH: CPT | Performed by: EMERGENCY MEDICINE

## 2022-09-30 PROCEDURE — 74011250637 HC RX REV CODE- 250/637: Performed by: EMERGENCY MEDICINE

## 2022-09-30 PROCEDURE — 71045 X-RAY EXAM CHEST 1 VIEW: CPT

## 2022-09-30 PROCEDURE — 74011250636 HC RX REV CODE- 250/636: Performed by: EMERGENCY MEDICINE

## 2022-09-30 PROCEDURE — 74011000250 HC RX REV CODE- 250: Performed by: EMERGENCY MEDICINE

## 2022-09-30 PROCEDURE — U0005 INFEC AGEN DETEC AMPLI PROBE: HCPCS

## 2022-09-30 RX ORDER — LIDOCAINE 4 G/100G
1 PATCH TOPICAL
Status: DISCONTINUED | OUTPATIENT
Start: 2022-09-30 | End: 2022-09-30 | Stop reason: HOSPADM

## 2022-09-30 RX ORDER — DEXAMETHASONE SODIUM PHOSPHATE 10 MG/ML
10 INJECTION INTRAMUSCULAR; INTRAVENOUS
Status: COMPLETED | OUTPATIENT
Start: 2022-09-30 | End: 2022-09-30

## 2022-09-30 RX ORDER — HYDROCODONE POLISTIREX AND CHLORPHENIRAMINE POLISTIREX 10; 8 MG/5ML; MG/5ML
5 SUSPENSION, EXTENDED RELEASE ORAL
Status: COMPLETED | OUTPATIENT
Start: 2022-09-30 | End: 2022-09-30

## 2022-09-30 RX ORDER — DOXYCYCLINE 100 MG/1
100 CAPSULE ORAL
Status: COMPLETED | OUTPATIENT
Start: 2022-09-30 | End: 2022-09-30

## 2022-09-30 RX ORDER — DIAZEPAM 5 MG/1
5 TABLET ORAL
Qty: 20 TABLET | Refills: 0 | Status: SHIPPED | OUTPATIENT
Start: 2022-09-30

## 2022-09-30 RX ORDER — BENZONATATE 100 MG/1
100 CAPSULE ORAL
Qty: 30 CAPSULE | Refills: 0 | Status: SHIPPED | OUTPATIENT
Start: 2022-09-30 | End: 2022-10-07

## 2022-09-30 RX ORDER — DOXYCYCLINE HYCLATE 100 MG
100 TABLET ORAL 2 TIMES DAILY
Qty: 14 TABLET | Refills: 0 | Status: SHIPPED | OUTPATIENT
Start: 2022-09-30 | End: 2022-10-07

## 2022-09-30 RX ORDER — NEBULIZER AND COMPRESSOR
1 EACH MISCELLANEOUS
Qty: 1 EACH | Refills: 0 | Status: SHIPPED | OUTPATIENT
Start: 2022-09-30

## 2022-09-30 RX ORDER — PROMETHAZINE HYDROCHLORIDE AND DEXTROMETHORPHAN HYDROBROMIDE 6.25; 15 MG/5ML; MG/5ML
5 SYRUP ORAL
Qty: 118 ML | Refills: 0 | Status: SHIPPED | OUTPATIENT
Start: 2022-09-30 | End: 2022-10-07

## 2022-09-30 RX ORDER — DEXAMETHASONE 2 MG/1
TABLET ORAL
Qty: 24 TABLET | Refills: 0 | Status: SHIPPED | OUTPATIENT
Start: 2022-09-30

## 2022-09-30 RX ADMIN — DOXYCYCLINE 100 MG: 100 CAPSULE ORAL at 00:50

## 2022-09-30 RX ADMIN — DEXAMETHASONE SODIUM PHOSPHATE 10 MG: 10 INJECTION, SOLUTION INTRAMUSCULAR; INTRAVENOUS at 00:50

## 2022-09-30 RX ADMIN — Medication: at 00:58

## 2022-09-30 RX ADMIN — DIAZEPAM 5 MG: 5 TABLET ORAL at 00:11

## 2022-09-30 RX ADMIN — HYDROCODONE POLISTIREX AND CHLORPHENIRAMINE POLISTIREX 5 ML: 10; 8 SUSPENSION, EXTENDED RELEASE ORAL at 00:12

## 2022-09-30 NOTE — ED TRIAGE NOTES
Left flank/back pain that radiates under left breast. Patient states she was having a coughing spell and then the flank discomfort came. Pain 5/10.  Patient ambulatory at arrival.

## 2022-09-30 NOTE — ED PROVIDER NOTES
EMERGENCY DEPARTMENT HISTORY AND PHYSICAL EXAM      Date: 9/29/2022  Patient Name: Radha Iglesias    History of Presenting Illness     Chief Complaint   Patient presents with    Back Pain    Flank Pain    Shortness of Breath       History Provided By: Patient    HPI: Radha Iglesias, 71 y.o. female presents to the ED with cc of pain in the left side. Patient states she has a history of COPD. She states that recently she had been in [place] and had a COPD exacerbation there and just recently completed a round of steroids. She states tonight she had a coughing episode and was able to cough some mucus up and then began having pain up under her left shoulder blade radiating around to the front. She states that the pain feels like an intermittent spasm rated a 5 out of 10. She denies any other chest pain. She denies any fever or chills. She denies any current shortness of breath. She states that she had recently seen a pulmonologist and is due to see them next week for further testing and at that time she been placed on an inhaled steroid. She denies any abdominal pain, nausea, vomiting or diarrhea. She has no urinary symptoms. There is been no recent pain or swelling lower extremities. She states that she had taken some Delsym tonight when this occurred. 911 had been called out several hours ago and at that time she refused transport but the pain continued and she came in to be evaluated. There are no other complaints, changes, or physical findings at this time. PCP: Denny DIETRICH MD    No current facility-administered medications on file prior to encounter. Current Outpatient Medications on File Prior to Encounter   Medication Sig Dispense Refill    amLODIPine (NORVASC) 2.5 mg tablet TAKE 1 TABLET TWICE A  Tablet 0    Breztri Aerosphere 160-9-4.8 mcg/actuation HFAA Take 2 Puffs by inhalation two (2) times a day. cholecalciferol, vitamin D3, (VITAMIN D3 PO) Take  by mouth. metoprolol tartrate (LOPRESSOR) 25 mg tablet Take 0.5 Tablets by mouth two (2) times a day. Reduced dose 90 Tablet 3    synthroid 50 mcg tablet Take 50 mcg by mouth Daily (before breakfast). diclofenac (VOLTAREN) 1 % gel as needed. pilocarpine (SALAGEN) 5 mg tablet pilocarpine 5 mg tablet   Take 1 tablet 3 times a day by oral route. conjugated estrogens (Premarin) 0.625 mg/gram vaginal cream every Tuesday and Friday. potassium citrate (UROCIT-K10) 10 mEq (1,080 mg) TbER Take 2 Tablets by mouth two (2) times a day. aspirin delayed-release 81 mg tablet Take 1 Tab by mouth daily. (Patient taking differently: Take 81 mg by mouth every other day.) 30 Tab 0    clopidogrel (PLAVIX) 75 mg tab Take 1 Tab by mouth daily. (Patient taking differently: Take 75 mg by mouth. EVERY 4 DAYS) 30 Tab 0    diphenhydrAMINE (BENADRYL) 25 mg capsule Take 25 mg by mouth every six (6) hours as needed. montelukast (SINGULAIR) 10 mg tablet Take 10 mg by mouth daily. albuterol (PROVENTIL VENTOLIN) 2.5 mg /3 mL (0.083 %) nebulizer solution 2.5 mg by Nebulization route every four (4) hours as needed for Wheezing. cyclobenzaprine (FLEXERIL) 10 mg tablet Take 10 mg by mouth three (3) times daily as needed for Muscle Spasm(s). zolpidem (AMBIEN) 10 mg tablet Take 5 mg by mouth nightly as needed for Sleep.  Indications: Takes 5mg or 10mg as needed         Past History     Past Medical History:  Past Medical History:   Diagnosis Date    Arthritis     Asthma     Chronic obstructive pulmonary disease ([address]     Chronic pain     Dyslipidemia     Hypertension     Ill-defined condition     Kidney stones    Long term current use of anticoagulant therapy     ASA  & Plavix    Lumbar disc disease     Lumbar spondylosis     Menopause     Migraine headache     Stroke [address] [address] 10/2018    Thyroid disease        Past Surgical History:  Past Surgical History:   Procedure Laterality Date    HX APPENDECTOMY  1977    HX CERVICAL LAMINECTOMY  ,     HX GYN      [facility] HEART CATHETERIZATION      [facility]  Cardiology    HX HYSTERECTOMY      HX OOPHORECTOMY      HX ORTHOPAEDIC  ,     back surgery; wrist surgery     HX ORTHOPAEDIC      anterior cervical fusion C4-5    HX ORTHOPAEDIC  1984    cervical shaved C6       Family History:  Family History   Problem Relation Age of Onset    Stroke Mother     Hypertension Mother     Dementia Mother     High Cholesterol Mother     Heart Disease Father     Hypertension Father     Kidney Disease Father     High Cholesterol Father     Hypertension Sister     Stroke Sister         Sjogren's       Social History:  Social History     Tobacco Use    Smoking status: Former     Packs/day: 2.00     Years: 45.00     Pack years: 90.00     Types: Cigarettes     Quit date: 2013     Years since quittin.8    Smokeless tobacco: Never   Vaping Use    Vaping Use: Never used   Substance Use Topics    Alcohol use: Yes     Alcohol/week: 5.0 standard drinks     Types: 5 Glasses of wine per week     Comment: 5 glasses a week    Drug use: Not Currently       Allergies: Allergies   Allergen Reactions    Crestor [Rosuvastatin] Myalgia     \"Pain in the liver location. \"    Mold Other (comments)     Asthma symptoms    Pcn [Penicillins] Anaphylaxis    Ibuprofen Rash    Levofloxacin Nausea Only         Review of Systems   Review of Systems   Constitutional: Negative. Negative for appetite change, chills, fatigue and fever. HENT: Negative. Negative for congestion, rhinorrhea, sinus pressure and sore throat. Eyes: Negative. Respiratory:  Positive for cough. Negative for choking, chest tightness, shortness of breath and wheezing. Cardiovascular:  Positive for chest pain. Negative for palpitations and leg swelling. Gastrointestinal:  Negative for abdominal pain, constipation, diarrhea, nausea and vomiting. Endocrine: Negative. Genitourinary: Negative.   Negative for difficulty urinating, dysuria, flank pain and urgency. Musculoskeletal:  Positive for back pain. Skin: Negative. Neurological: Negative. Negative for dizziness, speech difficulty, weakness, light-headedness, numbness and headaches. Psychiatric/Behavioral: Negative. All other systems reviewed and are negative. Physical Exam   Physical Exam  Vitals and nursing note reviewed. Constitutional:       General: She is not in acute distress. Appearance: She is well-developed. She is obese. She is not diaphoretic. HENT:      Head: Normocephalic and atraumatic. Mouth/Throat:      Mouth: Mucous membranes are moist.      Pharynx: No oropharyngeal exudate. Eyes:      General: No scleral icterus. Extraocular Movements: Extraocular movements intact. Conjunctiva/sclera: Conjunctivae normal.      Pupils: Pupils are equal, round, and reactive to light. Neck:      Vascular: No JVD. Trachea: No tracheal deviation. Cardiovascular:      Rate and Rhythm: Normal rate and regular rhythm. Heart sounds: Normal heart sounds. No murmur heard. Pulmonary:      Effort: Pulmonary effort is normal. No respiratory distress. Breath sounds: Normal breath sounds. No stridor. No decreased breath sounds, wheezing, rhonchi or rales. Comments: Dry cough   Chest:      Chest wall: Tenderness present. Abdominal:      General: There is no distension. Palpations: Abdomen is soft. Tenderness: There is no abdominal tenderness. There is no guarding or rebound. Musculoskeletal:         General: Normal range of motion. Cervical back: Normal range of motion and neck supple. Right lower leg: No edema. Left lower leg: No edema. Skin:     General: Skin is warm and dry. Capillary Refill: Capillary refill takes less than 2 seconds. Neurological:      Mental Status: She is alert and oriented to person, place, and time. Cranial Nerves: No cranial nerve deficit.       Comments: No gross motor or sensory deficits    Psychiatric:         Mood and Affect: Mood normal.         Behavior: Behavior normal.       Diagnostic Study Results     Labs -     Recent Results (from the past 12 hour(s))   CBC WITH AUTOMATED DIFF    Collection Time: 09/29/22 11:55 PM   Result Value Ref Range    WBC 6.8 3.6 - 11.0 K/uL    RBC 4.41 3.80 - 5.20 M/uL    HGB 14.2 11.5 - 16.0 g/dL    HCT 41.7 35.0 - 47.0 %    MCV 94.6 80.0 - 99.0 FL    MCH 32.2 26.0 - 34.0 PG    MCHC 34.1 30.0 - 36.5 g/dL    RDW 13.3 11.5 - 14.5 %    PLATELET 447 177 - 008 K/uL    MPV 9.2 8.9 - 12.9 FL    NRBC 0.0 0  WBC    ABSOLUTE NRBC 0.00 0.00 - 0.01 K/uL    NEUTROPHILS 65 32 - 75 %    LYMPHOCYTES 19 12 - 49 %    MONOCYTES 11 5 - 13 %    EOSINOPHILS 4 0 - 7 %    BASOPHILS 1 0 - 1 %    IMMATURE GRANULOCYTES 0 0.0 - 0.5 %    ABS. NEUTROPHILS 4.4 1.8 - 8.0 K/UL    ABS. LYMPHOCYTES 1.3 0.8 - 3.5 K/UL    ABS. MONOCYTES 0.8 0.0 - 1.0 K/UL    ABS. EOSINOPHILS 0.3 0.0 - 0.4 K/UL    ABS. BASOPHILS 0.1 0.0 - 0.1 K/UL    ABS. IMM. GRANS. 0.0 0.00 - 0.04 K/UL    DF AUTOMATED     METABOLIC PANEL, COMPREHENSIVE    Collection Time: 09/29/22 11:55 PM   Result Value Ref Range    Sodium 137 136 - 145 mmol/L    Potassium 4.2 3.5 - 5.1 mmol/L    Chloride 101 97 - 108 mmol/L    CO2 28 21 - 32 mmol/L    Anion gap 8 5 - 15 mmol/L    Glucose 112 (H) 65 - 100 mg/dL    BUN 28 (H) 6 - 20 MG/DL    Creatinine 1.40 (H) 0.55 - 1.02 MG/DL    BUN/Creatinine ratio 20 12 - 20      GFR est AA 45 (L) >60 ml/min/1.73m2    GFR est non-AA 37 (L) >60 ml/min/1.73m2    Calcium 9.5 8.5 - 10.1 MG/DL    Bilirubin, total 0.3 0.2 - 1.0 MG/DL    ALT (SGPT) 30 12 - 78 U/L    AST (SGOT) 23 15 - 37 U/L    Alk.  phosphatase 108 45 - 117 U/L    Protein, total 6.7 6.4 - 8.2 g/dL    Albumin 3.8 3.5 - 5.0 g/dL    Globulin 2.9 2.0 - 4.0 g/dL    A-G Ratio 1.3 1.1 - 2.2     TROPONIN-HIGH SENSITIVITY    Collection Time: 09/29/22 11:55 PM   Result Value Ref Range    Troponin-High Sensitivity 9 0 - 51 ng/L Radiologic Studies -   XR CHEST PORT   Final Result   Mild diffuse interstitial opacities can be seen with pulmonary edema   or infection, including atypical/viral radiology's. CT Results  (Last 48 hours)      None          CXR Results  (Last 48 hours)      None            Medical Decision Making   I am the first provider for this patient. I reviewed the vital signs, available nursing notes, past medical history, past surgical history, family history and social history. Vital Signs-Reviewed the patient's vital signs. Patient Vitals for the past 12 hrs:   Temp Pulse Resp BP SpO2   09/29/22 2344 99.2 °F (37.3 °C) 80 18 (!) 169/78 98 %       EKG interpretation: (Preliminary)  NSR, rate 77, leftward axis, normal pr/qrs, no acute ST changes, Kenrick Hoff,       Records Reviewed: Nursing Notes, Old Medical Records, Previous Radiology Studies, and Previous Laboratory Studies, Followed by Dr. Maximus Pulido, Prior cardiac CT mild plaque build up,     Provider Notes (Medical Decision Making):   DDx- Rib strain, rib fx, PTX, pneumonia       ED Course:   Initial assessment performed. The patients presenting problems have been discussed, and they are in agreement with the care plan formulated and outlined with them. I have encouraged them to ask questions as they arise throughout their visit. Pt has MDI at home, but no spacer. Had been albuterol nebulizer tx's but had been using a family member's machine and does not have one of her own. Discussed patient's discharge with her we will send off a COVID PCR. Discussed with her that her symptoms last week could have also been COVID at that time as she had had a negative rapid only. And this could be a reoccurrence syndrome. Would not recommend packs of leg given her other medications that she takes on a daily basis as the risk would outweigh benefit.   Patient had completed a 5-day course of azithromycin from [place] a couple days ago given that she has bilateral interstitial infiltrates we will start the patient on doxycycline and dextran to Decadron. Patient has an albuterol MDI discussed spacer use however will prescribe nebulizer and compressor as she has vials of albuterol at home. Patient states symptomatic relief of her pain post Valium. Discussed she can also use lidocaine patches over the area on 12 hours off 12 hours. Patient's not in any respiratory distress with oxygen saturations of 98% on room air. We will plan for discharge home. Disposition:    DC- Adult Discharges: All of the diagnostic tests were reviewed and questions answered. Diagnosis, care plan and treatment options were discussed. The patient understands the instructions and will follow up as directed. The patients results have been reviewed with them. They have been counseled regarding their diagnosis. The patient and spouse/SO verbally convey understanding and agreement of the signs, symptoms, diagnosis, treatment and prognosis and additionally agrees to follow up as recommended with their PCP in 24 - 48 hours. They also agree with the care-plan and convey that all of their questions have been answered. I have also put together some discharge instructions for them that include: 1) educational information regarding their diagnosis, 2) how to care for their diagnosis at home, as well a 3) list of reasons why they would want to return to the ED prior to their follow-up appointment, should their condition change. DISCHARGE PLAN:  1. Discharge Medication List as of 9/30/2022  1:19 AM        START taking these medications    Details   doxycycline (VIBRA-TABS) 100 mg tablet Take 1 Tablet by mouth two (2) times a day for 7 days. , Normal, Disp-14 Tablet, R-0      dexAMETHasone (DECADRON) 2 mg tablet Take 10mg on day 1, then 8mg on day 2/3, then 6mg on day 4/5, then 4mg on day 6/7, then 2mg x 1, Normal, Disp-24 Tablet, R-0      promethazine-dextromethorphan (PROMETHAZINE-DM) 6.25-15 mg/5 mL syrup Take 5 mL by mouth every four (4) hours as needed for Cough for up to 7 days. , Normal, Disp-118 mL, R-0      Nebulizer & Compressor machine 1 Each by Does Not Apply route four (4) times daily as needed for Wheezing or Shortness of Breath., Print, Disp-1 Each, R-0      benzonatate (Tessalon Perles) 100 mg capsule Take 1 Capsule by mouth three (3) times daily as needed for Cough for up to 7 days. , Normal, Disp-30 Capsule, R-0      diazePAM (Valium) 5 mg tablet Take 1 Tablet by mouth three (3) times daily as needed (spasm). Max Daily Amount: 15 mg., Normal, Disp-20 Tablet, R-0           CONTINUE these medications which have NOT CHANGED    Details   amLODIPine (NORVASC) 2.5 mg tablet TAKE 1 TABLET TWICE A DAY, Normal, Disp-180 Tablet, R-0      Breztri Aerosphere 160-9-4.8 mcg/actuation HFAA Take 2 Puffs by inhalation two (2) times a day., Historical Med, SANCHEZ      cholecalciferol, vitamin D3, (VITAMIN D3 PO) Take  by mouth., Historical Med      metoprolol tartrate (LOPRESSOR) 25 mg tablet Take 0.5 Tablets by mouth two (2) times a day. Reduced dose, Normal, Disp-90 Tablet, R-3      synthroid 50 mcg tablet Take 50 mcg by mouth Daily (before breakfast). , Historical Med, SANCHEZ      diclofenac (VOLTAREN) 1 % gel as needed., Historical Med      pilocarpine (SALAGEN) 5 mg tablet pilocarpine 5 mg tablet   Take 1 tablet 3 times a day by oral route., Historical Med      conjugated estrogens (Premarin) 0.625 mg/gram vaginal cream every Tuesday and Friday., Historical Med      potassium citrate (UROCIT-K10) 10 mEq (1,080 mg) TbER Take 2 Tablets by mouth two (2) times a day., Historical Med      aspirin delayed-release 81 mg tablet Take 1 Tab by mouth daily. , Print, Disp-30 Tab, R-0      clopidogrel (PLAVIX) 75 mg tab Take 1 Tab by mouth daily. , Print, Disp-30 Tab, R-0      diphenhydrAMINE (BENADRYL) 25 mg capsule Take 25 mg by mouth every six (6) hours as needed., Historical Med      montelukast (SINGULAIR) 10 mg tablet Take 10 mg by mouth daily. , Historical Med      albuterol (PROVENTIL VENTOLIN) 2.5 mg /3 mL (0.083 %) nebulizer solution 2.5 mg by Nebulization route every four (4) hours as needed for Wheezing., Historical Med      cyclobenzaprine (FLEXERIL) 10 mg tablet Take 10 mg by mouth three (3) times daily as needed for Muscle Spasm(s). , Historical Med      zolpidem (AMBIEN) 10 mg tablet Take 5 mg by mouth nightly as needed for Sleep. Indications: Takes 5mg or 10mg as needed, Historical Med           2. Follow-up Information       Follow up With Specialties Details Why Contact Info    Antoinette Ortiz MD Internal Medicine Physician  As needed [address] [id number]  [phone]            3. Return to ED if worse     Diagnosis     Clinical Impression:   1. Pneumonia of both lungs due to infectious organism, unspecified part of lung    2. Sprain of costal cartilage, initial encounter        Attestations:    Jesica Klein, DO        Please note that this dictation was completed with [employer], the [employer] voice recognition software. Quite often unanticipated grammatical, syntax, homophones, and other interpretive errors are inadvertently transcribed by the computer software. Please disregard these errors. Please excuse any errors that have escaped final proofreading. Thank you.

## 2022-10-03 LAB
ATRIAL RATE: 77 BPM
CALCULATED P AXIS, ECG09: 1 DEGREES
CALCULATED R AXIS, ECG10: -5 DEGREES
CALCULATED T AXIS, ECG11: 18 DEGREES
DIAGNOSIS, 93000: NORMAL
P-R INTERVAL, ECG05: 166 MS
Q-T INTERVAL, ECG07: 376 MS
QRS DURATION, ECG06: 76 MS
QTC CALCULATION (BEZET), ECG08: 425 MS
VENTRICULAR RATE, ECG03: 77 BPM

## 2022-10-06 ENCOUNTER — HOSPITAL ENCOUNTER (OUTPATIENT)
Dept: CT IMAGING | Age: 72
Discharge: HOME | End: 2022-10-06
Attending: INTERNAL MEDICINE
Payer: MEDICARE

## 2022-10-06 DIAGNOSIS — J18.9 PNEUMONIA: ICD-10-CM

## 2022-10-06 DIAGNOSIS — R05.3 CHRONIC COUGH: ICD-10-CM

## 2022-10-06 PROCEDURE — 71250 CT THORAX DX C-: CPT

## 2022-10-25 ENCOUNTER — TRANSCRIBE ORDER (OUTPATIENT)
Dept: REGISTRATION | Age: 72
End: 2022-10-25

## 2022-10-25 ENCOUNTER — HOSPITAL ENCOUNTER (OUTPATIENT)
Dept: GENERAL RADIOLOGY | Age: 72
Discharge: HOME | End: 2022-10-25
Payer: MEDICARE

## 2022-10-25 ENCOUNTER — TRANSCRIBE ORDER (OUTPATIENT)
Dept: SCHEDULING | Age: 72
End: 2022-10-25

## 2022-10-25 DIAGNOSIS — M79.604 PAIN IN RIGHT LEG: ICD-10-CM

## 2022-10-25 DIAGNOSIS — M25.511 RIGHT SHOULDER PAIN: Primary | ICD-10-CM

## 2022-10-25 DIAGNOSIS — M79.605 PAIN IN LEFT LEG: Primary | ICD-10-CM

## 2022-10-25 DIAGNOSIS — M25.562 LEFT KNEE PAIN: ICD-10-CM

## 2022-10-25 DIAGNOSIS — M79.604 RIGHT LEG PAIN: ICD-10-CM

## 2022-10-25 DIAGNOSIS — M25.552 LEFT HIP PAIN: ICD-10-CM

## 2022-10-25 DIAGNOSIS — M79.605 LEFT LEG PAIN: ICD-10-CM

## 2022-10-25 DIAGNOSIS — M25.552 LEFT HIP PAIN: Primary | ICD-10-CM

## 2022-10-25 DIAGNOSIS — M79.604 LOWER EXTREMITY PAIN, BILATERAL: Primary | ICD-10-CM

## 2022-10-25 DIAGNOSIS — M25.511 RIGHT SHOULDER PAIN: ICD-10-CM

## 2022-10-25 DIAGNOSIS — M25.561 RIGHT KNEE PAIN: ICD-10-CM

## 2022-10-25 DIAGNOSIS — M25.551 RIGHT HIP PAIN: ICD-10-CM

## 2022-10-25 DIAGNOSIS — M79.605 LOWER EXTREMITY PAIN, BILATERAL: Primary | ICD-10-CM

## 2022-10-25 PROCEDURE — 73522 X-RAY EXAM HIPS BI 3-4 VIEWS: CPT

## 2022-10-25 PROCEDURE — 73590 X-RAY EXAM OF LOWER LEG: CPT

## 2022-10-25 PROCEDURE — 73564 X-RAY EXAM KNEE 4 OR MORE: CPT

## 2022-10-25 PROCEDURE — 73030 X-RAY EXAM OF SHOULDER: CPT

## 2022-10-28 ENCOUNTER — HOSPITAL ENCOUNTER (OUTPATIENT)
Dept: ULTRASOUND IMAGING | Age: 72
Discharge: HOME | End: 2022-10-28
Attending: INTERNAL MEDICINE
Payer: MEDICARE

## 2022-10-28 DIAGNOSIS — M79.605 LOWER EXTREMITY PAIN, BILATERAL: ICD-10-CM

## 2022-10-28 DIAGNOSIS — M79.604 LOWER EXTREMITY PAIN, BILATERAL: ICD-10-CM

## 2022-10-28 PROCEDURE — 93970 EXTREMITY STUDY: CPT

## 2022-11-03 RX ORDER — AMLODIPINE BESYLATE 2.5 MG/1
TABLET ORAL
Qty: 180 TABLET | Refills: 1 | Status: SHIPPED | OUTPATIENT
Start: 2022-11-03

## 2022-11-03 NOTE — TELEPHONE ENCOUNTER
PCP: Denny DIETRICH MD    Last appt: 8/25/2022  Future Appointments   Date Time Provider Travis Claudio   3/1/2023  9:20 AM Gonzalez Alcantar, NP [facility]       Requested Prescriptions     Signed Prescriptions Disp Refills    amLODIPine (NORVASC) 2.5 mg tablet 180 Tablet 1     Sig: TAKE 1 TABLET TWICE A DAY     Authorizing Provider: Cornelio Maxwell     Ordering User: GRAHAM BAUMAN         Other Comments:  Verbal order per provider. Order (medication, dose, route, frequency, amount, refills) repeated and verified twice.

## 2022-11-18 ENCOUNTER — TRANSCRIBE ORDER (OUTPATIENT)
Dept: SCHEDULING | Age: 72
End: 2022-11-18

## 2022-11-18 DIAGNOSIS — M25.511 RIGHT SHOULDER PAIN: ICD-10-CM

## 2022-11-18 DIAGNOSIS — M75.21 BICIPITAL TENDINITIS OF RIGHT SHOULDER: Primary | ICD-10-CM

## 2022-11-23 ENCOUNTER — HOSPITAL ENCOUNTER (OUTPATIENT)
Dept: MRI IMAGING | Age: 72
Discharge: HOME | End: 2022-11-23
Attending: ORTHOPAEDIC SURGERY
Payer: MEDICARE

## 2022-11-23 DIAGNOSIS — M25.511 RIGHT SHOULDER PAIN: ICD-10-CM

## 2022-11-23 DIAGNOSIS — M75.21 BICIPITAL TENDINITIS OF RIGHT SHOULDER: ICD-10-CM

## 2022-11-23 PROCEDURE — 73221 MRI JOINT UPR EXTREM W/O DYE: CPT

## 2022-12-02 ENCOUNTER — OFFICE VISIT (OUTPATIENT)
Dept: ORTHOPEDIC SURGERY | Age: 72
End: 2022-12-02
Payer: MEDICARE

## 2022-12-02 VITALS — HEIGHT: 63 IN | WEIGHT: 187 LBS | BODY MASS INDEX: 33.13 KG/M2

## 2022-12-02 DIAGNOSIS — M75.41 IMPINGEMENT SYNDROME OF RIGHT SHOULDER: ICD-10-CM

## 2022-12-02 DIAGNOSIS — S46.011A TRAUMATIC COMPLETE TEAR OF RIGHT ROTATOR CUFF, INITIAL ENCOUNTER: Primary | ICD-10-CM

## 2022-12-02 DIAGNOSIS — M19.011 ARTHRITIS OF RIGHT ACROMIOCLAVICULAR JOINT: ICD-10-CM

## 2022-12-02 RX ORDER — FUROSEMIDE 20 MG/1
TABLET ORAL DAILY
COMMUNITY

## 2022-12-02 NOTE — PROGRESS NOTES
Leann Edge (: 1950) is a 71 y.o. female, established patient, here for evaluation of the following chief complaint(s):  Shoulder Pain       ASSESSMENT/PLAN:  Below is the assessment and plan developed based on review of pertinent history, physical exam, labs, studies, and medications. Findings were discussed with the patient and her  today. We do long discussion regarding appropriate treatment options including shoulder arthroplasty versus rotator cuff repair. This seems to be more of an acute traumatic event with significant rotator cuff retraction. She also has no glenohumeral chondral changes. Therefore I believe that rotator cuff repair would be best procedure for her. We discussed risks and benefits of right shoulder arthroscopy with rotator cuff repair, acromioplasty, and distal clavicle resection. Risks and benefits of surgical treatment were explained. We discussed risks of infection, blood loss, neurovascular injury, anesthesia risks, and risk secondary to patient comorbidities. Risk of continued shoulder pain/instability was explained. We discussed that there may be need for future surgical procedures. We discussed that implants may need to be used for this procedure. Patient understands the risks of this procedure and elects to schedule in the near future. 1. Traumatic complete tear of right rotator cuff, initial encounter  2. Impingement syndrome of right shoulder  3. Arthritis of right acromioclavicular joint    Return for surgery. SUBJECTIVE/OBJECTIVE:  Leann Edge (: 1950) is a 71 y.o. female. She notes aching pain in the right shoulder and significant weakness ongoing since she felt a pop in her shoulder at the end of September when she was lifting luggage. Since then she has had difficulty with daily activities and has to lift her shoulder with the opposite arm.   She notes that her shoulder was functioning relatively well prior to this injury. Ice, anti-inflammatories, and therapy exercises have provided no relief        Allergies   Allergen Reactions    Crestor [Rosuvastatin] Myalgia     \"Pain in the liver location. \"    Mold Other (comments)     Asthma symptoms    Pcn [Penicillins] Anaphylaxis    Ibuprofen Rash    Levofloxacin Nausea Only       Current Outpatient Medications   Medication Sig    furosemide (Lasix) 20 mg tablet Take  by mouth daily. amLODIPine (NORVASC) 2.5 mg tablet TAKE 1 TABLET TWICE A DAY    Nebulizer & Compressor machine 1 Each by Does Not Apply route four (4) times daily as needed for Wheezing or Shortness of Breath. Breztri Aerosphere 160-9-4.8 mcg/actuation HFAA Take 2 Puffs by inhalation two (2) times a day. cholecalciferol, vitamin D3, (VITAMIN D3 PO) Take  by mouth.    metoprolol tartrate (LOPRESSOR) 25 mg tablet Take 0.5 Tablets by mouth two (2) times a day. Reduced dose    synthroid 50 mcg tablet Take 50 mcg by mouth Daily (before breakfast). diclofenac (VOLTAREN) 1 % gel as needed. pilocarpine (SALAGEN) 5 mg tablet pilocarpine 5 mg tablet   Take 1 tablet 3 times a day by oral route. conjugated estrogens (Premarin) 0.625 mg/gram vaginal cream every Tuesday and Friday. potassium citrate (UROCIT-K10) 10 mEq (1,080 mg) TbER Take 2 Tablets by mouth two (2) times a day. aspirin delayed-release 81 mg tablet Take 1 Tab by mouth daily. (Patient taking differently: Take 81 mg by mouth every other day.)    clopidogrel (PLAVIX) 75 mg tab Take 1 Tab by mouth daily. (Patient taking differently: Take 75 mg by mouth. EVERY 4 DAYS)    diphenhydrAMINE (BENADRYL) 25 mg capsule Take 25 mg by mouth every six (6) hours as needed. montelukast (SINGULAIR) 10 mg tablet Take 10 mg by mouth daily. albuterol (PROVENTIL VENTOLIN) 2.5 mg /3 mL (0.083 %) nebulizer solution 2.5 mg by Nebulization route every four (4) hours as needed for Wheezing.     cyclobenzaprine (FLEXERIL) 10 mg tablet Take 10 mg by mouth three (3) times daily as needed for Muscle Spasm(s). zolpidem (AMBIEN) 10 mg tablet Take 5 mg by mouth nightly as needed for Sleep. Indications: Takes 5mg or 10mg as needed    dexAMETHasone (DECADRON) 2 mg tablet Take 10mg on day 1, then 8mg on day 2/3, then 6mg on day 4/5, then 4mg on day 6/7, then 2mg x 1 (Patient not taking: Reported on 2022)    diazePAM (Valium) 5 mg tablet Take 1 Tablet by mouth three (3) times daily as needed (spasm). Max Daily Amount: 15 mg. (Patient not taking: Reported on 2022)     No current facility-administered medications for this visit. Social History     Socioeconomic History    Marital status:      Spouse name: Not on file    Number of children: Not on file    Years of education: Not on file    Highest education level: Not on file   Occupational History    Not on file   Tobacco Use    Smoking status: Former     Packs/day: 2.00     Years: 45.00     Pack years: 90.00     Types: Cigarettes     Quit date: 2013     Years since quittin.9    Smokeless tobacco: Never   Vaping Use    Vaping Use: Never used   Substance and Sexual Activity    Alcohol use:  Yes     Alcohol/week: 5.0 standard drinks     Types: 5 Glasses of wine per week     Comment: 5 glasses a week    Drug use: Not Currently    Sexual activity: Yes     Partners: Male   Other Topics Concern    Not on file   Social History Narrative    Not on file     Social Determinants of Health     Financial Resource Strain: Not on file   Food Insecurity: Not on file   Transportation Needs: Not on file   Physical Activity: Not on file   Stress: Not on file   Social Connections: Not on file   Intimate Partner Violence: Not on file   Housing Stability: Not on file       Past Surgical History:   Procedure Laterality Date    HX APPENDECTOMY      [address] [facility], 2005    [address]      [facility]  Cardiology    HX HYSTERECTOMY      HX OOPHORECTOMY      HX ORTHOPAEDIC  , 2014    back surgery; wrist surgery     HX ORTHOPAEDIC      anterior cervical fusion C4-5    HX ORTHOPAEDIC  1984    cervical shaved C6       Family History   Problem Relation Age of Onset    Stroke Mother     Hypertension Mother     Dementia Mother     High Cholesterol Mother     Heart Disease Father     Hypertension Father     Kidney Disease Father     High Cholesterol Father     Hypertension Sister     Stroke Sister         Sjogren's        OB History          2    Para   2    Term   2            AB        Living             SAB        IAB        Ectopic        Molar        Multiple        Live Births   2                   REVIEW OF SYSTEMS:  ROS    Positive for: Musculoskeletal  Last edited by Vu Rodrigez on 2022  1:02 PM.        Patient denies any recent fever, chills, nausea, vomiting, chest pain, or shortness of breath. Vitals:  Ht 5' 2.5\" (1.588 m)   Wt 187 lb (84.8 kg)   BMI 33.66 kg/m²    Body mass index is 33.66 kg/m². PHYSICAL EXAM:  General exam: Patient is awake, alert, and oriented x3. Well-appearing. No acute distress. Ambulates with a normal gait. Heart/Lungs:  no respiratory distress, palpable pulses    Right shoulder: Neurovascular and sensory intact. There is tenderness to palpation at the anterior lateral shoulder. Limited passive range of motion is noted. There is limited active range of motion to 90 degrees of forward flexion and abduction. Pain is noted with impingement testing including Boyd exam.  Pain and weakness 4/5 is noted with rotator cuff strength testing including resisted abduction and resisted external rotation. Normal stability.   There is some tenderness palpation at the [facility] joint and pain with crossarm exam.        IMAGING:  MRI Results (most recent):  Results from Hospital Encounter encounter on 22    MRI SHOULDER RT WO CONT    Narrative  EXAM:  MRI SHOULDER RT WO CONT    INDICATION: Right shoulder pain and limited range of motion. COMPARISON: Right shoulder views on 10/25/2022    TECHNIQUE: Axial proton density fat-saturated; oblique coronal T1, T2  fat-saturated, and proton density fat-saturated; and oblique sagittal T2  fat-saturated MRI of the right shoulder. Examination presented for my  interpretation on 11/28/2022. CONTRAST: None. FINDINGS: Motion artifact obscures fine detail and limits sensitivity for  detecting pathology. A.C. joint: Moderate osteoarthritis. Multiple marginal osteophytes. Anterior  acromion process type: 2 with inferior osteophytes. Bone marrow: Superior subluxation of the humeral head, which abuts the  undersurface of the acromion process, new since last month. No acute fracture,  dislocation, or marrow replacing process. Joint fluid: Glenohumeral joint effusion extends into the subacromial/subdeltoid  bursa. No measurable loose body. Mild synovial thickening. Rotator cuff tendons: Complete rupture of the distal supraspinatus and  infraspinatus tendons. Supraspinatus tendon is retracted to the glenoid. Infraspinatus tendon is retracted 2-3 cm. Teres minor tendon is intact. Mild  subscapularis tendinosis and multifocal shallow partial-thickness tears. Biceps tendon: Thin but intact. No dislocation. Muscles: Mild diffuse atrophy. Mild edema in the infraspinatus muscle. Glenoid labrum: Nondisplaced degeneration. Joint capsule: within normal limits. Glenohumeral articular cartilage: Intact. No focal osteochondral lesion. Soft tissue mass: None. Impression  1. 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 Retraction to the  glenoid. 2. Acute versus subacute complete rupture of the infraspinatus tendon. 2-3 cm  retraction. Mild associated muscle strain. 3. Acromioclavicular osteophytes may cause subacromial impingement. 4. Rotator cuff arthropathy is new since last month, possibly due to different  patient positioning.       XR Results (most recent):  Results from [place] encounter on 10/25/22    XR SHOULDER RT AP/LAT MIN 2 V    Narrative  EXAM: XR SHOULDER RT AP/LAT MIN 2 V    INDICATION: .  Right shoulder pain    COMPARISON: None. FINDINGS: Three views of the right shoulder demonstrate no fracture, dislocation  or other acute abnormality. There is moderate degenerative change, particularly  at the acromioclavicular joint, with hypertrophic bone and joint space narrowing  with articular sclerosis. Impression  1. No acute bony abnormality. 2. Degenerative change greatest at the acromioclavicular joint. .      XR TIB/FIB BI 2 V    Narrative  Clinical indication: Left leg pain. Frontal and lateral view of the left tibia and fibula show some soft tissue  swelling but no significant bone findings. Impression  impression: No significant bone findings. XR KNEES BI MIN 4 V    Narrative  EXAM:  XR KNEES BI MIN 4 V    INDICATION:   Left knee pain    COMPARISON: None. FINDINGS: 4 view examinations of both knees, weightbearing, demonstrate no  fracture, effusion or other osseous, articular or soft tissue abnormality. Both knees show mild joint space narrowing and articular sclerosis. There is no  patellar subluxation. Impression  1. No acute bony abnormality of the knees bilaterally. 2. Mild degenerative change. No orders of the defined types were placed in this encounter. An electronic signature was used to authenticate this note.   -- Venora Massing, DO

## 2022-12-02 NOTE — LETTER
12/2/2022    Patient: Jac Reeves   YOB: 1950   Date of Visit: 12/2/2022     Gita Saldana MD  [facility] [phone]  Via Fax: [phone]    Dear Gita Saldana MD,      Thank you for referring Ms. Yemi Braden to [facility] for evaluation. My notes for this consultation are attached. If you have questions, please do not hesitate to call me. I look forward to following your patient along with you.       Sincerely,    Regina Roa, DO

## 2022-12-02 NOTE — PATIENT INSTRUCTIONS
What to expect after Shoulder Arthroscopy   Dr. Fiona Wagner should not have ANYTHING to eat or drink after midnight the night before your surgery. This includes NO gum, mints, candy, lifesavers or lollipops! Please make sure to remove ALL jewelry. When you arrive at the hospital or surgery center, you will be checked in and given an IV. You will be offered a nerve block, which I do recommend. This will be done pre-operatively by the anesthesiologist. It is an injection around the base of your neck that numbs the nerves to your shoulder and arm. It lasts anywhere from 12 hours to 3 days. This will give you pain relief that hopefully lasts throughout your first night. When the nerve block wears off, you will likely be in pain. This can range from mild to severe. If you experience severe pain, this is still normal. Nothing is wrong. You would have woken up with worse pain if you did not have the nerve block! During first three days, the pain is usually the worst, and then gradually subsides after that. Swelling in the shoulder, elbow and hands is normal. You may also experience bruising in the arm   If you elected to have the nerve block, you may have some residual numbness that may last weeks. You will likely continue to have pain for several weeks or even months. Recovery from shoulder surgery takes several months. BE PATIENT! Wear the sling at all times (even sleeping!) except for showering and for the exercises listed below. All you need to do for the first five weeks is the following (four times per day): Remove your sling and:   Flex and extend your elbow with your elbow next to your side. You may be instructed to begin shoulder pendulum exercises after your first post-operative visit:  lean forward slightly, and with your arm hanging down and your hand pointing toward the floor, perform small circles with your arm and shoulder.      At your first follow-up visit, you will have your sutures removed and will be given a script for physical therapy. If you did NOT have a repair, your therapy will begin at that time and you will stop your sling use   If you DID have a repair, you will continue your sling until 5 weeks post-op. At 5 weeks post-op, you will begin physical therapy and discontinue your sling that day   You will be in therapy for about 6 weeks - 10 weeks.    Driving is dangerous while in a sling and it is recommended that you wait until you are out of your sling to begin. (unless otherwise directed by your physician)   Your restrictions will be as follows: (unless otherwise directed by your physician)   NO use of the arm/shoulder (except for writing / typing, fine manipulation) for the first two weeks   If you DID NOT have a repair:   NO lifting / carrying / pushing / pulling greater than 10 lbs for 6 weeks   No repetitive overhead activity for 6 weeks   Return to sports typically at 6 -8 weeks   If you DID have a repair   NO lifting / carrying / pushing / pulling greater than 10 lbs for 4 months   No repetitive overhead activity for 4 months   Return to sports: 4-6 months depending on the sport  If you have any questions or concerns throughout this process, call the [place] [address] office at [phone]

## 2022-12-05 ENCOUNTER — TRANSCRIBE ORDER (OUTPATIENT)
Dept: REGISTRATION | Age: 72
End: 2022-12-05

## 2022-12-05 ENCOUNTER — HOSPITAL ENCOUNTER (OUTPATIENT)
Dept: GENERAL RADIOLOGY | Age: 72
Discharge: HOME | End: 2022-12-05
Payer: MEDICARE

## 2022-12-05 DIAGNOSIS — J06.9 ACUTE UPPER RESPIRATORY INFECTION, UNSPECIFIED: ICD-10-CM

## 2022-12-05 DIAGNOSIS — J06.9 ACUTE UPPER RESPIRATORY INFECTION, UNSPECIFIED: Primary | ICD-10-CM

## 2022-12-05 PROCEDURE — 71046 X-RAY EXAM CHEST 2 VIEWS: CPT

## 2022-12-08 DIAGNOSIS — Z98.890 STATUS POST ARTHROSCOPY OF SHOULDER: Primary | ICD-10-CM

## 2022-12-08 RX ORDER — NALOXONE HYDROCHLORIDE 4 MG/.1ML
SPRAY NASAL
Qty: 1 EACH | Refills: 0 | Status: SHIPPED | OUTPATIENT
Start: 2022-12-08

## 2022-12-08 RX ORDER — HYDROMORPHONE HYDROCHLORIDE 2 MG/1
2 TABLET ORAL
Qty: 28 TABLET | Refills: 0 | Status: SHIPPED | OUTPATIENT
Start: 2022-12-08 | End: 2022-12-15

## 2022-12-16 ENCOUNTER — OFFICE VISIT (OUTPATIENT)
Dept: ORTHOPEDIC SURGERY | Age: 72
End: 2022-12-16
Payer: MEDICARE

## 2022-12-16 VITALS — BODY MASS INDEX: 33.13 KG/M2 | WEIGHT: 187 LBS | HEIGHT: 63 IN

## 2022-12-16 DIAGNOSIS — Z98.890 STATUS POST ARTHROSCOPY OF SHOULDER: Primary | ICD-10-CM

## 2022-12-16 PROCEDURE — 99024 POSTOP FOLLOW-UP VISIT: CPT | Performed by: ORTHOPAEDIC SURGERY

## 2022-12-16 RX ORDER — ONDANSETRON 4 MG/1
4 TABLET, FILM COATED ORAL
Qty: 30 TABLET | Refills: 0 | Status: SHIPPED | OUTPATIENT
Start: 2022-12-16

## 2022-12-16 NOTE — LETTER
12/16/2022    Patient: Aminta Aguilar   YOB: 1950   Date of Visit: 12/16/2022     Silver Lane MD  [facility] [phone]  Via Fax: [phone]    Dear Silver Lane MD,      Thank you for referring Ms. Alia Lai to [facility] for evaluation. My notes for this consultation are attached. If you have questions, please do not hesitate to call me. I look forward to following your patient along with you.       Sincerely,    Frankie Chris, DO

## 2022-12-16 NOTE — PROGRESS NOTES
Gian Schwartz (: 1950) is a 71 y.o. female, established patient, here for evaluation of the following chief complaint(s):  Post OP Follow Up and Shoulder Pain       ASSESSMENT/PLAN:  Below is the assessment and plan developed based on review of pertinent history, physical exam, labs, studies, and medications. Findings were discussed with the patient today. We stressed the precautions and use of her sling. She may work on elbow range of motion. I will see her back in 1 month and we will start physical therapy at that point for passive range of motion. 1. Status post arthroscopy of shoulder  -     ondansetron hcl (Zofran) 4 mg tablet; Take 1 Tablet by mouth every eight (8) hours as needed for Nausea or Vomiting., Normal, Disp-30 Tablet, R-0      Return in about 4 weeks (around 2023). SUBJECTIVE/OBJECTIVE:  Gian Schwartz (: 1950) is a 71 y.o. female. She presents today for her first follow-up visit status post massive right rotator cuff repair. Overall she notes that she has had minimal pain. She did have 1 bout of nausea which was tolerable with Zofran. She has been wearing her sling. Allergies   Allergen Reactions    Crestor [Rosuvastatin] Myalgia     \"Pain in the liver location. \"    Mold Other (comments)     Asthma symptoms    Pcn [Penicillins] Anaphylaxis    Ibuprofen Rash    Levofloxacin Nausea Only       Current Outpatient Medications   Medication Sig    ondansetron hcl (Zofran) 4 mg tablet Take 1 Tablet by mouth every eight (8) hours as needed for Nausea or Vomiting. naloxone (NARCAN) 4 mg/actuation nasal spray Use 1 spray intranasally, then discard. Repeat with new spray every 2 min as needed for opioid overdose symptoms, alternating nostrils. furosemide (Lasix) 20 mg tablet Take  by mouth daily.     amLODIPine (NORVASC) 2.5 mg tablet TAKE 1 TABLET TWICE A DAY    dexAMETHasone (DECADRON) 2 mg tablet Take 10mg on day 1, then 8mg on day 2/3, then 6mg on day 4/5, then 4mg on day 6/7, then 2mg x 1 (Patient not taking: Reported on 12/2/2022)    Nebulizer & Compressor machine 1 Each by Does Not Apply route four (4) times daily as needed for Wheezing or Shortness of Breath. diazePAM (Valium) 5 mg tablet Take 1 Tablet by mouth three (3) times daily as needed (spasm). Max Daily Amount: 15 mg. (Patient not taking: Reported on 12/2/2022)    Su Buttery 160-9-4.8 mcg/actuation HFAA Take 2 Puffs by inhalation two (2) times a day. cholecalciferol, vitamin D3, (VITAMIN D3 PO) Take  by mouth.    metoprolol tartrate (LOPRESSOR) 25 mg tablet Take 0.5 Tablets by mouth two (2) times a day. Reduced dose    synthroid 50 mcg tablet Take 50 mcg by mouth Daily (before breakfast). diclofenac (VOLTAREN) 1 % gel as needed. pilocarpine (SALAGEN) 5 mg tablet pilocarpine 5 mg tablet   Take 1 tablet 3 times a day by oral route. conjugated estrogens (Premarin) 0.625 mg/gram vaginal cream every Tuesday and Friday. potassium citrate (UROCIT-K10) 10 mEq (1,080 mg) TbER Take 2 Tablets by mouth two (2) times a day. aspirin delayed-release 81 mg tablet Take 1 Tab by mouth daily. (Patient taking differently: Take 81 mg by mouth every other day.)    clopidogrel (PLAVIX) 75 mg tab Take 1 Tab by mouth daily. (Patient taking differently: Take 75 mg by mouth. EVERY 4 DAYS)    diphenhydrAMINE (BENADRYL) 25 mg capsule Take 25 mg by mouth every six (6) hours as needed. montelukast (SINGULAIR) 10 mg tablet Take 10 mg by mouth daily. albuterol (PROVENTIL VENTOLIN) 2.5 mg /3 mL (0.083 %) nebulizer solution 2.5 mg by Nebulization route every four (4) hours as needed for Wheezing. cyclobenzaprine (FLEXERIL) 10 mg tablet Take 10 mg by mouth three (3) times daily as needed for Muscle Spasm(s). zolpidem (AMBIEN) 10 mg tablet Take 5 mg by mouth nightly as needed for Sleep.  Indications: Takes 5mg or 10mg as needed     No current facility-administered medications for this visit. Social History     Socioeconomic History    Marital status:      Spouse name: Not on file    Number of children: Not on file    Years of education: Not on file    Highest education level: Not on file   Occupational History    Not on file   Tobacco Use    Smoking status: Former     Packs/day: 2.00     Years: 45.00     Pack years: 90.00     Types: Cigarettes     Quit date: 2013     Years since quittin.0    Smokeless tobacco: Never   Vaping Use    Vaping Use: Never used   Substance and Sexual Activity    Alcohol use:  Yes     Alcohol/week: 5.0 standard drinks     Types: 5 Glasses of wine per week     Comment: 5 glasses a week    Drug use: Not Currently    Sexual activity: Yes     Partners: Male   Other Topics Concern    Not on file   Social History Narrative    Not on file     Social Determinants of Health     Financial Resource Strain: Not on file   Food Insecurity: Not on file   Transportation Needs: Not on file   Physical Activity: Not on file   Stress: Not on file   Social Connections: Not on file   Intimate Partner Violence: Not on file   Housing Stability: Not on file       Past Surgical History:   Procedure Laterality Date    HX [address] [facility], [address], [address] [place] [address] Rd.      [facility]  Cardiology    HX HYSTERECTOMY      HX OOPHORECTOMY      HX ORTHOPAEDIC  ,     back surgery; wrist surgery     HX ORTHOPAEDIC      anterior cervical fusion C4-5    HX ORTHOPAEDIC  1984    cervical shaved C6       Family History   Problem Relation Age of Onset    Stroke Mother     Hypertension Mother     Dementia Mother     High Cholesterol Mother     Heart Disease Father     Hypertension Father     Kidney Disease Father     High Cholesterol Father     Hypertension Sister     Stroke Sister         Sjogren's        OB History          2    Para   2    Term   2            AB        Living             SAB        IA Ectopic        Molar        Multiple        Live Births   2                   REVIEW OF SYSTEMS:  ROS    Positive for: Musculoskeletal  Last edited by Jeanie Mccauley on 12/16/2022  9:09 AM.        Patient denies any recent fever, chills, nausea, vomiting, chest pain, or shortness of breath. Vitals:  Ht 5' 2.5\" (1.588 m)   Wt 187 lb (84.8 kg)   BMI 33.66 kg/m²    Body mass index is 33.66 kg/m². PHYSICAL EXAM:  General exam: Patient is awake, alert, and oriented x3. Well-appearing. No acute distress. Ambulates with a normal gait. Heart/Lungs:  no respiratory distress, palpable pulses    Right shoulder: Neurovascular and sensory intact. Incisions well-healed with no signs of erythema or drainage. Mild swelling and ecchymosis. Decreased range of motion. Normal stability. IMAGING:    XR Results (most recent):  Results from Hospital Encounter encounter on 12/05/22    XR CHEST PA LAT    Narrative  Clinical history: Cough  INDICATION:   Cough  COMPARISON: 10/6/2022    FINDINGS:  PA and lateral views of the chest are obtained. The cardiopericardial silhouette is within normal limits. There is no pleural  effusion, pneumothorax or focal consolidation present. Impression  No acute intrathoracic disease. Results from [place] encounter on 10/25/22    XR SHOULDER RT AP/LAT MIN 2 V    Narrative  EXAM: XR SHOULDER RT AP/LAT MIN 2 V    INDICATION: .  Right shoulder pain    COMPARISON: None. FINDINGS: Three views of the right shoulder demonstrate no fracture, dislocation  or other acute abnormality. There is moderate degenerative change, particularly  at the acromioclavicular joint, with hypertrophic bone and joint space narrowing  with articular sclerosis. Impression  1. No acute bony abnormality. 2. Degenerative change greatest at the acromioclavicular joint. .      XR TIB/FIB BI 2 V    Narrative  Clinical indication: Left leg pain.     Frontal and lateral view of the left tibia and fibula show some soft tissue  swelling but no significant bone findings. Impression  impression: No significant bone findings. Orders Placed This Encounter    ondansetron hcl (Zofran) 4 mg tablet     Sig: Take 1 Tablet by mouth every eight (8) hours as needed for Nausea or Vomiting. Dispense:  30 Tablet     Refill:  0              An electronic signature was used to authenticate this note.   -- Regina Mirza, DO

## 2023-01-16 ENCOUNTER — OFFICE VISIT (OUTPATIENT)
Dept: ORTHOPEDIC SURGERY | Age: 73
End: 2023-01-16
Payer: MEDICARE

## 2023-01-16 VITALS — WEIGHT: 187 LBS | HEIGHT: 63 IN | BODY MASS INDEX: 33.13 KG/M2

## 2023-01-16 DIAGNOSIS — M22.42 CHONDROMALACIA OF LEFT PATELLA: Primary | ICD-10-CM

## 2023-01-16 DIAGNOSIS — Z98.890 STATUS POST ARTHROSCOPY OF SHOULDER: ICD-10-CM

## 2023-01-16 PROCEDURE — 20610 DRAIN/INJ JOINT/BURSA W/O US: CPT | Performed by: ORTHOPAEDIC SURGERY

## 2023-01-16 PROCEDURE — 99024 POSTOP FOLLOW-UP VISIT: CPT | Performed by: ORTHOPAEDIC SURGERY

## 2023-01-16 RX ORDER — TRIAMCINOLONE ACETONIDE 40 MG/ML
40 INJECTION, SUSPENSION INTRA-ARTICULAR; INTRAMUSCULAR ONCE
Status: COMPLETED | OUTPATIENT
Start: 2023-01-16 | End: 2023-01-16

## 2023-01-16 RX ORDER — BUPIVACAINE HYDROCHLORIDE 7.5 MG/ML
3 INJECTION, SOLUTION EPIDURAL; RETROBULBAR ONCE
Status: COMPLETED | OUTPATIENT
Start: 2023-01-16 | End: 2023-01-16

## 2023-01-16 RX ADMIN — TRIAMCINOLONE ACETONIDE 40 MG: 40 INJECTION, SUSPENSION INTRA-ARTICULAR; INTRAMUSCULAR at 10:01

## 2023-01-16 RX ADMIN — BUPIVACAINE HYDROCHLORIDE 22.5 MG: 7.5 INJECTION, SOLUTION EPIDURAL; RETROBULBAR at 10:01

## 2023-01-16 NOTE — LETTER
1/16/2023    Patient: Tony Jefferson   YOB: 1950   Date of Visit: 1/16/2023     Mariam Monique MD  [facility] [phone]  Via Fax: [phone]    Dear Mariam Monique MD,      Thank you for referring Ms. Luciano Woodard to [facility] for evaluation. My notes for this consultation are attached. If you have questions, please do not hesitate to call me. I look forward to following your patient along with you.       Sincerely,    Yrn Jones, DO

## 2023-01-16 NOTE — PROGRESS NOTES
Cheyenne Scheuermann (: 1950) is a 71 y.o. female, established patient, here for evaluation of the following chief complaint(s):  Post OP Follow Up and Shoulder Pain       ASSESSMENT/PLAN:  Below is the assessment and plan developed based on review of pertinent history, physical exam, labs, studies, and medications. She will start a therapy regimen for her right shoulder to work on passive range of motion only. I will plan to see her back in 1 month. She elected to try corticosteroid injection for the knee. We discussed the risks and benefits of injection and informed consent was obtained. After a sterile preparation, 3 cc of bupivicaine and 40 mg of Kenalog were injected into the left knee. The patient tolerated the procedure well and there were no complications. Post injection pain, blood sugar elevation, skin discoloration, fatty atrophy and the signs of infection were discussed in detail. The patient was instructed to contact us if there were any questions or concerns prior to their follow up appointment. 1. Chondromalacia of left patella  2. Status post arthroscopy of shoulder  -     REFERRAL TO PHYSICAL THERAPY      Return in about 4 weeks (around 2023). SUBJECTIVE/OBJECTIVE:  Cheyenne Scheuermann (: 1950) is a 71 y.o. female. She notes that her right shoulder pain has been tolerable. She does note some aching pain and clicking and popping in the left knee. She notes previous corticosteroid injection helped her symptoms. Allergies   Allergen Reactions    Crestor [Rosuvastatin] Myalgia     \"Pain in the liver location. \"    Mold Other (comments)     Asthma symptoms    Pcn [Penicillins] Anaphylaxis    Ibuprofen Rash    Levofloxacin Nausea Only       Current Outpatient Medications   Medication Sig    ondansetron hcl (Zofran) 4 mg tablet Take 1 Tablet by mouth every eight (8) hours as needed for Nausea or Vomiting.     naloxone (NARCAN) 4 mg/actuation nasal spray Use 1 spray intranasally, then discard. Repeat with new spray every 2 min as needed for opioid overdose symptoms, alternating nostrils. furosemide (Lasix) 20 mg tablet Take  by mouth daily. amLODIPine (NORVASC) 2.5 mg tablet TAKE 1 TABLET TWICE A DAY    dexAMETHasone (DECADRON) 2 mg tablet Take 10mg on day 1, then 8mg on day 2/3, then 6mg on day 4/5, then 4mg on day 6/7, then 2mg x 1 (Patient not taking: Reported on 12/2/2022)    Nebulizer & Compressor machine 1 Each by Does Not Apply route four (4) times daily as needed for Wheezing or Shortness of Breath. diazePAM (Valium) 5 mg tablet Take 1 Tablet by mouth three (3) times daily as needed (spasm). Max Daily Amount: 15 mg. (Patient not taking: Reported on 12/2/2022)    Regina Ximena 160-9-4.8 mcg/actuation HFAA Take 2 Puffs by inhalation two (2) times a day. cholecalciferol, vitamin D3, (VITAMIN D3 PO) Take  by mouth.    metoprolol tartrate (LOPRESSOR) 25 mg tablet Take 0.5 Tablets by mouth two (2) times a day. Reduced dose    synthroid 50 mcg tablet Take 50 mcg by mouth Daily (before breakfast). diclofenac (VOLTAREN) 1 % gel as needed. pilocarpine (SALAGEN) 5 mg tablet pilocarpine 5 mg tablet   Take 1 tablet 3 times a day by oral route. conjugated estrogens (Premarin) 0.625 mg/gram vaginal cream every Tuesday and Friday. potassium citrate (UROCIT-K10) 10 mEq (1,080 mg) TbER Take 2 Tablets by mouth two (2) times a day. aspirin delayed-release 81 mg tablet Take 1 Tab by mouth daily. (Patient taking differently: Take 81 mg by mouth every other day.)    clopidogrel (PLAVIX) 75 mg tab Take 1 Tab by mouth daily. (Patient taking differently: Take 75 mg by mouth. EVERY 4 DAYS)    diphenhydrAMINE (BENADRYL) 25 mg capsule Take 25 mg by mouth every six (6) hours as needed. montelukast (SINGULAIR) 10 mg tablet Take 10 mg by mouth daily.     albuterol (PROVENTIL VENTOLIN) 2.5 mg /3 mL (0.083 %) nebulizer solution 2.5 mg by Nebulization route every four (4) hours as needed for Wheezing. cyclobenzaprine (FLEXERIL) 10 mg tablet Take 10 mg by mouth three (3) times daily as needed for Muscle Spasm(s). zolpidem (AMBIEN) 10 mg tablet Take 5 mg by mouth nightly as needed for Sleep. Indications: Takes 5mg or 10mg as needed     Current Facility-Administered Medications   Medication    triamcinolone acetonide (KENALOG-40) 40 mg/mL injection 40 mg    bupivacaine (PF) (MARCAINE) 0.75 % (7.5 mg/mL) injection 22.5 mg       Social History     Socioeconomic History    Marital status:      Spouse name: Not on file    Number of children: Not on file    Years of education: Not on file    Highest education level: Not on file   Occupational History    Not on file   Tobacco Use    Smoking status: Former     Packs/day: 2.00     Years: 45.00     Pack years: 90.00     Types: Cigarettes     Quit date: 2013     Years since quittin.1    Smokeless tobacco: Never   Vaping Use    Vaping Use: Never used   Substance and Sexual Activity    Alcohol use:  Yes     Alcohol/week: 5.0 standard drinks     Types: 5 Glasses of wine per week     Comment: 5 glasses a week    Drug use: Not Currently    Sexual activity: Yes     Partners: Male   Other Topics Concern    Not on file   Social History Narrative    Not on file     Social Determinants of Health     Financial Resource Strain: Not on file   Food Insecurity: Not on file   Transportation Needs: Not on file   Physical Activity: Not on file   Stress: Not on file   Social Connections: Not on file   Intimate Partner Violence: Not on file   Housing Stability: Not on file       Past Surgical History:   Procedure Laterality Date    HX APPENDECTOMY      [facility], 2005    [address]      [facility]  Cardiology    HX HYSTERECTOMY      HX OOPHORECTOMY      HX ORTHOPAEDIC  ,     back surgery; wrist surgery     HX ORTHOPAEDIC      anterior cervical fusion C4-5    HX ORTHOPAEDIC 1984    cervical shaved C6       Family History   Problem Relation Age of Onset    Stroke Mother     Hypertension Mother     Dementia Mother     High Cholesterol Mother     Heart Disease Father     Hypertension Father     Kidney Disease Father     High Cholesterol Father     Hypertension Sister     Stroke Sister         Sjogren's        OB History          2    Para   2    Term   2            AB        Living             SAB        IAB        Ectopic        Molar        Multiple        Live Births   2                   REVIEW OF SYSTEMS:  ROS    Positive for: Musculoskeletal  Last edited by Rick Hale on 2023  9:24 AM.        Patient denies any recent fever, chills, nausea, vomiting, chest pain, or shortness of breath. Vitals:  Ht 5' 2.5\" (1.588 m)   Wt 187 lb (84.8 kg)   BMI 33.66 kg/m²    Body mass index is 33.66 kg/m². PHYSICAL EXAM:  General exam: Patient is awake, alert, and oriented x3. Well-appearing. No acute distress. Ambulates with an antalgic gait. Heart/Lungs:  no respiratory distress, palpable pulses    Right shoulder: Passive forward flexion abduction 90 degrees. Normal elbow range of motion. Left knee: Neurovascular and sensory intact. There is tenderness to palpation in the parapatellar region. There is crepitus with range of motion. No significant effusion noted. There is no joint line tenderness to palpation. Jen's maneuver is nonpainful. Normal stability on Lachman's exam and anterior/posterior drawer testing. No erythema or ecchymosis. IMAGING:    XR Results (most recent):  Results from Hospital Encounter encounter on 22    XR CHEST PA LAT    Narrative  Clinical history: Cough  INDICATION:   Cough  COMPARISON: 10/6/2022    FINDINGS:  PA and lateral views of the chest are obtained. The cardiopericardial silhouette is within normal limits.  There is no pleural  effusion, pneumothorax or focal consolidation present. Impression  No acute intrathoracic disease. Results from [place] encounter on 10/25/22    XR SHOULDER RT AP/LAT MIN 2 V    Narrative  EXAM: XR SHOULDER RT AP/LAT MIN 2 V    INDICATION: .  Right shoulder pain    COMPARISON: None. FINDINGS: Three views of the right shoulder demonstrate no fracture, dislocation  or other acute abnormality. There is moderate degenerative change, particularly  at the acromioclavicular joint, with hypertrophic bone and joint space narrowing  with articular sclerosis. Impression  1. No acute bony abnormality. 2. Degenerative change greatest at the acromioclavicular joint. .      XR TIB/FIB BI 2 V    Narrative  Clinical indication: Left leg pain. Frontal and lateral view of the left tibia and fibula show some soft tissue  swelling but no significant bone findings. Impression  impression: No significant bone findings. Orders Placed This Encounter    REFERRAL TO PHYSICAL THERAPY     Referral Priority:   Routine     Referral Type:   PT/OT/ST     Referral Reason:   Specialty Services Required     Number of Visits Requested:   1    triamcinolone acetonide (KENALOG-40) 40 mg/mL injection 40 mg    bupivacaine (PF) (MARCAINE) 0.75 % (7.5 mg/mL) injection 22.5 mg              An electronic signature was used to authenticate this note.   -- Oscar Hernandez, DO

## 2023-01-18 RX ORDER — TRAMADOL HYDROCHLORIDE 50 MG/1
50 TABLET ORAL
Qty: 25 TABLET | Refills: 0 | Status: SHIPPED | OUTPATIENT
Start: 2023-01-18 | End: 2023-01-25

## 2023-01-30 ENCOUNTER — TRANSCRIBE ORDER (OUTPATIENT)
Dept: REGISTRATION | Age: 73
End: 2023-01-30

## 2023-01-30 ENCOUNTER — HOSPITAL ENCOUNTER (OUTPATIENT)
Dept: GENERAL RADIOLOGY | Age: 73
Discharge: HOME | End: 2023-01-30
Payer: MEDICARE

## 2023-01-30 DIAGNOSIS — K59.03 DRUG-INDUCED CONSTIPATION: Primary | ICD-10-CM

## 2023-01-30 DIAGNOSIS — K59.03 DRUG-INDUCED CONSTIPATION: ICD-10-CM

## 2023-01-30 PROCEDURE — 74022 RADEX COMPL AQT ABD SERIES: CPT

## 2023-02-13 ENCOUNTER — OFFICE VISIT (OUTPATIENT)
Dept: ORTHOPEDIC SURGERY | Age: 73
End: 2023-02-13
Payer: MEDICARE

## 2023-02-13 VITALS — WEIGHT: 187 LBS | BODY MASS INDEX: 33.13 KG/M2 | HEIGHT: 63 IN

## 2023-02-13 DIAGNOSIS — Z98.890 STATUS POST ARTHROSCOPY OF SHOULDER: Primary | ICD-10-CM

## 2023-02-13 PROCEDURE — 99024 POSTOP FOLLOW-UP VISIT: CPT | Performed by: ORTHOPAEDIC SURGERY

## 2023-02-13 NOTE — PROGRESS NOTES
Javier Martinez (: 1950) is a 68 y.o. female, established patient, here for evaluation of the following chief complaint(s):  Post OP Follow Up and Shoulder Pain       ASSESSMENT/PLAN:  Below is the assessment and plan developed based on review of pertinent history, physical exam, labs, studies, and medications. Overall she is doing very well in her recovery. She we will continue with her therapy regimen. We discussed precautions as she starts to increase her active range of motion and early strengthening. I will see her back in 2 months to assess her progress. 1. Status post arthroscopy of shoulder      Return in about 2 months (around 2023). SUBJECTIVE/OBJECTIVE:  Javier Martinez (: 1950) is a 68 y.o. female. She presents today for follow-up of her massive right rotator cuff repair. Overall she notes that her range of motion active and passive is improving. She denies significant pain at this time. Allergies   Allergen Reactions    Crestor [Rosuvastatin] Myalgia     \"Pain in the liver location. \"    Mold Other (comments)     Asthma symptoms    Pcn [Penicillins] Anaphylaxis    Ibuprofen Rash    Levofloxacin Nausea Only       Current Outpatient Medications   Medication Sig    ondansetron hcl (Zofran) 4 mg tablet Take 1 Tablet by mouth every eight (8) hours as needed for Nausea or Vomiting. naloxone (NARCAN) 4 mg/actuation nasal spray Use 1 spray intranasally, then discard. Repeat with new spray every 2 min as needed for opioid overdose symptoms, alternating nostrils. furosemide (Lasix) 20 mg tablet Take  by mouth daily.     amLODIPine (NORVASC) 2.5 mg tablet TAKE 1 TABLET TWICE A DAY    dexAMETHasone (DECADRON) 2 mg tablet Take 10mg on day 1, then 8mg on day 2/3, then 6mg on day 4/5, then 4mg on day 6/7, then 2mg x 1 (Patient not taking: Reported on 2022)    Nebulizer & Compressor machine 1 Each by Does Not Apply route four (4) times daily as needed for Wheezing or Shortness of Breath. diazePAM (Valium) 5 mg tablet Take 1 Tablet by mouth three (3) times daily as needed (spasm). Max Daily Amount: 15 mg. (Patient not taking: Reported on 12/2/2022)    Gio Blanco 160-9-4.8 mcg/actuation HFAA Take 2 Puffs by inhalation two (2) times a day. cholecalciferol, vitamin D3, (VITAMIN D3 PO) Take  by mouth.    metoprolol tartrate (LOPRESSOR) 25 mg tablet Take 0.5 Tablets by mouth two (2) times a day. Reduced dose    synthroid 50 mcg tablet Take 50 mcg by mouth Daily (before breakfast). diclofenac (VOLTAREN) 1 % gel as needed. pilocarpine (SALAGEN) 5 mg tablet pilocarpine 5 mg tablet   Take 1 tablet 3 times a day by oral route. conjugated estrogens (Premarin) 0.625 mg/gram vaginal cream every Tuesday and Friday. potassium citrate (UROCIT-K10) 10 mEq (1,080 mg) TbER Take 2 Tablets by mouth two (2) times a day. aspirin delayed-release 81 mg tablet Take 1 Tab by mouth daily. (Patient taking differently: Take 81 mg by mouth every other day.)    clopidogrel (PLAVIX) 75 mg tab Take 1 Tab by mouth daily. (Patient taking differently: Take 75 mg by mouth. EVERY 4 DAYS)    diphenhydrAMINE (BENADRYL) 25 mg capsule Take 25 mg by mouth every six (6) hours as needed. montelukast (SINGULAIR) 10 mg tablet Take 10 mg by mouth daily. albuterol (PROVENTIL VENTOLIN) 2.5 mg /3 mL (0.083 %) nebulizer solution 2.5 mg by Nebulization route every four (4) hours as needed for Wheezing. cyclobenzaprine (FLEXERIL) 10 mg tablet Take 10 mg by mouth three (3) times daily as needed for Muscle Spasm(s). zolpidem (AMBIEN) 10 mg tablet Take 5 mg by mouth nightly as needed for Sleep. Indications: Takes 5mg or 10mg as needed     No current facility-administered medications for this visit.        Social History     Socioeconomic History    Marital status:      Spouse name: Not on file    Number of children: Not on file    Years of education: Not on file    Highest education level: Not on file   Occupational History    Not on file   Tobacco Use    Smoking status: Former     Packs/day: 2.00     Years: 45.00     Pack years: 90.00     Types: Cigarettes     Quit date: 2013     Years since quittin.1    Smokeless tobacco: Never   Vaping Use    Vaping Use: Never used   Substance and Sexual Activity    Alcohol use:  Yes     Alcohol/week: 5.0 standard drinks     Types: 5 Glasses of wine per week     Comment: 5 glasses a week    Drug use: Not Currently    Sexual activity: Yes     Partners: Male   Other Topics Concern    Not on file   Social History Narrative    Not on file     Social Determinants of Health     Financial Resource Strain: Not on file   Food Insecurity: Not on file   Transportation Needs: Not on file   Physical Activity: Not on file   Stress: Not on file   Social Connections: Not on file   Intimate Partner Violence: Not on file   Housing Stability: Not on file       Past Surgical History:   Procedure Laterality Date    HX APPENDECTOMY  [facility],     [address]      [facility]  Cardiology    HX HYSTERECTOMY      HX OOPHORECTOMY      HX ORTHOPAEDIC  ,     back surgery; wrist surgery     HX ORTHOPAEDIC      anterior cervical fusion C4-5    HX ORTHOPAEDIC  1984    cervical shaved C6       Family History   Problem Relation Age of Onset    Stroke Mother     Hypertension Mother     Dementia Mother     High Cholesterol Mother     Heart Disease Father     Hypertension Father     Kidney Disease Father     High Cholesterol Father     Hypertension Sister     Stroke Sister         Sjogren's        OB History          2    Para   2    Term   2            AB        Living             SAB        IAB        Ectopic        Molar        Multiple        Live Births   2                   REVIEW OF SYSTEMS:  ROS    Positive for: Musculoskeletal  Last edited by Jefry Hartley on 2023  9:14 AM. Patient denies any recent fever, chills, nausea, vomiting, chest pain, or shortness of breath. Vitals:  Ht 5' 2.5\" (1.588 m)   Wt 187 lb (84.8 kg)   BMI 33.66 kg/m²    Body mass index is 33.66 kg/m². PHYSICAL EXAM:  General exam: Patient is awake, alert, and oriented x3. Well-appearing. No acute distress. Ambulates with a normal gait. Heart/Lungs:  no respiratory distress, palpable pulses    Right shoulder: Active forward flexion abduction 130 degrees today. Normal stability. Improving strength on gentle testing today    IMAGING:    XR Results (most recent):  Results from Hospital Encounter encounter on 01/30/23    XR ABD ACUTE W 1 V CHEST    Narrative  EXAM: XR ABD ACUTE W 1 V CHEST    INDICATION: Drug induced constipation    COMPARISON: 12/5/2022. FINDINGS: The upright chest radiograph demonstrates clear lungs and normal  cardiac and mediastinal contours. There is no pleural effusion or free air under  the diaphragm. Supine and upright views of the abdomen demonstrate a nonobstructive bowel gas  pattern. The amount of retained stool is increased. There is no free  intraperitoneal air. No soft tissue masses or pathologic calcifications are  identified. The bones are within normal limits. Impression  No acute cardiopulmonary process seen  Constipation      Results from Hospital Encounter encounter on 12/05/22    XR CHEST PA LAT    Narrative  Clinical history: Cough  INDICATION:   Cough  COMPARISON: 10/6/2022    FINDINGS:  PA and lateral views of the chest are obtained. The cardiopericardial silhouette is within normal limits. There is no pleural  effusion, pneumothorax or focal consolidation present. Impression  No acute intrathoracic disease. Results from [place] encounter on 10/25/22    XR SHOULDER RT AP/LAT MIN 2 V    Narrative  EXAM: XR SHOULDER RT AP/LAT MIN 2 V    INDICATION: .  Right shoulder pain    COMPARISON: None.     FINDINGS: Three views of the right shoulder demonstrate no fracture, dislocation  or other acute abnormality. There is moderate degenerative change, particularly  at the acromioclavicular joint, with hypertrophic bone and joint space narrowing  with articular sclerosis. Impression  1. No acute bony abnormality. 2. Degenerative change greatest at the acromioclavicular joint. .         No orders of the defined types were placed in this encounter. An electronic signature was used to authenticate this note.   -- Selam Mcgraw DO

## 2023-02-13 NOTE — LETTER
2/13/2023    Patient: Suad Swain   YOB: 1950   Date of Visit: 2/13/2023     Julio Pino MD  [facility] [id number]  Via Fax: [phone]    Dear Julio Pino MD,      Thank you for referring Ms. Sky Arias to [facility] for evaluation. My notes for this consultation are attached. If you have questions, please do not hesitate to call me. I look forward to following your patient along with you.       Sincerely,    Vikram Pro, DO

## 2023-02-22 NOTE — PROGRESS NOTES
[place] [address], [address] [place]  [phone]  [address] [facility], [address]     Subjective:      ADAMA Alcaraz-BC    Ese Gomez is a 68 y.o. female is here for routine f/u. Hx hypertension, DJD, asthma with cervical pain, intermittent non-radiating CP--non-exertional  Prior Stress MPI 2011 abnormal with subsequent cardiac cath 2011 neg for CAD. Seen by Dr Ruth Leblanc 5/29/18--labs sent (CBC, CMP, lipids--chol 226, , HDL 64, ), thyroids, CPK, trop--normal); Some intolerance to statins in past (lovastatin, crestor) with myalgias, currently back on Crestor 5 mg--ok. Echo 5/23/18 with LVEF 27-12, grade I diastolic, valves ok. Carotid dopplers 5/23/18 with mild bilateral ICA plaque (16-49%), antegrade vertebrals. Stress MPI 6/4/18 Jose Maria treadmill 3:51, , chest pain prior and throughout test with no changes, no ischemic ST changes,  normal perfusion/no ischemia, LVEF 70. +FH CAD, CVA, hypertension, dyslipidemia. Continues to see PCP--OV 12/2020. Former smoker quit 2013. Holter monitor 1/19--NSR, rare ectopy, no afib. Seen by Dr Naif Pearson in 4/2022 for abnormal cardiac findings on a screening chest CT:  The patient states that after having a recent abnormal screening CAT scan showing the below cardiac findings, she notes that in hindsight she is a little bit more short of breath than usual, and did have an episode of chest discomfort that awoke him from sleep on approximately March 22 in the setting of stress thinking about her multiple sick family members including 2 sisters and brother-in-law. Denies orthopnea, PND, LE edema, syncope, presyncope or fatigue. Seen by me 7/12/2022 Endorses more of the sob,  Occasional cp--not sure if heart related or gas. She will arrange her CT heart scan. Also getting pulmonary work up with Dr Chema Guaman    Had Asim CAC score of 54 per CT heart scan 8/17/2022.     Last seen by me in 8/2022  Saw PCP 8/6/2022, labs and CXR were done but no result to review. Completed steroid taper. No further palpitation since off phentermine and steroid  Stable sob      She is Status post arthroscopy of shoulder since    Having issues with wt loss despite dietary restrictions etc,  thinks BB is slowing her metabolism. She has started working out at a [employer] 5 days a week, no exertional symptoms. Stable occasional sob, due to copd    The patient denies chest pain, orthopnea, PND, LE edema, palpitations, syncope, presyncope or fatigue. Chest CT at [facility] 3/31/2022: Incidental finding:  Regions of fatty metaplasia involving the myocardium consistent with sequelae of antecedent ischemic event-infarction. Other findings were small lung nodules unchanged, no new suspicious pulmonary nodules, mild centrilobular emphysema, evidence of small airways disease, mild biapical scarring left greater than right.        Patient Active Problem List    Diagnosis Date Noted    TIA (transient ischemic attack) 11/07/2018    Other chest pain 06/12/2018    Hypertension     Dyslipidemia       Antoinette Ortiz MD  Past Medical History:   Diagnosis Date    Arthritis     Asthma     Chronic obstructive pulmonary disease ([address]     Chronic pain     Dyslipidemia     Hypertension     Ill-defined condition     Kidney stones    Long term current use of anticoagulant therapy     ASA  & Plavix    Lumbar disc disease     Lumbar spondylosis     Menopause     Migraine headache     Stroke ([address] 10/2018    Thyroid disease       Past Surgical History:   Procedure Laterality Date    HX APPENDECTOMY  1977    HX CERVICAL LAMINECTOMY  1989, 2005    HX GYN      [facility] HEART CATHETERIZATION  2011    [facility]  Cardiology    HX HYSTERECTOMY      HX OOPHORECTOMY      HX ORTHOPAEDIC  2013, 2014    back surgery; wrist surgery     HX ORTHOPAEDIC  1987    anterior cervical fusion C4-5    HX ORTHOPAEDIC  1984    cervical shaved C6     Allergies   Allergen Reactions Crestor [Rosuvastatin] Myalgia     \"Pain in the liver location. \"    Mold Other (comments)     Asthma symptoms    Pcn [Penicillins] Anaphylaxis    Ibuprofen Rash    Levofloxacin Nausea Only      Family History   Problem Relation Age of Onset    Stroke Mother     Hypertension Mother     Dementia Mother     High Cholesterol Mother     Heart Disease Father     Hypertension Father     Kidney Disease Father     High Cholesterol Father     Hypertension Sister     Stroke Sister         Sjogren's      Social History     Socioeconomic History    Marital status:      Spouse name: Not on file    Number of children: Not on file    Years of education: Not on file    Highest education level: Not on file   Occupational History    Not on file   Tobacco Use    Smoking status: Former     Packs/day: 2.00     Years: 45.00     Pack years: 90.00     Types: Cigarettes     Quit date: 2013     Years since quittin.2    Smokeless tobacco: Never   Vaping Use    Vaping Use: Never used   Substance and Sexual Activity    Alcohol use: Yes     Alcohol/week: 5.0 standard drinks     Types: 5 Glasses of wine per week     Comment: 5 glasses a week    Drug use: Not Currently    Sexual activity: Yes     Partners: Male   Other Topics Concern    Not on file   Social History Narrative    Not on file     Social Determinants of Health     Financial Resource Strain: Not on file   Food Insecurity: Not on file   Transportation Needs: Not on file   Physical Activity: Not on file   Stress: Not on file   Social Connections: Not on file   Intimate Partner Violence: Not on file   Housing Stability: Not on file      Current Outpatient Medications   Medication Sig    furosemide (LASIX) 20 mg tablet Take  by mouth daily. amLODIPine (NORVASC) 2.5 mg tablet TAKE 1 TABLET TWICE A DAY    Nebulizer & Compressor machine 1 Each by Does Not Apply route four (4) times daily as needed for Wheezing or Shortness of Breath.     [facility]-9-4.8 mcg/actuation HFAA Take 2 Puffs by inhalation two (2) times a day. cholecalciferol, vitamin D3, (VITAMIN D3 PO) Take  by mouth.    metoprolol tartrate (LOPRESSOR) 25 mg tablet Take 0.5 Tablets by mouth two (2) times a day. Reduced dose    diclofenac (VOLTAREN) 1 % gel as needed. pilocarpine (SALAGEN) 5 mg tablet pilocarpine 5 mg tablet   Take 1 tablet 3 times a day by oral route. conjugated estrogens (Premarin) 0.625 mg/gram vaginal cream every Tuesday and Friday. aspirin delayed-release 81 mg tablet Take 1 Tab by mouth daily. (Patient taking differently: Take 81 mg by mouth every other day.)    clopidogrel (PLAVIX) 75 mg tab Take 1 Tab by mouth daily. (Patient taking differently: Take 75 mg by mouth. EVERY 4 DAYS)    diphenhydrAMINE (BENADRYL) 25 mg capsule Take 25 mg by mouth every six (6) hours as needed. montelukast (SINGULAIR) 10 mg tablet Take 10 mg by mouth daily. albuterol (PROVENTIL VENTOLIN) 2.5 mg /3 mL (0.083 %) nebulizer solution 2.5 mg by Nebulization route every four (4) hours as needed for Wheezing. cyclobenzaprine (FLEXERIL) 10 mg tablet Take 10 mg by mouth three (3) times daily as needed for Muscle Spasm(s). zolpidem (AMBIEN) 10 mg tablet Take 5 mg by mouth nightly as needed for Sleep. Indications: Takes 5mg or 10mg as needed    synthroid 50 mcg tablet Take 25 mcg by mouth Daily (before breakfast). No current facility-administered medications for this visit. Review of Symptoms:  11 systems reviewed, negative other than as stated in the HPI    Physical ExamPhysical Exam:    Vitals:    03/01/23 0934   BP: 124/72   Pulse: 86   Resp: 20   Temp: 98.6 °F (37 °C)   TempSrc: Temporal   SpO2: 95%   Weight: 196 lb (88.9 kg)   Height: 5' 2.5\" (1.588 m)         Body mass index is 35.28 kg/m². General PE   General:  Well developed, in no acute distress, cooperative and alert  HEENT: No carotid bruits, no JVD, trach is midline. Neck Supple, PEERL, EOM intact.   Heart:  reg rate and rhythm; normal S1/S2; no murmurs, gallops or rubs. Respiratory: Clear bilaterally x 4, no wheezing or rales  Abdomen:   Soft, non-tender, no distention, no masses. + BS. Extremities:  Normal cap refill, no cyanosis, atraumatic. No edema. Neuro: A&Ox3, speech clear, gait stable. Skin: Skin color is normal. No rashes or lesions. Non diaphoretic  Vascular: 2+ pulses symmetric in all extremities    Labs:   Lab Results   Component Value Date/Time    Cholesterol, total 158 11/08/2018 05:30 AM    HDL Cholesterol 63 11/08/2018 05:30 AM    LDL, calculated 73.2 11/08/2018 05:30 AM    Triglyceride 109 11/08/2018 05:30 AM    CHOL/HDL Ratio 2.5 11/08/2018 05:30 AM     Lab Results   Component Value Date/Time    CK 56 05/14/2018 10:41 AM     Lab Results   Component Value Date/Time    Sodium 137 09/29/2022 11:55 PM    Potassium 4.2 09/29/2022 11:55 PM    Chloride 101 09/29/2022 11:55 PM    CO2 28 09/29/2022 11:55 PM    Anion gap 8 09/29/2022 11:55 PM    Glucose 112 (H) 09/29/2022 11:55 PM    BUN 28 (H) 09/29/2022 11:55 PM    Creatinine 1.40 (H) 09/29/2022 11:55 PM    BUN/Creatinine ratio 20 09/29/2022 11:55 PM    GFR est AA 45 (L) 09/29/2022 11:55 PM    GFR est non-AA 37 (L) 09/29/2022 11:55 PM    Calcium 9.5 09/29/2022 11:55 PM    Bilirubin, total 0.3 09/29/2022 11:55 PM    Alk. phosphatase 108 09/29/2022 11:55 PM    Protein, total 6.7 09/29/2022 11:55 PM    Albumin 3.8 09/29/2022 11:55 PM    Globulin 2.9 09/29/2022 11:55 PM    A-G Ratio 1.3 09/29/2022 11:55 PM    ALT (SGPT) 30 09/29/2022 11:55 PM       EKG:       Assessment:     Assessment:        ICD-10-CM ICD-9-CM    1. Essential hypertension  I10 401.9 AMB POC EKG ROUTINE W/ 12 LEADS, INTER & REP      2. Atherosclerosis of native coronary artery of native heart with angina pectoris (HCC)  I25.119 414.01 AMB POC EKG ROUTINE W/ 12 LEADS, INTER & REP     413.9       3. Dyslipidemia  E78.5 272.4 AMB POC EKG ROUTINE W/ 12 LEADS, INTER & REP      4.  HATCH (dyspnea on exertion)  R06.09 786.09 AMB POC EKG ROUTINE W/ 12 LEADS, INTER & REP      5. Agatston CAC score, <100  R93.1 793.2 AMB POC EKG ROUTINE W/ 12 LEADS, INTER & REP      6. Palpitations  R00.2 785.1 AMB POC EKG ROUTINE W/ 12 LEADS, INTER & REP      7. Primary hypertension  I10 401.9 AMB POC EKG ROUTINE W/ 12 LEADS, INTER & REP      8. BMI 33.0-33.9,adult  Z68.33 V85.33 AMB POC EKG ROUTINE W/ 12 LEADS, INTER & REP          Orders Placed This Encounter    AMB POC EKG ROUTINE W/ 12 LEADS, INTER & REP     Order Specific Question:   Reason for Exam:     Answer:   Hypertension         04/11/22    ECHO ADULT COMPLETE 04/13/2022 4/13/2022    Interpretation Summary    Left Ventricle: Left ventricle size is normal. Mild basal septal thickening. Normal wall motion. Normal left ventricular systolic function with a visually estimated EF of 55 - 60%. Normal diastolic function. Signed by: Ana Lind MD on 4/13/2022  9:03 AM    05/06/22    NUCLEAR CARDIAC STRESS TEST 05/06/2022, 05/06/2022 5/6/2022    Interpretation Summary    ECG: Resting ECG demonstrates normal sinus rhythm. Stress Test: A Jose Maria protocol stress test was performed. Overall, the patient's exercise capacity was below average for their age. The patient exercised for 2 min and 6 sec. Speed/angle adjusted on the treadmill after 1:49 r/t pt dyspnea per NP. Blood pressure demonstrated a normal response and heart rate demonstrated a normal response to stress. The patient's heart rate recovery was normal.    Non-diagnostic stress test.  Nuclear images to follow. TWI inferior leads once standing. Became very dyspneic very quickly. INDICATION: Chest pain, hypertension. COMPARISON:  None. CORRELATIVE IMAGING STUDIES:  None    TRACER: Tc 99m Sestamibi    TECHNIQUE:  Resting SPECT images of the heart were obtained following the uneventful intravenous administration of 10.8 mCi of Tc 99m Sestamibi.     Gated stress SPECT images of the heart were obtained following Jose Maria exercise protocol and the uneventful intravenous administration of 31.8 mCi of Tc 99m Sestamibi. FINDINGS:  The rest and stress perfusion images demonstrate no significant perfusion defect or evidence of myocardial reversibility. The gated images demonstrate normal global and regional wall motion and thickening. Left ventricular ejection fraction is 87 %. Impression:  Normal gated rest/stress myocardial perfusion imaging study. No evidence of myocardial ischemia or infarction. Left ventricular ejection fraction is 87 %. Signed by: Vinicius Reyes MD on 5/6/2022 12:52 PM, Signed by: Jesica Carey MD on 5/6/2022  2:06 PM      CT Results (most recent):  Results from [place] encounter on 10/06/22    CT CHEST WO CONT    Narrative  EXAM: CT CHEST WO CONT    INDICATION: Pneumonia, chronic cough    COMPARISON: 1.22.2018. CONTRAST: None. TECHNIQUE: Unenhanced multislice helical CT was performed from the thoracic  inlet to the adrenal glands without intravenous contrast administration. Contiguous 5 mm axial images were reconstructed and lung and soft tissue windows  were generated. Coronal and sagittal reformations were generated. CT dose  reduction was achieved through use of a standardized protocol tailored for this  examination and automatic exposure control for dose modulation. FINDINGS:  The lungs are clear of mass, nodule, airspace disease or edema. The absence of intravenous contrast reduces the sensitivity for evaluation of  the mediastinum and upper abdominal organs. The aorta has a normal contour with no evidence of aneurysm. No mediastinal or  hilar or axillary adenopathy is appreciated. Fatty infiltration of the  interatrial septum. Early coronary artery calcification. The visualized portions of the upper abdominal organs are normal.    Impression  No evidence for residual pneumonia.        Plan:       Agatston CAC score of 54 per CT heart scan 8/17/2022  HATCH/precordial pain  Normal EF 55-60%  valves ok per echo in 5/2022. Echo 5/23/18 with LVEF 28-96, grade I diastolic, valves ok  NST in 5/2022 Normal gated rest/stress myocardial perfusion imaging study. No evidence of myocardial ischemia or infarction. Left ventricular ejection fraction is 87 %. Stress MPI 6/4/18 Jose Maria treadmill 3:51, , chest pain prior and throughout test with no changes, no ischemic ST changes,  normal perfusion/no ischemia, LVEF 70  Recall Stress MPI 2011 abnormal with subsequent cardiac cath 2011 neg for CAD[facility]  On ASA/Plavix, BB, CCB  Wants to come off BB    Chest CT at [facility] 3/31/2022: Incidental finding:  Regions of fatty metaplasia involving the myocardium consistent with sequelae of antecedent ischemic event-infarction    HTN  Controlled with current therapy  Wants  to come off BB--will take every other day for  a week then dc  The patient will monitor BP at home and call if generally >140/85. Serum Cr 1.03 in 1/2023;  Serum Cr 1.4 in 11/2022      HLD  1/2023 ; ; HDL 74;   Taking red yeast rice;   1/2022 , triglycerides 281, HDL 55 on labs faxed 3/31/2022 (exact date of labs is not apparent but suspected to be around that time), and noted statin intolerance with Crestor causing \"pain in the liver region,\" and she states also severe myalgias on 2 occasions with Crestor and previously with pravastatin. Labs and lipids per PCP    COPD  On inhalers  Followed by Dr Vernon Arredondo    Hx TIA in 9/2018  Carotid artery disease Duplex in 2018  1.  16-49% stenosis in the right ICA. 2.  16-49% stenosis in the left ICA. On ASA/Plavix, statin intolerant    Patient was made aware during visit today that all testing completed would be instantaneously available on their MyChart for review. Discussed that these results will be made available to the provider at the same time.   They were advised to wait at least 3 business days to allow for provider's interpretation of results with follow-up before calling our office with concerns about their results.       Continue current care and follow up in 1 mos    Qasim Carlos NP

## 2023-03-01 ENCOUNTER — OFFICE VISIT (OUTPATIENT)
Dept: CARDIOLOGY CLINIC | Age: 73
End: 2023-03-01
Payer: MEDICARE

## 2023-03-01 VITALS
HEIGHT: 63 IN | TEMPERATURE: 98.6 F | RESPIRATION RATE: 20 BRPM | WEIGHT: 196 LBS | OXYGEN SATURATION: 95 % | SYSTOLIC BLOOD PRESSURE: 124 MMHG | DIASTOLIC BLOOD PRESSURE: 72 MMHG | HEART RATE: 86 BPM | BODY MASS INDEX: 34.73 KG/M2

## 2023-03-01 DIAGNOSIS — R93.1 AGATSTON CAC SCORE, <100: ICD-10-CM

## 2023-03-01 DIAGNOSIS — R00.2 PALPITATIONS: ICD-10-CM

## 2023-03-01 DIAGNOSIS — I10 PRIMARY HYPERTENSION: ICD-10-CM

## 2023-03-01 DIAGNOSIS — R06.09 DOE (DYSPNEA ON EXERTION): ICD-10-CM

## 2023-03-01 DIAGNOSIS — I10 ESSENTIAL HYPERTENSION: Primary | ICD-10-CM

## 2023-03-01 DIAGNOSIS — E78.5 DYSLIPIDEMIA: ICD-10-CM

## 2023-03-01 DIAGNOSIS — I25.119 ATHEROSCLEROSIS OF NATIVE CORONARY ARTERY OF NATIVE HEART WITH ANGINA PECTORIS (HCC): ICD-10-CM

## 2023-03-01 PROCEDURE — G8536 NO DOC ELDER MAL SCRN: HCPCS | Performed by: NURSE PRACTITIONER

## 2023-03-01 PROCEDURE — 1123F ACP DISCUSS/DSCN MKR DOCD: CPT | Performed by: NURSE PRACTITIONER

## 2023-03-01 PROCEDURE — G9899 SCRN MAM PERF RSLTS DOC: HCPCS | Performed by: NURSE PRACTITIONER

## 2023-03-01 PROCEDURE — 3017F COLORECTAL CA SCREEN DOC REV: CPT | Performed by: NURSE PRACTITIONER

## 2023-03-01 PROCEDURE — 3074F SYST BP LT 130 MM HG: CPT | Performed by: NURSE PRACTITIONER

## 2023-03-01 PROCEDURE — 99214 OFFICE O/P EST MOD 30 MIN: CPT | Performed by: NURSE PRACTITIONER

## 2023-03-01 PROCEDURE — G8417 CALC BMI ABV UP PARAM F/U: HCPCS | Performed by: NURSE PRACTITIONER

## 2023-03-01 PROCEDURE — 3078F DIAST BP <80 MM HG: CPT | Performed by: NURSE PRACTITIONER

## 2023-03-01 PROCEDURE — 1101F PT FALLS ASSESS-DOCD LE1/YR: CPT | Performed by: NURSE PRACTITIONER

## 2023-03-01 PROCEDURE — 1090F PRES/ABSN URINE INCON ASSESS: CPT | Performed by: NURSE PRACTITIONER

## 2023-03-01 PROCEDURE — G8427 DOCREV CUR MEDS BY ELIG CLIN: HCPCS | Performed by: NURSE PRACTITIONER

## 2023-03-01 PROCEDURE — 93000 ELECTROCARDIOGRAM COMPLETE: CPT | Performed by: NURSE PRACTITIONER

## 2023-03-01 PROCEDURE — G8432 DEP SCR NOT DOC, RNG: HCPCS | Performed by: NURSE PRACTITIONER

## 2023-03-01 PROCEDURE — G8399 PT W/DXA RESULTS DOCUMENT: HCPCS | Performed by: NURSE PRACTITIONER

## 2023-03-01 NOTE — PROGRESS NOTES
Identified pt with two pt identifiers(name and ). Reviewed record in preparation for visit and have obtained necessary documentation. Chief Complaint   Patient presents with    Hypertension     6 month follow up    Cholesterol Problem      Vitals:    23 0934   BP: 124/72   Pulse: 86   Resp: 20   Temp: 98.6 °F (37 °C)   TempSrc: Temporal   SpO2: 95%   Weight: 196 lb (88.9 kg)   Height: 5' 2.5\" (1.588 m)   PainSc:   0 - No pain       Medications reviewed/approved by provider. Health Maintenance Review: Patient reminded of \"due or due soon\" health maintenance. I have asked the patient to contact his/her primary care provider (PCP) for follow-up on his/her health maintenance. Coordination of Care Questionnaire:  :   1) Have you been to an emergency room, urgent care, or hospitalized since your last visit? If yes, where when, and reason for visit? yes  [facility]  22 shoulder pain [place]         2. Have seen or consulted any other health care provider since your last visit? If yes, where when, and reason for visit? Yes 22 shoulder pain [place]       Patient is accompanied by self I have received verbal consent from Varsha Ramirez to discuss any/all medical information while they are present in the room.

## 2023-03-27 ENCOUNTER — TELEPHONE (OUTPATIENT)
Dept: CARDIOLOGY CLINIC | Age: 73
End: 2023-03-27

## 2023-03-27 ENCOUNTER — OFFICE VISIT (OUTPATIENT)
Dept: ORTHOPEDIC SURGERY | Age: 73
End: 2023-03-27
Payer: MEDICARE

## 2023-03-27 VITALS — BODY MASS INDEX: 34.73 KG/M2 | WEIGHT: 196 LBS | HEIGHT: 63 IN

## 2023-03-27 DIAGNOSIS — Z98.890 STATUS POST ARTHROSCOPY OF SHOULDER: Primary | ICD-10-CM

## 2023-03-27 PROCEDURE — 99024 POSTOP FOLLOW-UP VISIT: CPT | Performed by: ORTHOPAEDIC SURGERY

## 2023-03-27 RX ORDER — METHYLPREDNISOLONE 4 MG/1
TABLET ORAL
Qty: 1 DOSE PACK | Refills: 0 | Status: SHIPPED | OUTPATIENT
Start: 2023-03-27

## 2023-03-27 NOTE — LETTER
3/27/2023    Patient: Angela Gomez   YOB: 1950   Date of Visit: 3/27/2023     Robert Ulrich MD  [facility] [id number]  Via Fax: [phone]    Dear Robert Ulrich MD,      Thank you for referring Ms. Farrukh Butcher to [facility] for evaluation. My notes for this consultation are attached. If you have questions, please do not hesitate to call me. I look forward to following your patient along with you.       Sincerely,    Tiara Ash, DO

## 2023-03-27 NOTE — PROGRESS NOTES
Mikki Perea (: 1950) is a 68 y.o. female, established patient, here for evaluation of the following chief complaint(s):  Shoulder Pain       ASSESSMENT/PLAN:  Below is the assessment and plan developed based on review of pertinent history, physical exam, labs, studies, and medications. Discussed with patient today. Based on her weakness and pain I do have concerns for recurrent rotator cuff tear. We discussed that this was a massive rotator cuff tear that she had repaired. If she has recurrence of her tear then I am not sure we will have great success with trying a revision rotator cuff repair. We will try anti-inflammatories and possibly corticosteroid injection if needed. We did discuss possibility of reverse total shoulder arthroplasty in the future. However, my hope is that this is more of a flareup of some bursitis and tendinitis. She will try Medrol Dosepak and we will plan to see her back on an as-needed basis. 1. Status post arthroscopy of shoulder      Return in about 2 months (around 2023). SUBJECTIVE/OBJECTIVE:  Mikki Perea (: 1950) is a 68 y.o. female. She presents today now 3 months status post right massive rotator cuff repair. She notes that she has been progressing in therapy and was doing very well with daily activities. She notes that a week ago she was lifting heavy papers off the ground and afterwards felt a sharp pain in the shoulder. Allergies   Allergen Reactions    Crestor [Rosuvastatin] Myalgia     \"Pain in the liver location. \"    Mold Other (comments)     Asthma symptoms    Pcn [Penicillins] Anaphylaxis    Ibuprofen Rash    Levofloxacin Nausea Only       Current Outpatient Medications   Medication Sig    methylPREDNISolone (Medrol, Gaudencio,) 4 mg tablet Use as directed    furosemide (LASIX) 20 mg tablet Take  by mouth daily.     amLODIPine (NORVASC) 2.5 mg tablet TAKE 1 TABLET TWICE A DAY    Nebulizer & Compressor machine 1 Each by Does Not Apply route four (4) times daily as needed for Wheezing or Shortness of Breath. Breztri Aerosphere 160-9-4.8 mcg/actuation HFAA Take 2 Puffs by inhalation two (2) times a day. cholecalciferol, vitamin D3, (VITAMIN D3 PO) Take  by mouth.    metoprolol tartrate (LOPRESSOR) 25 mg tablet Take 0.5 Tablets by mouth two (2) times a day. Reduced dose    synthroid 50 mcg tablet Take 25 mcg by mouth Daily (before breakfast). diclofenac (VOLTAREN) 1 % gel as needed. pilocarpine (SALAGEN) 5 mg tablet pilocarpine 5 mg tablet   Take 1 tablet 3 times a day by oral route. conjugated estrogens (Premarin) 0.625 mg/gram vaginal cream every Tuesday and Friday. aspirin delayed-release 81 mg tablet Take 1 Tab by mouth daily. (Patient taking differently: Take 81 mg by mouth every other day.)    clopidogrel (PLAVIX) 75 mg tab Take 1 Tab by mouth daily. (Patient taking differently: Take 75 mg by mouth. EVERY 4 DAYS)    diphenhydrAMINE (BENADRYL) 25 mg capsule Take 25 mg by mouth every six (6) hours as needed. montelukast (SINGULAIR) 10 mg tablet Take 10 mg by mouth daily. albuterol (PROVENTIL VENTOLIN) 2.5 mg /3 mL (0.083 %) nebulizer solution 2.5 mg by Nebulization route every four (4) hours as needed for Wheezing. cyclobenzaprine (FLEXERIL) 10 mg tablet Take 10 mg by mouth three (3) times daily as needed for Muscle Spasm(s). zolpidem (AMBIEN) 10 mg tablet Take 5 mg by mouth nightly as needed for Sleep. Indications: Takes 5mg or 10mg as needed     No current facility-administered medications for this visit.        Social History     Socioeconomic History    Marital status:      Spouse name: Not on file    Number of children: Not on file    Years of education: Not on file    Highest education level: Not on file   Occupational History    Not on file   Tobacco Use    Smoking status: Former     Packs/day: 2.00     Years: 45.00     Pack years: 90.00     Types: Cigarettes     Quit date: 12/7/2013     Years since quittin.3    Smokeless tobacco: Never   Vaping Use    Vaping Use: Never used   Substance and Sexual Activity    Alcohol use: Yes     Alcohol/week: 5.0 standard drinks     Types: 5 Glasses of wine per week     Comment: 5 glasses a week    Drug use: Not Currently    Sexual activity: Yes     Partners: Male   Other Topics Concern    Not on file   Social History Narrative    Not on file     Social Determinants of Health     Financial Resource Strain: Not on file   Food Insecurity: Not on file   Transportation Needs: Not on file   Physical Activity: Not on file   Stress: Not on file   Social Connections: Not on file   Intimate Partner Violence: Not on file   Housing Stability: Not on file       Past Surgical History:   Procedure Laterality Date    HX APPENDECTOMY  [address] [facility] [address]    [address]      [facility]  Cardiology    HX HYSTERECTOMY      HX OOPHORECTOMY      HX ORTHOPAEDIC  ,     back surgery; wrist surgery     HX ORTHOPAEDIC      anterior cervical fusion C4-5    HX ORTHOPAEDIC  1984    cervical shaved C6       Family History   Problem Relation Age of Onset    Stroke Mother     Hypertension Mother     Dementia Mother     High Cholesterol Mother     Heart Disease Father     Hypertension Father     Kidney Disease Father     High Cholesterol Father     Hypertension Sister     Stroke Sister         Sjogren's        OB History          2    Para   2    Term   2            AB        Living             SAB        IAB        Ectopic        Molar        Multiple        Live Births   2                   REVIEW OF SYSTEMS:  ROS    Positive for: Musculoskeletal  Last edited by Cheri Pérez on 3/27/2023 10:43 AM.        Patient denies any recent fever, chills, nausea, vomiting, chest pain, or shortness of breath. Vitals:  Ht 5' 2.5\" (1.588 m)   Wt 196 lb (88.9 kg)   BMI 35.28 kg/m²    Body mass index is 35.28 kg/m². PHYSICAL EXAM:  General exam: Patient is awake, alert, and oriented x3. Well-appearing. No acute distress. Ambulates with a normal gait. Heart/Lungs:  no respiratory distress, palpable pulses    Right shoulder: Neurovascular and sensory intact. There is tenderness to palpation at the anterior lateral shoulder. Limited passive range of motion is noted. There is limited active range of motion to 90 degrees of forward flexion and abduction. Pain is noted with impingement testing including Boyd exam.  Pain and weakness 4/5 is noted with rotator cuff strength testing including resisted abduction and resisted external rotation. Normal stability. There is some tenderness palpation at the [facility] joint and pain with crossarm exam.        IMAGING:    XR Results (most recent):  Results from Hospital Encounter encounter on 01/30/23    XR ABD ACUTE W 1 V CHEST    Narrative  EXAM: XR ABD ACUTE W 1 V CHEST    INDICATION: Drug induced constipation    COMPARISON: 12/5/2022. FINDINGS: The upright chest radiograph demonstrates clear lungs and normal  cardiac and mediastinal contours. There is no pleural effusion or free air under  the diaphragm. Supine and upright views of the abdomen demonstrate a nonobstructive bowel gas  pattern. The amount of retained stool is increased. There is no free  intraperitoneal air. No soft tissue masses or pathologic calcifications are  identified. The bones are within normal limits. Impression  No acute cardiopulmonary process seen  Constipation      Results from Hospital Encounter encounter on 12/05/22    XR CHEST PA LAT    Narrative  Clinical history: Cough  INDICATION:   Cough  COMPARISON: 10/6/2022    FINDINGS:  PA and lateral views of the chest are obtained. The cardiopericardial silhouette is within normal limits. There is no pleural  effusion, pneumothorax or focal consolidation present. Impression  No acute intrathoracic disease.       Results from [facility] encounter on 10/25/22    XR SHOULDER RT AP/LAT MIN 2 V    Narrative  EXAM: XR SHOULDER RT AP/LAT MIN 2 V    INDICATION: .  Right shoulder pain    COMPARISON: None. FINDINGS: Three views of the right shoulder demonstrate no fracture, dislocation  or other acute abnormality. There is moderate degenerative change, particularly  at the acromioclavicular joint, with hypertrophic bone and joint space narrowing  with articular sclerosis. Impression  1. No acute bony abnormality. 2. Degenerative change greatest at the acromioclavicular joint. .         Orders Placed This Encounter    methylPREDNISolone (Medrol, Gaudencio,) 4 mg tablet     Sig: Use as directed     Dispense:  1 Dose Pack     Refill:  0              An electronic signature was used to authenticate this note.   -- Vikram Foil, DO

## 2023-03-27 NOTE — TELEPHONE ENCOUNTER
Patient is scheduled for 3/30. Patient stated she has a family emergency and will be out of state until [facility] April. She said she has been decreasing her BB and her blood pressure has been doing good. Patient stated she will call back once she is back in town.

## 2023-04-17 ENCOUNTER — OFFICE VISIT (OUTPATIENT)
Dept: ORTHOPEDIC SURGERY | Age: 73
End: 2023-04-17
Payer: MEDICARE

## 2023-04-17 VITALS — BODY MASS INDEX: 33.66 KG/M2 | HEIGHT: 63 IN | WEIGHT: 190 LBS

## 2023-04-17 DIAGNOSIS — Z98.890 STATUS POST ARTHROSCOPY OF SHOULDER: Primary | ICD-10-CM

## 2023-04-17 PROCEDURE — G8417 CALC BMI ABV UP PARAM F/U: HCPCS | Performed by: ORTHOPAEDIC SURGERY

## 2023-04-17 PROCEDURE — 1123F ACP DISCUSS/DSCN MKR DOCD: CPT | Performed by: ORTHOPAEDIC SURGERY

## 2023-04-17 PROCEDURE — G8536 NO DOC ELDER MAL SCRN: HCPCS | Performed by: ORTHOPAEDIC SURGERY

## 2023-04-17 PROCEDURE — 99214 OFFICE O/P EST MOD 30 MIN: CPT | Performed by: ORTHOPAEDIC SURGERY

## 2023-04-17 PROCEDURE — G9899 SCRN MAM PERF RSLTS DOC: HCPCS | Performed by: ORTHOPAEDIC SURGERY

## 2023-04-17 PROCEDURE — G8399 PT W/DXA RESULTS DOCUMENT: HCPCS | Performed by: ORTHOPAEDIC SURGERY

## 2023-04-17 PROCEDURE — G8427 DOCREV CUR MEDS BY ELIG CLIN: HCPCS | Performed by: ORTHOPAEDIC SURGERY

## 2023-04-17 PROCEDURE — 3017F COLORECTAL CA SCREEN DOC REV: CPT | Performed by: ORTHOPAEDIC SURGERY

## 2023-04-17 PROCEDURE — 1101F PT FALLS ASSESS-DOCD LE1/YR: CPT | Performed by: ORTHOPAEDIC SURGERY

## 2023-04-17 PROCEDURE — G8432 DEP SCR NOT DOC, RNG: HCPCS | Performed by: ORTHOPAEDIC SURGERY

## 2023-04-17 PROCEDURE — 1090F PRES/ABSN URINE INCON ASSESS: CPT | Performed by: ORTHOPAEDIC SURGERY

## 2023-04-17 PROCEDURE — 20610 DRAIN/INJ JOINT/BURSA W/O US: CPT | Performed by: ORTHOPAEDIC SURGERY

## 2023-04-17 RX ORDER — BUPIVACAINE HYDROCHLORIDE 5 MG/ML
3 INJECTION, SOLUTION EPIDURAL; INTRACAUDAL ONCE
Status: COMPLETED | OUTPATIENT
Start: 2023-04-17 | End: 2023-04-17

## 2023-04-17 RX ORDER — METHYLPREDNISOLONE ACETATE 80 MG/ML
80 INJECTION, SUSPENSION INTRA-ARTICULAR; INTRALESIONAL; INTRAMUSCULAR; SOFT TISSUE ONCE
Status: COMPLETED | OUTPATIENT
Start: 2023-04-17 | End: 2023-04-17

## 2023-04-17 RX ORDER — SEMAGLUTIDE 1.34 MG/ML
INJECTION, SOLUTION SUBCUTANEOUS
COMMUNITY

## 2023-04-17 RX ADMIN — BUPIVACAINE HYDROCHLORIDE 15 MG: 5 INJECTION, SOLUTION EPIDURAL; INTRACAUDAL at 11:50

## 2023-04-17 RX ADMIN — METHYLPREDNISOLONE ACETATE 80 MG: 80 INJECTION, SUSPENSION INTRA-ARTICULAR; INTRALESIONAL; INTRAMUSCULAR; SOFT TISSUE at 11:50

## 2023-04-17 NOTE — PROGRESS NOTES
Micaela Barragan (: 1950) is a 68 y.o. female, established patient, here for evaluation of the following chief complaint(s):  Shoulder Pain       ASSESSMENT/PLAN:  Below is the assessment and plan developed based on review of pertinent history, physical exam, labs, studies, and medications. Based on her injury mechanism and continued weakness in the right shoulder after she was initially doing well I do believe that she has a recurrent massive rotator cuff tear. We discussed the signs of early rotator cuff arthropathy and treatment options. Do not feel that she would benefit from a revision rotator cuff repair. We discussed possibility of reverse total shoulder arthroplasty if she has no improvement over the next few months to years. However, she would like to hold off on any surgical treatment. She elected to try corticosteroid injection for the right shoulder. We discussed the risks and benefits of injection and informed consent was obtained. After a sterile preparation, 3 cc of bupivicaine and 80 mg of Depo-Medrol were injected into the right shoulder. The patient tolerated the procedure well and there were no complications. Post injection pain, blood sugar elevation, skin discoloration, fatty atrophy and the signs of infection were discussed in detail. The patient was instructed to contact us if there were any questions or concerns prior to their follow up appointment. 1. Status post arthroscopy of shoulder  -     XR SHOULDER RT AP/LAT MIN 2 V; Future      Return if symptoms worsen or fail to improve. SUBJECTIVE/OBJECTIVE:  Micaela Barragan (: 1950) is a 68 y.o. female. She presents today for follow-up of her right shoulder. She notes continued weakness and difficulties. She underwent a massive rotator cuff repair about 4 months ago and has had a fall since then was felt a pop in her shoulder.   She has noticed a significant decline in physical therapy since this injury. Allergies   Allergen Reactions    Crestor [Rosuvastatin] Myalgia     \"Pain in the liver location. \"    Mold Other (comments)     Asthma symptoms    Pcn [Penicillins] Anaphylaxis    Ibuprofen Rash    Levofloxacin Nausea Only       Current Outpatient Medications   Medication Sig    semaglutide (Ozempic) 0.25 mg or 0.5 mg/dose (2 mg/1.5 ml) subq pen by SubCUTAneous route every seven (7) days. furosemide (LASIX) 20 mg tablet Take  by mouth daily. amLODIPine (NORVASC) 2.5 mg tablet TAKE 1 TABLET TWICE A DAY    Nebulizer & Compressor machine 1 Each by Does Not Apply route four (4) times daily as needed for Wheezing or Shortness of Breath. Breztri Aerosphere 160-9-4.8 mcg/actuation HFAA Take 2 Puffs by inhalation two (2) times a day. cholecalciferol, vitamin D3, (VITAMIN D3 PO) Take  by mouth.    metoprolol tartrate (LOPRESSOR) 25 mg tablet Take 0.5 Tablets by mouth two (2) times a day. Reduced dose    synthroid 50 mcg tablet Take 0.5 Tablets by mouth Daily (before breakfast). diclofenac (VOLTAREN) 1 % gel as needed. pilocarpine (SALAGEN) 5 mg tablet pilocarpine 5 mg tablet   Take 1 tablet 3 times a day by oral route. conjugated estrogens (Premarin) 0.625 mg/gram vaginal cream every Tuesday and Friday. aspirin delayed-release 81 mg tablet Take 1 Tab by mouth daily. (Patient taking differently: Take 1 Tablet by mouth every other day.)    diphenhydrAMINE (BENADRYL) 25 mg capsule Take 1 Capsule by mouth every six (6) hours as needed. montelukast (SINGULAIR) 10 mg tablet Take 1 Tablet by mouth daily. albuterol (PROVENTIL VENTOLIN) 2.5 mg /3 mL (0.083 %) nebulizer solution 3 mL by Nebulization route every four (4) hours as needed for Wheezing. cyclobenzaprine (FLEXERIL) 10 mg tablet Take 1 Tablet by mouth three (3) times daily as needed for Muscle Spasm(s). zolpidem (AMBIEN) 10 mg tablet Take 0.5 Tablets by mouth nightly as needed for Sleep.  Indications: Takes 5mg or 10mg as needed     No current facility-administered medications for this visit. Social History     Socioeconomic History    Marital status:      Spouse name: Not on file    Number of children: Not on file    Years of education: Not on file    Highest education level: Not on file   Occupational History    Not on file   Tobacco Use    Smoking status: Former     Packs/day: 2.00     Years: 45.00     Pack years: 90.00     Types: Cigarettes     Quit date: 2013     Years since quittin.3    Smokeless tobacco: Never   Vaping Use    Vaping Use: Never used   Substance and Sexual Activity    Alcohol use:  Yes     Alcohol/week: 5.0 standard drinks     Types: 5 Glasses of wine per week     Comment: 5 glasses a week    Drug use: Not Currently    Sexual activity: Yes     Partners: Male   Other Topics Concern    Not on file   Social History Narrative    Not on file     Social Determinants of Health     Financial Resource Strain: Not on file   Food Insecurity: Not on file   Transportation Needs: Not on file   Physical Activity: Not on file   Stress: Not on file   Social Connections: Not on file   Intimate Partner Violence: Not on file   Housing Stability: Not on file       Past Surgical History:   Procedure Laterality Date    HX APPENDECTOMY      [address] [place] [address],     [address]      [facility]  Cardiology    HX HYSTERECTOMY      HX OOPHORECTOMY      HX ORTHOPAEDIC  ,     back surgery; wrist surgery     HX ORTHOPAEDIC  1987    anterior cervical fusion C4-5    HX ORTHOPAEDIC  1984    cervical shaved C6       Family History   Problem Relation Age of Onset    Stroke Mother     Hypertension Mother     Dementia Mother     High Cholesterol Mother     Heart Disease Father     Hypertension Father     Kidney Disease Father     High Cholesterol Father     Hypertension Sister     Stroke Sister         Sjogren's        OB History          2    Para   2    Term   2         AB        Living             SAB        IAB        Ectopic        Molar        Multiple        Live Births   2                   REVIEW OF SYSTEMS:  ROS    Positive for: Musculoskeletal  Last edited by Troy Jaime on 2023 10:12 AM.        Patient denies any recent fever, chills, nausea, vomiting, chest pain, or shortness of breath. Vitals:  Ht 5' 2.5\" (1.588 m)   Wt 190 lb (86.2 kg)   BMI 34.20 kg/m²    Body mass index is 34.2 kg/m². PHYSICAL EXAM:  General exam: Patient is awake, alert, and oriented x3. Well-appearing. No acute distress. Ambulates with a normal gait. Heart/Lungs:  no respiratory distress, palpable pulses    Right shoulder: Neurovascular and sensory intact. There is decreased range of motion in all planes on active and passive exam.  There is crepitus with range of motion of the shoulder. There is pain with impingement testing including Boyd exam.  There is weakness noted with resisted abduction and resisted external rotation on exam.  Normal stability is noted. IMAGING:    XR Results (most recent):  Results from Appointment encounter on 23    XR SHOULDER RT AP/LAT MIN 2 V    Narrative  X-rays of the right shoulder 3 views done today show slight glenohumeral joint space narrowing. No signs of obvious acute fracture although there could be some cortical disruption in the area of the scapula seen on 1 view. Results from [place] encounter on 23    XR ABD ACUTE W 1 V CHEST    Narrative  EXAM: XR ABD ACUTE W 1 V CHEST    INDICATION: Drug induced constipation    COMPARISON: 2022. FINDINGS: The upright chest radiograph demonstrates clear lungs and normal  cardiac and mediastinal contours. There is no pleural effusion or free air under  the diaphragm. Supine and upright views of the abdomen demonstrate a nonobstructive bowel gas  pattern. The amount of retained stool is increased.  There is no free  intraperitoneal air. No soft tissue masses or pathologic calcifications are  identified. The bones are within normal limits. Impression  No acute cardiopulmonary process seen  Constipation      Results from Hospital Encounter encounter on 12/05/22    XR CHEST PA LAT    Narrative  Clinical history: Cough  INDICATION:   Cough  COMPARISON: 10/6/2022    FINDINGS:  PA and lateral views of the chest are obtained. The cardiopericardial silhouette is within normal limits. There is no pleural  effusion, pneumothorax or focal consolidation present. Impression  No acute intrathoracic disease. Orders Placed This Encounter    XR SHOULDER RT AP/LAT MIN 2 V     Standing Status:   Future     Number of Occurrences:   1     Standing Expiration Date:   10/17/2023              An electronic signature was used to authenticate this note.   -- Christophe Bermudez, DO

## 2023-04-22 DIAGNOSIS — Z78.0 ASYMPTOMATIC MENOPAUSAL STATE: Primary | ICD-10-CM

## 2023-04-22 DIAGNOSIS — Z12.31 VISIT FOR SCREENING MAMMOGRAM: Primary | ICD-10-CM

## 2023-04-23 DIAGNOSIS — Z12.31 VISIT FOR SCREENING MAMMOGRAM: Primary | ICD-10-CM

## 2023-04-23 DIAGNOSIS — Z78.0 ASYMPTOMATIC MENOPAUSAL STATE: Primary | ICD-10-CM

## 2023-04-24 DIAGNOSIS — Z78.0 ASYMPTOMATIC MENOPAUSAL STATE: Primary | ICD-10-CM

## 2023-04-24 DIAGNOSIS — Z12.31 VISIT FOR SCREENING MAMMOGRAM: Primary | ICD-10-CM

## 2023-04-28 ENCOUNTER — HOSPITAL ENCOUNTER (OUTPATIENT)
Dept: MAMMOGRAPHY | Age: 73
End: 2023-04-28
Attending: NURSE PRACTITIONER
Payer: MEDICARE

## 2023-04-28 ENCOUNTER — HOSPITAL ENCOUNTER (OUTPATIENT)
Dept: GENERAL RADIOLOGY | Age: 73
End: 2023-04-28
Attending: NURSE PRACTITIONER
Payer: MEDICARE

## 2023-04-28 VITALS — WEIGHT: 184 LBS | BODY MASS INDEX: 33.12 KG/M2

## 2023-04-28 DIAGNOSIS — Z78.0 ASYMPTOMATIC MENOPAUSAL STATE: ICD-10-CM

## 2023-04-28 DIAGNOSIS — Z12.31 VISIT FOR SCREENING MAMMOGRAM: ICD-10-CM

## 2023-04-28 PROCEDURE — 77080 DXA BONE DENSITY AXIAL: CPT

## 2023-04-28 PROCEDURE — 77063 BREAST TOMOSYNTHESIS BI: CPT

## 2023-05-04 RX ORDER — AMLODIPINE BESYLATE 2.5 MG/1
TABLET ORAL
Qty: 180 TABLET | Refills: 0 | Status: SHIPPED | OUTPATIENT
Start: 2023-05-04

## 2023-08-17 RX ORDER — AMLODIPINE BESYLATE 2.5 MG/1
TABLET ORAL
Qty: 180 TABLET | Refills: 0 | Status: SHIPPED | OUTPATIENT
Start: 2023-08-17

## 2023-09-11 ENCOUNTER — TRANSCRIBE ORDERS (OUTPATIENT)
Facility: HOSPITAL | Age: 73
End: 2023-09-11

## 2023-09-11 DIAGNOSIS — M54.50 LOW BACK PAIN, UNSPECIFIED BACK PAIN LATERALITY, UNSPECIFIED CHRONICITY, UNSPECIFIED WHETHER SCIATICA PRESENT: Primary | ICD-10-CM

## 2023-09-22 ENCOUNTER — TRANSCRIBE ORDERS (OUTPATIENT)
Facility: HOSPITAL | Age: 73
End: 2023-09-22

## 2023-09-22 ENCOUNTER — HOSPITAL ENCOUNTER (OUTPATIENT)
Facility: HOSPITAL | Age: 73
End: 2023-09-22
Payer: MEDICARE

## 2023-09-22 DIAGNOSIS — R07.1 CHEST PAIN ON BREATHING: Primary | ICD-10-CM

## 2023-09-22 DIAGNOSIS — R07.1 CHEST PAIN ON BREATHING: ICD-10-CM

## 2023-09-22 PROCEDURE — 71046 X-RAY EXAM CHEST 2 VIEWS: CPT

## 2023-10-02 ENCOUNTER — TRANSCRIBE ORDERS (OUTPATIENT)
Facility: HOSPITAL | Age: 73
End: 2023-10-02

## 2023-10-02 ENCOUNTER — HOSPITAL ENCOUNTER (OUTPATIENT)
Facility: HOSPITAL | Age: 73
Discharge: HOME | End: 2023-10-05
Payer: MEDICARE

## 2023-10-02 DIAGNOSIS — M25.561 RIGHT KNEE PAIN, UNSPECIFIED CHRONICITY: ICD-10-CM

## 2023-10-02 DIAGNOSIS — M25.561 RIGHT KNEE PAIN, UNSPECIFIED CHRONICITY: Primary | ICD-10-CM

## 2023-10-02 PROCEDURE — 73562 X-RAY EXAM OF KNEE 3: CPT

## 2023-10-06 ENCOUNTER — TELEPHONE (OUTPATIENT)
Age: 73
End: 2023-10-06

## 2023-10-06 NOTE — TELEPHONE ENCOUNTER
Verified patient with two patient identifiers     Patient was scheduled on 10/13. Patient is asking to schedule her 6 month follow up with Dr. Enrique Layne. Patient is looking for anything after 9am due to distance. Please call patient to schedule.     [phone]

## 2023-10-26 ENCOUNTER — HOSPITAL ENCOUNTER (OUTPATIENT)
Facility: HOSPITAL | Age: 73
Discharge: HOME | End: 2023-10-26
Payer: MEDICARE

## 2023-10-26 DIAGNOSIS — R05.1 ACUTE COUGH: ICD-10-CM

## 2023-10-26 PROCEDURE — 71046 X-RAY EXAM CHEST 2 VIEWS: CPT

## 2023-11-01 ENCOUNTER — HOSPITAL ENCOUNTER (INPATIENT)
Facility: HOSPITAL | Age: 73
LOS: 1 days | Discharge: ANOTHER HOSPITAL | DRG: 871 | End: 2023-11-02
Attending: EMERGENCY MEDICINE | Admitting: STUDENT IN AN ORGANIZED HEALTH CARE EDUCATION/TRAINING PROGRAM
Payer: MEDICARE

## 2023-11-01 ENCOUNTER — APPOINTMENT (OUTPATIENT)
Facility: HOSPITAL | Age: 73
DRG: 871 | End: 2023-11-01
Payer: MEDICARE

## 2023-11-01 DIAGNOSIS — J96.01 ACUTE RESPIRATORY FAILURE WITH HYPOXIA (HCC): ICD-10-CM

## 2023-11-01 DIAGNOSIS — I10 PRIMARY HYPERTENSION: ICD-10-CM

## 2023-11-01 DIAGNOSIS — J18.9 MULTIFOCAL PNEUMONIA: ICD-10-CM

## 2023-11-01 DIAGNOSIS — J96.01 ACUTE RESPIRATORY FAILURE WITH HYPOXEMIA (HCC): Primary | ICD-10-CM

## 2023-11-01 DIAGNOSIS — R07.89 OTHER CHEST PAIN: ICD-10-CM

## 2023-11-01 PROBLEM — E03.9 PRIMARY HYPOTHYROIDISM: Status: ACTIVE | Noted: 2023-11-01

## 2023-11-01 PROBLEM — M81.0 OSTEOPOROSIS: Status: ACTIVE | Noted: 2023-11-01

## 2023-11-01 PROBLEM — N17.9 AKI (ACUTE KIDNEY INJURY) (HCC): Status: ACTIVE | Noted: 2023-11-01

## 2023-11-01 PROBLEM — A41.9 SEPSIS (HCC): Status: ACTIVE | Noted: 2023-11-01

## 2023-11-01 PROBLEM — N39.0 RECURRENT URINARY TRACT INFECTION: Status: ACTIVE | Noted: 2023-10-23

## 2023-11-01 PROBLEM — J44.9 COPD (CHRONIC OBSTRUCTIVE PULMONARY DISEASE) (HCC): Status: ACTIVE | Noted: 2017-06-18

## 2023-11-01 PROBLEM — I50.32 CHRONIC HEART FAILURE WITH PRESERVED EJECTION FRACTION (HCC): Status: ACTIVE | Noted: 2023-11-01

## 2023-11-01 PROBLEM — E66.9 OBESITY: Status: ACTIVE | Noted: 2023-11-01

## 2023-11-01 PROBLEM — R15.9 INCONTINENCE OF FECES: Status: ACTIVE | Noted: 2023-10-23

## 2023-11-01 PROBLEM — E78.5 HYPERLIPIDEMIA: Status: ACTIVE | Noted: 2023-11-01

## 2023-11-01 PROBLEM — R79.89 ELEVATED TROPONIN: Status: ACTIVE | Noted: 2023-11-01

## 2023-11-01 LAB
ALBUMIN SERPL-MCNC: 2.6 G/DL (ref 3.5–5)
ALBUMIN/GLOB SERPL: 0.7 (ref 1.1–2.2)
ALP SERPL-CCNC: 100 U/L (ref 45–117)
ALT SERPL-CCNC: 26 U/L (ref 12–78)
ANION GAP SERPL CALC-SCNC: 12 MMOL/L (ref 5–15)
ARTERIAL PATENCY WRIST A: YES
AST SERPL-CCNC: 28 U/L (ref 15–37)
BASE EXCESS BLDA CALC-SCNC: 2.2 MMOL/L
BASOPHILS # BLD: 0 K/UL (ref 0–0.1)
BASOPHILS NFR BLD: 0 % (ref 0–1)
BDY SITE: ABNORMAL
BILIRUB SERPL-MCNC: 0.8 MG/DL (ref 0.2–1)
BUN SERPL-MCNC: 25 MG/DL (ref 6–20)
BUN/CREAT SERPL: 15 (ref 12–20)
CALCIUM SERPL-MCNC: 9 MG/DL (ref 8.5–10.1)
CHLORIDE SERPL-SCNC: 100 MMOL/L (ref 97–108)
CO2 SERPL-SCNC: 24 MMOL/L (ref 21–32)
CREAT SERPL-MCNC: 1.66 MG/DL (ref 0.55–1.02)
DIFFERENTIAL METHOD BLD: ABNORMAL
EOSINOPHIL # BLD: 0 K/UL (ref 0–0.4)
EOSINOPHIL NFR BLD: 0 % (ref 0–7)
ERYTHROCYTE [DISTWIDTH] IN BLOOD BY AUTOMATED COUNT: 15.2 % (ref 11.5–14.5)
FLUAV RNA SPEC QL NAA+PROBE: NOT DETECTED
FLUBV RNA SPEC QL NAA+PROBE: NOT DETECTED
GAS FLOW.O2 O2 DELIVERY SYS: 4 L/MIN
GLOBULIN SER CALC-MCNC: 3.8 G/DL (ref 2–4)
GLUCOSE SERPL-MCNC: 130 MG/DL (ref 65–100)
HCO3 BLDA-SCNC: 24 MMOL/L (ref 22–26)
HCT VFR BLD AUTO: 27.9 % (ref 35–47)
HGB BLD-MCNC: 9 G/DL (ref 11.5–16)
IMM GRANULOCYTES # BLD AUTO: 0 K/UL (ref 0–0.04)
IMM GRANULOCYTES NFR BLD AUTO: 0 % (ref 0–0.5)
LYMPHOCYTES # BLD: 0.4 K/UL (ref 0.8–3.5)
LYMPHOCYTES NFR BLD: 5 % (ref 12–49)
MCH RBC QN AUTO: 28.4 PG (ref 26–34)
MCHC RBC AUTO-ENTMCNC: 32.3 G/DL (ref 30–36.5)
MCV RBC AUTO: 88 FL (ref 80–99)
MONOCYTES # BLD: 0.4 K/UL (ref 0–1)
MONOCYTES NFR BLD: 5 % (ref 5–13)
NEUTS BAND NFR BLD MANUAL: 3 %
NEUTS SEG # BLD: 6.9 K/UL (ref 1.8–8)
NEUTS SEG NFR BLD: 87 % (ref 32–75)
NRBC # BLD: 0 K/UL (ref 0–0.01)
NRBC BLD-RTO: 0 PER 100 WBC
NT PRO BNP: 9462 PG/ML (ref 0–125)
PCO2 BLDA: 30 MMHG (ref 35–45)
PH BLDA: 7.52 (ref 7.35–7.45)
PLATELET # BLD AUTO: 166 K/UL (ref 150–400)
PMV BLD AUTO: 10.9 FL (ref 8.9–12.9)
PO2 BLDA: 54 MMHG (ref 80–100)
POTASSIUM SERPL-SCNC: 4.1 MMOL/L (ref 3.5–5.1)
PROT SERPL-MCNC: 6.4 G/DL (ref 6.4–8.2)
RBC # BLD AUTO: 3.17 M/UL (ref 3.8–5.2)
RBC MORPH BLD: ABNORMAL
SAO2 % BLD: 92 % (ref 92–97)
SAO2% DEVICE SAO2% SENSOR NAME: ABNORMAL
SARS-COV-2 RNA RESP QL NAA+PROBE: NOT DETECTED
SODIUM SERPL-SCNC: 136 MMOL/L (ref 136–145)
SPECIMEN SITE: ABNORMAL
TROPONIN I SERPL HS-MCNC: 159 NG/L (ref 0–51)
TROPONIN I SERPL HS-MCNC: 176 NG/L (ref 0–51)
WBC # BLD AUTO: 7.7 K/UL (ref 3.6–11)

## 2023-11-01 PROCEDURE — 87186 SC STD MICRODIL/AGAR DIL: CPT

## 2023-11-01 PROCEDURE — 1100000000 HC RM PRIVATE

## 2023-11-01 PROCEDURE — 87154 CUL TYP ID BLD PTHGN 6+ TRGT: CPT

## 2023-11-01 PROCEDURE — 36600 WITHDRAWAL OF ARTERIAL BLOOD: CPT

## 2023-11-01 PROCEDURE — 36415 COLL VENOUS BLD VENIPUNCTURE: CPT

## 2023-11-01 PROCEDURE — 87636 SARSCOV2 & INF A&B AMP PRB: CPT

## 2023-11-01 PROCEDURE — 2580000003 HC RX 258: Performed by: EMERGENCY MEDICINE

## 2023-11-01 PROCEDURE — 85025 COMPLETE CBC W/AUTO DIFF WBC: CPT

## 2023-11-01 PROCEDURE — 1100000003 HC PRIVATE W/ TELEMETRY

## 2023-11-01 PROCEDURE — 80053 COMPREHEN METABOLIC PANEL: CPT

## 2023-11-01 PROCEDURE — 96367 TX/PROPH/DG ADDL SEQ IV INF: CPT

## 2023-11-01 PROCEDURE — 84145 PROCALCITONIN (PCT): CPT

## 2023-11-01 PROCEDURE — 93005 ELECTROCARDIOGRAM TRACING: CPT | Performed by: EMERGENCY MEDICINE

## 2023-11-01 PROCEDURE — 84484 ASSAY OF TROPONIN QUANT: CPT

## 2023-11-01 PROCEDURE — 96365 THER/PROPH/DIAG IV INF INIT: CPT

## 2023-11-01 PROCEDURE — 6360000002 HC RX W HCPCS: Performed by: EMERGENCY MEDICINE

## 2023-11-01 PROCEDURE — 99285 EMERGENCY DEPT VISIT HI MDM: CPT

## 2023-11-01 PROCEDURE — 87040 BLOOD CULTURE FOR BACTERIA: CPT

## 2023-11-01 PROCEDURE — 87077 CULTURE AEROBIC IDENTIFY: CPT

## 2023-11-01 PROCEDURE — 83880 ASSAY OF NATRIURETIC PEPTIDE: CPT

## 2023-11-01 PROCEDURE — 94640 AIRWAY INHALATION TREATMENT: CPT

## 2023-11-01 PROCEDURE — 6360000004 HC RX CONTRAST MEDICATION: Performed by: EMERGENCY MEDICINE

## 2023-11-01 PROCEDURE — 6370000000 HC RX 637 (ALT 250 FOR IP): Performed by: EMERGENCY MEDICINE

## 2023-11-01 PROCEDURE — 82803 BLOOD GASES ANY COMBINATION: CPT

## 2023-11-01 PROCEDURE — 71275 CT ANGIOGRAPHY CHEST: CPT

## 2023-11-01 RX ORDER — PROCHLORPERAZINE EDISYLATE 5 MG/ML
10 INJECTION INTRAMUSCULAR; INTRAVENOUS EVERY 6 HOURS PRN
Status: DISCONTINUED | OUTPATIENT
Start: 2023-11-01 | End: 2023-11-02 | Stop reason: HOSPADM

## 2023-11-01 RX ORDER — AMLODIPINE BESYLATE 2.5 MG/1
2.5 TABLET ORAL 2 TIMES DAILY
Status: DISCONTINUED | OUTPATIENT
Start: 2023-11-02 | End: 2023-11-02

## 2023-11-01 RX ORDER — LEVOTHYROXINE SODIUM 0.03 MG/1
25 TABLET ORAL
Status: DISCONTINUED | OUTPATIENT
Start: 2023-11-02 | End: 2023-11-02 | Stop reason: HOSPADM

## 2023-11-01 RX ORDER — MAGNESIUM SULFATE IN WATER 40 MG/ML
2000 INJECTION, SOLUTION INTRAVENOUS PRN
Status: DISCONTINUED | OUTPATIENT
Start: 2023-11-01 | End: 2023-11-02 | Stop reason: HOSPADM

## 2023-11-01 RX ORDER — IPRATROPIUM BROMIDE AND ALBUTEROL SULFATE 2.5; .5 MG/3ML; MG/3ML
1 SOLUTION RESPIRATORY (INHALATION) EVERY 4 HOURS PRN
Status: DISCONTINUED | OUTPATIENT
Start: 2023-11-01 | End: 2023-11-02 | Stop reason: HOSPADM

## 2023-11-01 RX ORDER — SODIUM CHLORIDE 0.9 % (FLUSH) 0.9 %
5-40 SYRINGE (ML) INJECTION PRN
Status: DISCONTINUED | OUTPATIENT
Start: 2023-11-01 | End: 2023-11-02 | Stop reason: HOSPADM

## 2023-11-01 RX ORDER — HYDROMORPHONE HYDROCHLORIDE 2 MG/1
2 TABLET ORAL EVERY 8 HOURS PRN
Status: ON HOLD | COMMUNITY
Start: 2023-10-02

## 2023-11-01 RX ORDER — FUROSEMIDE 20 MG/1
20 TABLET ORAL DAILY
Status: DISCONTINUED | OUTPATIENT
Start: 2023-11-02 | End: 2023-11-02 | Stop reason: HOSPADM

## 2023-11-01 RX ORDER — NITROFURANTOIN 25; 75 MG/1; MG/1
1 CAPSULE ORAL 2 TIMES DAILY
Qty: 12 CAPSULE | Refills: 0 | Status: ON HOLD | COMMUNITY
Start: 2023-10-27 | End: 2023-11-02

## 2023-11-01 RX ORDER — ACETAMINOPHEN 650 MG/1
650 SUPPOSITORY RECTAL EVERY 6 HOURS PRN
Status: DISCONTINUED | OUTPATIENT
Start: 2023-11-01 | End: 2023-11-02 | Stop reason: HOSPADM

## 2023-11-01 RX ORDER — HEPARIN SODIUM 5000 [USP'U]/ML
5000 INJECTION, SOLUTION INTRAVENOUS; SUBCUTANEOUS EVERY 8 HOURS SCHEDULED
Status: DISCONTINUED | OUTPATIENT
Start: 2023-11-02 | End: 2023-11-02 | Stop reason: HOSPADM

## 2023-11-01 RX ORDER — BENZONATATE 100 MG/1
100 CAPSULE ORAL 3 TIMES DAILY PRN
Status: DISCONTINUED | OUTPATIENT
Start: 2023-11-01 | End: 2023-11-02 | Stop reason: HOSPADM

## 2023-11-01 RX ORDER — AZITHROMYCIN 250 MG/1
500 TABLET, FILM COATED ORAL EVERY 24 HOURS
Status: DISCONTINUED | OUTPATIENT
Start: 2023-11-02 | End: 2023-11-02

## 2023-11-01 RX ORDER — SODIUM CHLORIDE 0.9 % (FLUSH) 0.9 %
5-40 SYRINGE (ML) INJECTION EVERY 12 HOURS SCHEDULED
Status: DISCONTINUED | OUTPATIENT
Start: 2023-11-02 | End: 2023-11-02 | Stop reason: HOSPADM

## 2023-11-01 RX ORDER — CYCLOBENZAPRINE HCL 10 MG
10 TABLET ORAL 3 TIMES DAILY PRN
Status: DISCONTINUED | OUTPATIENT
Start: 2023-11-01 | End: 2023-11-02 | Stop reason: HOSPADM

## 2023-11-01 RX ORDER — LEVOTHYROXINE SODIUM 25 MCG
1 TABLET ORAL
Status: ON HOLD | COMMUNITY
Start: 2023-10-13

## 2023-11-01 RX ORDER — BUDESONIDE 0.5 MG/2ML
0.5 INHALANT ORAL
Status: DISCONTINUED | OUTPATIENT
Start: 2023-11-02 | End: 2023-11-02 | Stop reason: HOSPADM

## 2023-11-01 RX ORDER — ASPIRIN 81 MG/1
81 TABLET ORAL DAILY
Status: DISCONTINUED | OUTPATIENT
Start: 2023-11-02 | End: 2023-11-02 | Stop reason: HOSPADM

## 2023-11-01 RX ORDER — IPRATROPIUM BROMIDE AND ALBUTEROL SULFATE 2.5; .5 MG/3ML; MG/3ML
1 SOLUTION RESPIRATORY (INHALATION)
Status: COMPLETED | OUTPATIENT
Start: 2023-11-01 | End: 2023-11-01

## 2023-11-01 RX ORDER — POTASSIUM CHLORIDE 7.45 MG/ML
10 INJECTION INTRAVENOUS PRN
Status: DISCONTINUED | OUTPATIENT
Start: 2023-11-01 | End: 2023-11-02 | Stop reason: HOSPADM

## 2023-11-01 RX ORDER — NITROGLYCERIN 0.4 MG/1
0.4 TABLET SUBLINGUAL EVERY 5 MIN PRN
Status: DISCONTINUED | OUTPATIENT
Start: 2023-11-01 | End: 2023-11-02 | Stop reason: HOSPADM

## 2023-11-01 RX ORDER — ARFORMOTEROL TARTRATE 15 UG/2ML
15 SOLUTION RESPIRATORY (INHALATION)
Status: DISCONTINUED | OUTPATIENT
Start: 2023-11-02 | End: 2023-11-02 | Stop reason: HOSPADM

## 2023-11-01 RX ORDER — POTASSIUM CHLORIDE 750 MG/1
40 TABLET, FILM COATED, EXTENDED RELEASE ORAL PRN
Status: DISCONTINUED | OUTPATIENT
Start: 2023-11-01 | End: 2023-11-02 | Stop reason: HOSPADM

## 2023-11-01 RX ORDER — ZOLPIDEM TARTRATE 5 MG/1
5 TABLET ORAL NIGHTLY
Status: DISCONTINUED | OUTPATIENT
Start: 2023-11-02 | End: 2023-11-02

## 2023-11-01 RX ORDER — FUROSEMIDE 10 MG/ML
20 INJECTION INTRAMUSCULAR; INTRAVENOUS ONCE
Status: COMPLETED | OUTPATIENT
Start: 2023-11-02 | End: 2023-11-02

## 2023-11-01 RX ORDER — SODIUM CHLORIDE 9 MG/ML
INJECTION, SOLUTION INTRAVENOUS PRN
Status: DISCONTINUED | OUTPATIENT
Start: 2023-11-01 | End: 2023-11-02 | Stop reason: HOSPADM

## 2023-11-01 RX ORDER — GUAIFENESIN 600 MG/1
600 TABLET, EXTENDED RELEASE ORAL 2 TIMES DAILY
Status: DISCONTINUED | OUTPATIENT
Start: 2023-11-02 | End: 2023-11-02 | Stop reason: HOSPADM

## 2023-11-01 RX ORDER — NITROFURANTOIN 25; 75 MG/1; MG/1
100 CAPSULE ORAL 2 TIMES DAILY
Status: DISCONTINUED | OUTPATIENT
Start: 2023-10-27 | End: 2023-11-02

## 2023-11-01 RX ORDER — POLYETHYLENE GLYCOL 3350 17 G/17G
17 POWDER, FOR SOLUTION ORAL DAILY
Status: DISCONTINUED | OUTPATIENT
Start: 2023-11-02 | End: 2023-11-02 | Stop reason: HOSPADM

## 2023-11-01 RX ORDER — HYDROMORPHONE HYDROCHLORIDE 2 MG/1
2 TABLET ORAL EVERY 8 HOURS PRN
Status: DISCONTINUED | OUTPATIENT
Start: 2023-11-01 | End: 2023-11-02 | Stop reason: HOSPADM

## 2023-11-01 RX ORDER — ACETAMINOPHEN 500 MG
1000 TABLET ORAL EVERY 6 HOURS PRN
Status: DISCONTINUED | OUTPATIENT
Start: 2023-11-01 | End: 2023-11-02 | Stop reason: HOSPADM

## 2023-11-01 RX ADMIN — AZITHROMYCIN MONOHYDRATE 500 MG: 500 INJECTION, POWDER, LYOPHILIZED, FOR SOLUTION INTRAVENOUS at 19:07

## 2023-11-01 RX ADMIN — IOPAMIDOL 100 ML: 755 INJECTION, SOLUTION INTRAVENOUS at 17:55

## 2023-11-01 RX ADMIN — IPRATROPIUM BROMIDE AND ALBUTEROL SULFATE 1 DOSE: .5; 2.5 SOLUTION RESPIRATORY (INHALATION) at 18:48

## 2023-11-01 RX ADMIN — CEFTRIAXONE SODIUM 1000 MG: 1 INJECTION, POWDER, FOR SOLUTION INTRAMUSCULAR; INTRAVENOUS at 18:32

## 2023-11-01 ASSESSMENT — LIFESTYLE VARIABLES
HOW OFTEN DO YOU HAVE A DRINK CONTAINING ALCOHOL: NEVER
HOW MANY STANDARD DRINKS CONTAINING ALCOHOL DO YOU HAVE ON A TYPICAL DAY: PATIENT DOES NOT DRINK

## 2023-11-01 ASSESSMENT — PAIN SCALES - GENERAL: PAINLEVEL_OUTOF10: 0

## 2023-11-01 ASSESSMENT — PAIN - FUNCTIONAL ASSESSMENT: PAIN_FUNCTIONAL_ASSESSMENT: 0-10

## 2023-11-01 NOTE — ED PROVIDER NOTES
a chief complaint of shortness of breath and cough. Patient reports that she has been having shortness of breath for the last 3 to 4 weeks as well as cough and upper back pain for last 4 days. She went to her primary care doctor today where she was found to be in the 70s on room air. She does not chronically wear oxygen. Her primary care therefore sent her by private vehicle to the emergency department for evaluation. On presentation the emergency department she was quite dyspneic, hypoxic into the 80s but recovered quickly when started on 3 L nasal cannula. She does report some transient swelling in her lower extremities couple days ago that resolved with taking her home diuretic (though she denies any history of CHF). No abdominal pain, vomiting, diarrhea. No history of DVT or PE. On exam she is much more comfortable appearing on nasal cannula. Does have bibasilar Rales. Tachycardic to the 110s to 120s. EKG shows sinus tachycardia. Differential includes pneumonia, CHF, ACS, PE, covid    Will check basic lab work to rule out severe electrolyte derangements or anemia. Will check troponin and ekg to rule out ACS  Will check BNP to assess for fluid status/CHF  Will check CTA to r/o PE and focal airspace disease    Problems Addressed:  Acute respiratory failure with hypoxia ([address]): acute illness or injury  Multifocal pneumonia: acute illness or injury    Amount and/or Complexity of Data Reviewed  Labs: ordered. Decision-making details documented in ED Course. Radiology: ordered. Decision-making details documented in ED Course. ECG/medicine tests: ordered and independent interpretation performed. Decision-making details documented in ED Course. Risk  Prescription drug management. Decision regarding hospitalization. Lab work notable for mildly elevated creatinine, appears similar to prior from 1 year ago. BNP elevated at 9000 with a troponin of 176 that was flat on repeat.   CTA does not show

## 2023-11-01 NOTE — ED NOTES
Admission SBAR Note  Situation/Background:  Patient came into the ED via POV. Patient had an appointment today with PCP and was sent from there to ED. According to patient she has had increasing shortness of breath for 3-4 weeks, that got worse today. Initial R. A spo2- 82%, currently 92% on 4 lpm nasal canula. Patient is being transferred to [facility], Room# 120    Patient's Chief Complaint was shortness of breath and is admitted for pneumonia and hypoxia. CODE STATUS: Full  CSSRS: 0 - No Risk    ISOLATION/PRECAUTIONS: No  ISOLATION TYPE: n/a    Is this a behavioral health patient? No  Has wanding been completed n/a  Are belongings secure? N/a    Called outstanding consults: Yes    STAT labs collected: Yes    Repeat Lactic Acid DUE? No  TIME DUE: n/a    All STAT orders are complete: Yes    The following personal items will be sent with the patient during transfer to the floor: All valuables: listed in ER with patient, sent home with . ASSESSMENT:    NEURO:   NIH SCORE: 0,1-4,5-15,15-20,21-42: 0   LUNA SWALLOW SCREEN COMPLETE: Yes  ORIENTATION LEVEL: ORIENTATION LEVEL: Person, Place, Time, and Situation  Cognition:  appropriate decision making  Speech: shows no evidence of impairment    Is patient impulsive? No  Is patient oriented? Yes  Do they follow commands? Yes  Is the patient ambulatory? No    FALL RISK? Yes  Interventions: Implemented/recommended use of non-skid footwear    RESPIRATORY:   Is patient on oxygen? Yes  Oxygen therapy: Nasal cannula  O2 rate: 4    CARDIAC:   Is cardiac monitoring ordered? Yes    Last Rhythm: Rhythm including paccardio: Sinus Tachycardia   Patient to transfer with tele box on? Yes  Infusions: Meds; iv fluids: none  LINE ACCESS: 20G Peripheral IV , Antecubital , Iv rate: heplock       /GI:   Continent Bowel/Bladder?  Yes  Urinary Output: n/a  Chronic or Acute: n/a  If Chronic, is it 3 days

## 2023-11-01 NOTE — PLAN OF CARE
Med/surg nurse to page when patient has arrived to unit and ready for teledoc visit. Thank you.     Electronically signed by Kimi Anderson DO on 11/1/2023 at 7:30 PM

## 2023-11-01 NOTE — ED TRIAGE NOTES
Pt arrived by POV from PMD office for SOB. Pt went to see Dr. Samson Almodovar today for not feeling well and SOB for 3-4 weeks,  pt reports her SOB got worse today. Dr. Elvi Locke office called and reported pts oxygen was 79% on room and pt was in a-flutter with crackles, . Pt was placed on 2L with minimal improvement 80's so the oxygen was increased to 3L with SpO2 92%. Pts  drove pt to the ER without oxygen and pt noted to be 82% on arrival with RR of 30 and speaking in one word sentences, +JEFFERSON. Pt is awake alert and oriented X 4,, pt educated on ER flow. Pt placed on 3L NC with SpO2 94-96%.

## 2023-11-02 ENCOUNTER — APPOINTMENT (OUTPATIENT)
Facility: HOSPITAL | Age: 73
DRG: 871 | End: 2023-11-02
Payer: MEDICARE

## 2023-11-02 ENCOUNTER — HOSPITAL ENCOUNTER (INPATIENT)
Facility: HOSPITAL | Age: 73
LOS: 6 days | DRG: 853 | End: 2023-11-08
Attending: STUDENT IN AN ORGANIZED HEALTH CARE EDUCATION/TRAINING PROGRAM | Admitting: HOSPITALIST
Payer: MEDICARE

## 2023-11-02 VITALS
BODY MASS INDEX: 28.34 KG/M2 | WEIGHT: 166 LBS | SYSTOLIC BLOOD PRESSURE: 106 MMHG | DIASTOLIC BLOOD PRESSURE: 40 MMHG | OXYGEN SATURATION: 94 % | TEMPERATURE: 97.6 F | HEART RATE: 96 BPM | RESPIRATION RATE: 26 BRPM | HEIGHT: 64 IN

## 2023-11-02 DIAGNOSIS — J18.9 PNEUMONIA DUE TO INFECTIOUS ORGANISM, UNSPECIFIED LATERALITY, UNSPECIFIED PART OF LUNG: ICD-10-CM

## 2023-11-02 DIAGNOSIS — R78.81 BACTEREMIA DUE TO GRAM-POSITIVE BACTERIA: ICD-10-CM

## 2023-11-02 DIAGNOSIS — R78.81 BACTEREMIA: ICD-10-CM

## 2023-11-02 DIAGNOSIS — J96.01 ACUTE RESPIRATORY FAILURE WITH HYPOXIA (HCC): Primary | ICD-10-CM

## 2023-11-02 DIAGNOSIS — J81.0 ACUTE PULMONARY EDEMA (HCC): ICD-10-CM

## 2023-11-02 DIAGNOSIS — I35.1 SEVERE AORTIC REGURGITATION: ICD-10-CM

## 2023-11-02 PROBLEM — N18.9 ACUTE KIDNEY INJURY SUPERIMPOSED ON CHRONIC KIDNEY DISEASE (HCC): Status: ACTIVE | Noted: 2023-11-01

## 2023-11-02 PROBLEM — J98.4 DRUG-INDUCED PNEUMONITIS: Status: ACTIVE | Noted: 2023-11-02

## 2023-11-02 PROBLEM — T50.905A DRUG-INDUCED PNEUMONITIS: Status: ACTIVE | Noted: 2023-11-02

## 2023-11-02 PROBLEM — N18.32 STAGE 3B CHRONIC KIDNEY DISEASE (CKD) (HCC): Status: ACTIVE | Noted: 2023-11-01

## 2023-11-02 PROBLEM — J96.00 ACUTE RESPIRATORY FAILURE (HCC): Status: ACTIVE | Noted: 2023-11-02

## 2023-11-02 PROBLEM — D64.9 ANEMIA: Status: ACTIVE | Noted: 2023-11-02

## 2023-11-02 LAB
ACCESSION NUMBER, LLC1M: ABNORMAL
ACINETOBACTER CALCOAC BAUMANNII COMPLEX BY PCR: NOT DETECTED
ALBUMIN SERPL-MCNC: 2.1 G/DL (ref 3.5–5)
ALBUMIN SERPL-MCNC: 2.1 G/DL (ref 3.5–5)
ALBUMIN/GLOB SERPL: 0.5 (ref 1.1–2.2)
ALBUMIN/GLOB SERPL: 0.6 (ref 1.1–2.2)
ALP SERPL-CCNC: 82 U/L (ref 45–117)
ALP SERPL-CCNC: 82 U/L (ref 45–117)
ALT SERPL-CCNC: 20 U/L (ref 12–78)
ALT SERPL-CCNC: 20 U/L (ref 12–78)
ANION GAP BLD CALC-SCNC: 7 (ref 10–20)
ANION GAP SERPL CALC-SCNC: 7 MMOL/L (ref 5–15)
ANION GAP SERPL CALC-SCNC: 9 MMOL/L (ref 5–15)
APPEARANCE UR: ABNORMAL
ARTERIAL PATENCY WRIST A: NO
AST SERPL-CCNC: 23 U/L (ref 15–37)
AST SERPL-CCNC: 24 U/L (ref 15–37)
B FRAGILIS DNA BLD POS QL NAA+NON-PROBE: NOT DETECTED
BACTERIA URNS QL MICRO: NEGATIVE /HPF
BASE DEFICIT BLD-SCNC: 2.3 MMOL/L
BASE DEFICIT BLDA-SCNC: 1.1 MMOL/L
BASE DEFICIT BLDV-SCNC: 0.2 MMOL/L
BASOPHILS # BLD: 0 K/UL (ref 0–0.1)
BASOPHILS NFR BLD: 0 % (ref 0–1)
BDY SITE: ABNORMAL
BDY SITE: ABNORMAL
BILIRUB SERPL-MCNC: 0.7 MG/DL (ref 0.2–1)
BILIRUB SERPL-MCNC: 0.7 MG/DL (ref 0.2–1)
BILIRUB UR QL: NEGATIVE
BIOFIRE TEST COMMENT: ABNORMAL
BUN SERPL-MCNC: 22 MG/DL (ref 6–20)
BUN SERPL-MCNC: 25 MG/DL (ref 6–20)
BUN/CREAT SERPL: 13 (ref 12–20)
BUN/CREAT SERPL: 15 (ref 12–20)
C ALBICANS DNA BLD POS QL NAA+NON-PROBE: NOT DETECTED
C AURIS DNA BLD POS QL NAA+NON-PROBE: NOT DETECTED
C GATTII+NEOFOR DNA BLD POS QL NAA+N-PRB: NOT DETECTED
C GLABRATA DNA BLD POS QL NAA+NON-PROBE: NOT DETECTED
C KRUSEI DNA BLD POS QL NAA+NON-PROBE: NOT DETECTED
C PARAP DNA BLD POS QL NAA+NON-PROBE: NOT DETECTED
C TROPICLS DNA BLD POS QL NAA+NON-PROBE: NOT DETECTED
CA-I BLD-MCNC: 1.18 MMOL/L (ref 1.12–1.32)
CALCIUM SERPL-MCNC: 8.5 MG/DL (ref 8.5–10.1)
CALCIUM SERPL-MCNC: 8.5 MG/DL (ref 8.5–10.1)
CHLORIDE BLD-SCNC: 105 MMOL/L (ref 100–108)
CHLORIDE SERPL-SCNC: 100 MMOL/L (ref 97–108)
CHLORIDE SERPL-SCNC: 102 MMOL/L (ref 97–108)
CHOLEST SERPL-MCNC: 121 MG/DL
CO2 BLD-SCNC: 22 MMOL/L (ref 19–24)
CO2 SERPL-SCNC: 24 MMOL/L (ref 21–32)
CO2 SERPL-SCNC: 26 MMOL/L (ref 21–32)
COLOR UR: ABNORMAL
CREAT SERPL-MCNC: 1.66 MG/DL (ref 0.55–1.02)
CREAT SERPL-MCNC: 1.67 MG/DL (ref 0.55–1.02)
CREAT UR-MCNC: 1.3 MG/DL (ref 0.6–1.3)
DIFFERENTIAL METHOD BLD: ABNORMAL
E CLOAC COMP DNA BLD POS NAA+NON-PROBE: NOT DETECTED
E COLI DNA BLD POS QL NAA+NON-PROBE: NOT DETECTED
E FAECALIS DNA BLD POS QL NAA+NON-PROBE: DETECTED
E FAECIUM DNA BLD POS QL NAA+NON-PROBE: NOT DETECTED
ECHO AO ROOT DIAM: 3 CM
ECHO AO ROOT INDEX: 1.66 CM/M2
ECHO AV AREA PEAK VELOCITY: 1.3 CM2
ECHO AV AREA VTI: 1.3 CM2
ECHO AV AREA/BSA PEAK VELOCITY: 0.7 CM2/M2
ECHO AV AREA/BSA VTI: 0.7 CM2/M2
ECHO AV MEAN GRADIENT: 10 MMHG
ECHO AV MEAN VELOCITY: 1.4 M/S
ECHO AV PEAK GRADIENT: 18 MMHG
ECHO AV PEAK VELOCITY: 2.1 M/S
ECHO AV VELOCITY RATIO: 0.62
ECHO AV VTI: 43.2 CM
ECHO BSA: 1.84 M2
ECHO EST RA PRESSURE: 3 MMHG
ECHO LA DIAMETER INDEX: 2.27 CM/M2
ECHO LA DIAMETER: 4.1 CM
ECHO LA TO AORTIC ROOT RATIO: 1.37
ECHO LA VOL 2C: 76 ML (ref 22–52)
ECHO LA VOL 2C: 81 ML (ref 22–52)
ECHO LA VOL 4C: 81 ML (ref 22–52)
ECHO LA VOL 4C: 87 ML (ref 22–52)
ECHO LA VOLUME AREA LENGTH: 91 ML
ECHO LA VOLUME INDEX AREA LENGTH: 50 ML/M2 (ref 16–34)
ECHO LV E' LATERAL VELOCITY: 12 CM/S
ECHO LV E' SEPTAL VELOCITY: 12 CM/S
ECHO LV FRACTIONAL SHORTENING: 29 % (ref 28–44)
ECHO LV INTERNAL DIMENSION DIASTOLE INDEX: 2.1 CM/M2
ECHO LV INTERNAL DIMENSION DIASTOLIC: 3.8 CM (ref 3.9–5.3)
ECHO LV INTERNAL DIMENSION SYSTOLIC INDEX: 1.49 CM/M2
ECHO LV INTERNAL DIMENSION SYSTOLIC: 2.7 CM
ECHO LV IVSD: 1.1 CM (ref 0.6–0.9)
ECHO LV MASS 2D: 143.8 G (ref 67–162)
ECHO LV MASS INDEX 2D: 79.4 G/M2 (ref 43–95)
ECHO LV POSTERIOR WALL DIASTOLIC: 1.2 CM (ref 0.6–0.9)
ECHO LV RELATIVE WALL THICKNESS RATIO: 0.63
ECHO LVOT AREA: 2 CM2
ECHO LVOT AV VTI INDEX: 0.63
ECHO LVOT DIAM: 1.6 CM
ECHO LVOT MEAN GRADIENT: 4 MMHG
ECHO LVOT PEAK GRADIENT: 7 MMHG
ECHO LVOT PEAK VELOCITY: 1.3 M/S
ECHO LVOT STROKE VOLUME INDEX: 30.3 ML/M2
ECHO LVOT SV: 54.9 ML
ECHO LVOT VTI: 27.3 CM
ECHO MV A VELOCITY: 1.28 M/S
ECHO MV E DECELERATION TIME (DT): 166.1 MS
ECHO MV E VELOCITY: 1.19 M/S
ECHO MV E/A RATIO: 0.93
ECHO MV E/E' LATERAL: 9.92
ECHO MV E/E' RATIO (AVERAGED): 9.92
ECHO MV E/E' SEPTAL: 9.92
ECHO PULMONARY ARTERY SYSTOLIC PRESSURE (PASP): 14 MMHG
ECHO RA AREA 4C: 8.8 CM2
ECHO RIGHT VENTRICULAR SYSTOLIC PRESSURE (RVSP): 14 MMHG
ECHO RV TAPSE: 2.5 CM (ref 1.7–?)
ECHO TV REGURGITANT MAX VELOCITY: 1.69 M/S
ECHO TV REGURGITANT PEAK GRADIENT: 12 MMHG
EKG ATRIAL RATE: 119 BPM
EKG DIAGNOSIS: NORMAL
EKG P AXIS: 50 DEGREES
EKG P-R INTERVAL: 186 MS
EKG Q-T INTERVAL: 310 MS
EKG QRS DURATION: 84 MS
EKG QTC CALCULATION (BAZETT): 436 MS
EKG R AXIS: 10 DEGREES
EKG T AXIS: 30 DEGREES
EKG VENTRICULAR RATE: 119 BPM
ENTEROBACTERALES DNA BLD POS NAA+N-PRB: NOT DETECTED
EOSINOPHIL # BLD: 0 K/UL (ref 0–0.4)
EOSINOPHIL NFR BLD: 0 % (ref 0–7)
EPITH CASTS URNS QL MICRO: ABNORMAL /LPF
ERYTHROCYTE [DISTWIDTH] IN BLOOD BY AUTOMATED COUNT: 15.1 % (ref 11.5–14.5)
ERYTHROCYTE [DISTWIDTH] IN BLOOD BY AUTOMATED COUNT: 15.4 % (ref 11.5–14.5)
EST. AVERAGE GLUCOSE BLD GHB EST-MCNC: 108 MG/DL
FERRITIN SERPL-MCNC: 646 NG/ML (ref 8–252)
FIO2 ON VENT: 75 %
FOLATE SERPL-MCNC: 15.9 NG/ML (ref 5–21)
GAS FLOW.O2 O2 DELIVERY SYS: 4 L/MIN
GAS FLOW.O2 SETTING OXYMISER: 8
GLOBULIN SER CALC-MCNC: 3.5 G/DL (ref 2–4)
GLOBULIN SER CALC-MCNC: 4.5 G/DL (ref 2–4)
GLUCOSE BLD STRIP.AUTO-MCNC: 139 MG/DL (ref 74–106)
GLUCOSE BLD STRIP.AUTO-MCNC: 141 MG/DL (ref 65–117)
GLUCOSE SERPL-MCNC: 128 MG/DL (ref 65–100)
GLUCOSE SERPL-MCNC: 163 MG/DL (ref 65–100)
GLUCOSE UR STRIP.AUTO-MCNC: NEGATIVE MG/DL
GP B STREP DNA BLD POS QL NAA+NON-PROBE: NOT DETECTED
HAEM INFLU DNA BLD POS QL NAA+NON-PROBE: NOT DETECTED
HBA1C MFR BLD: 5.4 % (ref 4–5.6)
HCO3 BLDA-SCNC: 22 MMOL/L
HCO3 BLDA-SCNC: 22 MMOL/L (ref 22–26)
HCO3 BLDV-SCNC: 22 MMOL/L (ref 23–28)
HCT VFR BLD AUTO: 24.4 % (ref 35–47)
HCT VFR BLD AUTO: 25 % (ref 35–47)
HDLC SERPL-MCNC: 12 MG/DL
HDLC SERPL: 10.1 (ref 0–5)
HGB BLD-MCNC: 8 G/DL (ref 11.5–16)
HGB BLD-MCNC: 8 G/DL (ref 11.5–16)
HGB UR QL STRIP: ABNORMAL
IMM GRANULOCYTES # BLD AUTO: 0 K/UL (ref 0–0.04)
IMM GRANULOCYTES NFR BLD AUTO: 0 % (ref 0–0.5)
IPAP/PIP: 16
IRON SATN MFR SERPL: 11 % (ref 20–50)
IRON SERPL-MCNC: 17 UG/DL (ref 50–170)
K OXYTOCA DNA BLD POS QL NAA+NON-PROBE: NOT DETECTED
KETONES UR QL STRIP.AUTO: NEGATIVE MG/DL
KLEBSIELLA SP DNA BLD POS QL NAA+NON-PRB: NOT DETECTED
KLEBSIELLA SP DNA BLD POS QL NAA+NON-PRB: NOT DETECTED
L MONOCYTOG DNA BLD POS QL NAA+NON-PROBE: NOT DETECTED
LACTATE BLD-SCNC: 0.85 MMOL/L (ref 0.4–2)
LDLC SERPL CALC-MCNC: 72.2 MG/DL (ref 0–100)
LEUKOCYTE ESTERASE UR QL STRIP.AUTO: ABNORMAL
LYMPHOCYTES # BLD: 0.5 K/UL (ref 0.8–3.5)
LYMPHOCYTES NFR BLD: 7 % (ref 12–49)
MAGNESIUM SERPL-MCNC: 1.7 MG/DL (ref 1.6–2.4)
MCH RBC QN AUTO: 28 PG (ref 26–34)
MCH RBC QN AUTO: 29.3 PG (ref 26–34)
MCHC RBC AUTO-ENTMCNC: 32 G/DL (ref 30–36.5)
MCHC RBC AUTO-ENTMCNC: 32.8 G/DL (ref 30–36.5)
MCV RBC AUTO: 87.4 FL (ref 80–99)
MCV RBC AUTO: 89.4 FL (ref 80–99)
MONOCYTES # BLD: 0.2 K/UL (ref 0–1)
MONOCYTES NFR BLD: 3 % (ref 5–13)
N MEN DNA BLD POS QL NAA+NON-PROBE: NOT DETECTED
NEUTS SEG # BLD: 6.9 K/UL (ref 1.8–8)
NEUTS SEG NFR BLD: 90 % (ref 32–75)
NITRITE UR QL STRIP.AUTO: NEGATIVE
NRBC # BLD: 0 K/UL (ref 0–0.01)
NRBC # BLD: 0 K/UL (ref 0–0.01)
NRBC BLD-RTO: 0 PER 100 WBC
NRBC BLD-RTO: 0 PER 100 WBC
NT PRO BNP: ABNORMAL PG/ML
P AERUGINOSA DNA BLD POS NAA+NON-PROBE: NOT DETECTED
PCO2 BLDA: 31 MMHG (ref 35–45)
PCO2 BLDV: 30.8 MMHG (ref 41–51)
PCO2 BLDV: 34.2 MMHG (ref 41–51)
PEEP RESPIRATORY: 10
PH BLDA: 7.46 (ref 7.35–7.45)
PH BLDV: 7.41 (ref 7.32–7.42)
PH BLDV: 7.48 (ref 7.32–7.42)
PH UR STRIP: 5.5 (ref 5–8)
PLATELET # BLD AUTO: 141 K/UL (ref 150–400)
PLATELET # BLD AUTO: 145 K/UL (ref 150–400)
PMV BLD AUTO: 10.5 FL (ref 8.9–12.9)
PMV BLD AUTO: 10.6 FL (ref 8.9–12.9)
PO2 BLDA: 124 MMHG (ref 80–100)
PO2 BLDV: 38 MMHG (ref 25–40)
PO2 BLDV: 41 MMHG (ref 25–40)
POTASSIUM BLD-SCNC: 4.3 MMOL/L (ref 3.5–5.5)
POTASSIUM SERPL-SCNC: 4.1 MMOL/L (ref 3.5–5.1)
POTASSIUM SERPL-SCNC: 4.1 MMOL/L (ref 3.5–5.1)
PROCALCITONIN SERPL-MCNC: 0.55 NG/ML
PROT SERPL-MCNC: 5.6 G/DL (ref 6.4–8.2)
PROT SERPL-MCNC: 6.6 G/DL (ref 6.4–8.2)
PROT UR STRIP-MCNC: 30 MG/DL
PROTEUS SP DNA BLD POS QL NAA+NON-PROBE: NOT DETECTED
RBC # BLD AUTO: 2.73 M/UL (ref 3.8–5.2)
RBC # BLD AUTO: 2.86 M/UL (ref 3.8–5.2)
RBC #/AREA URNS HPF: ABNORMAL /HPF (ref 0–5)
RBC MORPH BLD: ABNORMAL
RESISTANT GENE TARGETS: ABNORMAL
RETICS # AUTO: 0.06 M/UL (ref 0.02–0.08)
RETICS/RBC NFR AUTO: 2.2 % (ref 0.7–2.1)
S AUREUS DNA BLD POS QL NAA+NON-PROBE: NOT DETECTED
S AUREUS+CONS DNA BLD POS NAA+NON-PROBE: NOT DETECTED
S EPIDERMIDIS DNA BLD POS QL NAA+NON-PRB: NOT DETECTED
S LUGDUNENSIS DNA BLD POS QL NAA+NON-PRB: NOT DETECTED
S MALTOPHILIA DNA BLD POS QL NAA+NON-PRB: NOT DETECTED
S MARCESCENS DNA BLD POS NAA+NON-PROBE: NOT DETECTED
S PNEUM DNA BLD POS QL NAA+NON-PROBE: NOT DETECTED
S PYO DNA BLD POS QL NAA+NON-PROBE: NOT DETECTED
SALMONELLA DNA BLD POS QL NAA+NON-PROBE: NOT DETECTED
SAO2 % BLD: 73 %
SAO2 % BLD: 99 % (ref 92–97)
SAO2 % BLDV: 81 % (ref 65–88)
SAO2% DEVICE SAO2% SENSOR NAME: ABNORMAL
SAO2% DEVICE SAO2% SENSOR NAME: ABNORMAL
SERVICE CMNT-IMP: ABNORMAL
SERVICE CMNT-IMP: ABNORMAL
SODIUM BLD-SCNC: 134 MMOL/L (ref 136–145)
SODIUM SERPL-SCNC: 133 MMOL/L (ref 136–145)
SODIUM SERPL-SCNC: 135 MMOL/L (ref 136–145)
SP GR UR REFRACTOMETRY: 1.02
SPECIMEN SITE: ABNORMAL
STREPTOCOCCUS DNA BLD POS NAA+NON-PROBE: NOT DETECTED
T4 FREE SERPL-MCNC: 1.6 NG/DL (ref 0.8–1.5)
T4 FREE SERPL-MCNC: 1.8 NG/DL (ref 0.8–1.5)
TIBC SERPL-MCNC: 150 UG/DL (ref 250–450)
TRIGL SERPL-MCNC: 184 MG/DL
TROPONIN I SERPL HS-MCNC: 97 NG/L (ref 0–51)
TSH SERPL DL<=0.05 MIU/L-ACNC: 1.03 UIU/ML (ref 0.36–3.74)
TSH SERPL DL<=0.05 MIU/L-ACNC: 1.7 UIU/ML (ref 0.36–3.74)
URINE CULTURE IF INDICATED: ABNORMAL
UROBILINOGEN UR QL STRIP.AUTO: 0.2 EU/DL (ref 0.2–1)
VANA+VANB BLD POS QL NAA+NON-PROBE: NOT DETECTED
VENTILATION MODE VENT: ABNORMAL
VIT B12 SERPL-MCNC: 965 PG/ML (ref 193–986)
VLDLC SERPL CALC-MCNC: 36.8 MG/DL
WBC # BLD AUTO: 7.1 K/UL (ref 3.6–11)
WBC # BLD AUTO: 7.6 K/UL (ref 3.6–11)
WBC URNS QL MICRO: ABNORMAL /HPF (ref 0–4)
YEAST URNS QL MICRO: PRESENT

## 2023-11-02 PROCEDURE — 36415 COLL VENOUS BLD VENIPUNCTURE: CPT

## 2023-11-02 PROCEDURE — 71045 X-RAY EXAM CHEST 1 VIEW: CPT

## 2023-11-02 PROCEDURE — 82962 GLUCOSE BLOOD TEST: CPT

## 2023-11-02 PROCEDURE — 6370000000 HC RX 637 (ALT 250 FOR IP): Performed by: STUDENT IN AN ORGANIZED HEALTH CARE EDUCATION/TRAINING PROGRAM

## 2023-11-02 PROCEDURE — P9045 ALBUMIN (HUMAN), 5%, 250 ML: HCPCS | Performed by: NURSE PRACTITIONER

## 2023-11-02 PROCEDURE — 85027 COMPLETE CBC AUTOMATED: CPT

## 2023-11-02 PROCEDURE — 6360000002 HC RX W HCPCS: Performed by: STUDENT IN AN ORGANIZED HEALTH CARE EDUCATION/TRAINING PROGRAM

## 2023-11-02 PROCEDURE — 0BH17EZ INSERTION OF ENDOTRACHEAL AIRWAY INTO TRACHEA, VIA NATURAL OR ARTIFICIAL OPENING: ICD-10-PCS | Performed by: HOSPITALIST

## 2023-11-02 PROCEDURE — 94761 N-INVAS EAR/PLS OXIMETRY MLT: CPT

## 2023-11-02 PROCEDURE — 6360000002 HC RX W HCPCS: Performed by: HOSPITALIST

## 2023-11-02 PROCEDURE — 84439 ASSAY OF FREE THYROXINE: CPT

## 2023-11-02 PROCEDURE — 94660 CPAP INITIATION&MGMT: CPT

## 2023-11-02 PROCEDURE — 87449 NOS EACH ORGANISM AG IA: CPT

## 2023-11-02 PROCEDURE — 2700000000 HC OXYGEN THERAPY PER DAY

## 2023-11-02 PROCEDURE — 5A09357 ASSISTANCE WITH RESPIRATORY VENTILATION, LESS THAN 24 CONSECUTIVE HOURS, CONTINUOUS POSITIVE AIRWAY PRESSURE: ICD-10-PCS | Performed by: INTERNAL MEDICINE

## 2023-11-02 PROCEDURE — 83036 HEMOGLOBIN GLYCOSYLATED A1C: CPT

## 2023-11-02 PROCEDURE — 84295 ASSAY OF SERUM SODIUM: CPT

## 2023-11-02 PROCEDURE — 82330 ASSAY OF CALCIUM: CPT

## 2023-11-02 PROCEDURE — 2580000003 HC RX 258: Performed by: STUDENT IN AN ORGANIZED HEALTH CARE EDUCATION/TRAINING PROGRAM

## 2023-11-02 PROCEDURE — 5A09357 ASSISTANCE WITH RESPIRATORY VENTILATION, LESS THAN 24 CONSECUTIVE HOURS, CONTINUOUS POSITIVE AIRWAY PRESSURE: ICD-10-PCS | Performed by: HOSPITALIST

## 2023-11-02 PROCEDURE — 85045 AUTOMATED RETICULOCYTE COUNT: CPT

## 2023-11-02 PROCEDURE — 80053 COMPREHEN METABOLIC PANEL: CPT

## 2023-11-02 PROCEDURE — 83540 ASSAY OF IRON: CPT

## 2023-11-02 PROCEDURE — 85025 COMPLETE CBC W/AUTO DIFF WBC: CPT

## 2023-11-02 PROCEDURE — 82607 VITAMIN B-12: CPT

## 2023-11-02 PROCEDURE — 82803 BLOOD GASES ANY COMBINATION: CPT

## 2023-11-02 PROCEDURE — 81001 URINALYSIS AUTO W/SCOPE: CPT

## 2023-11-02 PROCEDURE — 82728 ASSAY OF FERRITIN: CPT

## 2023-11-02 PROCEDURE — 6360000002 HC RX W HCPCS

## 2023-11-02 PROCEDURE — 2580000003 HC RX 258: Performed by: INTERNAL MEDICINE

## 2023-11-02 PROCEDURE — 6360000002 HC RX W HCPCS: Performed by: NURSE PRACTITIONER

## 2023-11-02 PROCEDURE — 94640 AIRWAY INHALATION TREATMENT: CPT

## 2023-11-02 PROCEDURE — 84443 ASSAY THYROID STIM HORMONE: CPT

## 2023-11-02 PROCEDURE — 83735 ASSAY OF MAGNESIUM: CPT

## 2023-11-02 PROCEDURE — 83550 IRON BINDING TEST: CPT

## 2023-11-02 PROCEDURE — 5A1955Z RESPIRATORY VENTILATION, GREATER THAN 96 CONSECUTIVE HOURS: ICD-10-PCS | Performed by: HOSPITALIST

## 2023-11-02 PROCEDURE — 2000000000 HC ICU R&B

## 2023-11-02 PROCEDURE — 82746 ASSAY OF FOLIC ACID SERUM: CPT

## 2023-11-02 PROCEDURE — 99285 EMERGENCY DEPT VISIT HI MDM: CPT

## 2023-11-02 PROCEDURE — 82947 ASSAY GLUCOSE BLOOD QUANT: CPT

## 2023-11-02 PROCEDURE — 83880 ASSAY OF NATRIURETIC PEPTIDE: CPT

## 2023-11-02 PROCEDURE — 2580000003 HC RX 258: Performed by: HOSPITALIST

## 2023-11-02 PROCEDURE — 36600 WITHDRAWAL OF ARTERIAL BLOOD: CPT

## 2023-11-02 PROCEDURE — 84484 ASSAY OF TROPONIN QUANT: CPT

## 2023-11-02 PROCEDURE — 93306 TTE W/DOPPLER COMPLETE: CPT

## 2023-11-02 PROCEDURE — 84132 ASSAY OF SERUM POTASSIUM: CPT

## 2023-11-02 PROCEDURE — 6360000002 HC RX W HCPCS: Performed by: INTERNAL MEDICINE

## 2023-11-02 PROCEDURE — 80061 LIPID PANEL: CPT

## 2023-11-02 PROCEDURE — 87899 AGENT NOS ASSAY W/OPTIC: CPT

## 2023-11-02 RX ORDER — POTASSIUM CHLORIDE 7.45 MG/ML
10 INJECTION INTRAVENOUS PRN
Status: DISCONTINUED | OUTPATIENT
Start: 2023-11-02 | End: 2023-11-02

## 2023-11-02 RX ORDER — IPRATROPIUM BROMIDE AND ALBUTEROL SULFATE 2.5; .5 MG/3ML; MG/3ML
1 SOLUTION RESPIRATORY (INHALATION)
Status: DISCONTINUED | OUTPATIENT
Start: 2023-11-02 | End: 2023-11-02

## 2023-11-02 RX ORDER — SODIUM CHLORIDE 0.9 % (FLUSH) 0.9 %
5-40 SYRINGE (ML) INJECTION PRN
Status: DISCONTINUED | OUTPATIENT
Start: 2023-11-02 | End: 2023-11-09 | Stop reason: HOSPADM

## 2023-11-02 RX ORDER — 0.9 % SODIUM CHLORIDE 0.9 %
250 INTRAVENOUS SOLUTION INTRAVENOUS ONCE
Status: COMPLETED | OUTPATIENT
Start: 2023-11-02 | End: 2023-11-02

## 2023-11-02 RX ORDER — MORPHINE SULFATE 2 MG/ML
2 INJECTION, SOLUTION INTRAMUSCULAR; INTRAVENOUS ONCE
Status: COMPLETED | OUTPATIENT
Start: 2023-11-02 | End: 2023-11-02

## 2023-11-02 RX ORDER — FUROSEMIDE 10 MG/ML
INJECTION INTRAMUSCULAR; INTRAVENOUS
Status: COMPLETED
Start: 2023-11-02 | End: 2023-11-02

## 2023-11-02 RX ORDER — MAGNESIUM SULFATE IN WATER 40 MG/ML
2000 INJECTION, SOLUTION INTRAVENOUS PRN
OUTPATIENT
Start: 2023-11-02

## 2023-11-02 RX ORDER — FUROSEMIDE 10 MG/ML
INJECTION INTRAMUSCULAR; INTRAVENOUS
Status: DISCONTINUED
Start: 2023-11-02 | End: 2023-11-02 | Stop reason: HOSPADM

## 2023-11-02 RX ORDER — HYDROMORPHONE HYDROCHLORIDE 2 MG/1
2 TABLET ORAL EVERY 8 HOURS PRN
OUTPATIENT
Start: 2023-11-02

## 2023-11-02 RX ORDER — IPRATROPIUM BROMIDE AND ALBUTEROL SULFATE 2.5; .5 MG/3ML; MG/3ML
1 SOLUTION RESPIRATORY (INHALATION) EVERY 4 HOURS PRN
OUTPATIENT
Start: 2023-11-02

## 2023-11-02 RX ORDER — PANTOPRAZOLE SODIUM 40 MG/1
40 TABLET, DELAYED RELEASE ORAL
OUTPATIENT
Start: 2023-11-03

## 2023-11-02 RX ORDER — PANTOPRAZOLE SODIUM 40 MG/1
40 TABLET, DELAYED RELEASE ORAL
Status: DISCONTINUED | OUTPATIENT
Start: 2023-11-03 | End: 2023-11-02 | Stop reason: HOSPADM

## 2023-11-02 RX ORDER — SODIUM CHLORIDE 9 MG/ML
INJECTION, SOLUTION INTRAVENOUS PRN
Status: DISCONTINUED | OUTPATIENT
Start: 2023-11-02 | End: 2023-11-09 | Stop reason: HOSPADM

## 2023-11-02 RX ORDER — ACETAMINOPHEN 500 MG
1000 TABLET ORAL EVERY 6 HOURS PRN
OUTPATIENT
Start: 2023-11-02

## 2023-11-02 RX ORDER — POTASSIUM CHLORIDE 750 MG/1
40 TABLET, FILM COATED, EXTENDED RELEASE ORAL PRN
OUTPATIENT
Start: 2023-11-02

## 2023-11-02 RX ORDER — 0.9 % SODIUM CHLORIDE 0.9 %
1000 INTRAVENOUS SOLUTION INTRAVENOUS ONCE
Status: DISCONTINUED | OUTPATIENT
Start: 2023-11-02 | End: 2023-11-02

## 2023-11-02 RX ORDER — BENZONATATE 100 MG/1
100 CAPSULE ORAL 3 TIMES DAILY PRN
OUTPATIENT
Start: 2023-11-02

## 2023-11-02 RX ORDER — ACETAMINOPHEN 650 MG/1
650 SUPPOSITORY RECTAL EVERY 6 HOURS PRN
OUTPATIENT
Start: 2023-11-02

## 2023-11-02 RX ORDER — IPRATROPIUM BROMIDE AND ALBUTEROL SULFATE 2.5; .5 MG/3ML; MG/3ML
1 SOLUTION RESPIRATORY (INHALATION)
Status: DISCONTINUED | OUTPATIENT
Start: 2023-11-02 | End: 2023-11-06

## 2023-11-02 RX ORDER — ALBUMIN, HUMAN INJ 5% 5 %
25 SOLUTION INTRAVENOUS ONCE
Status: COMPLETED | OUTPATIENT
Start: 2023-11-02 | End: 2023-11-02

## 2023-11-02 RX ORDER — HEPARIN SODIUM 5000 [USP'U]/ML
5000 INJECTION, SOLUTION INTRAVENOUS; SUBCUTANEOUS EVERY 8 HOURS SCHEDULED
OUTPATIENT
Start: 2023-11-02

## 2023-11-02 RX ORDER — MORPHINE SULFATE 2 MG/ML
INJECTION, SOLUTION INTRAMUSCULAR; INTRAVENOUS
Status: COMPLETED
Start: 2023-11-02 | End: 2023-11-02

## 2023-11-02 RX ORDER — POTASSIUM CHLORIDE 29.8 MG/ML
20 INJECTION INTRAVENOUS PRN
Status: DISCONTINUED | OUTPATIENT
Start: 2023-11-02 | End: 2023-11-02

## 2023-11-02 RX ORDER — SODIUM CHLORIDE 0.9 % (FLUSH) 0.9 %
5-40 SYRINGE (ML) INJECTION EVERY 12 HOURS SCHEDULED
OUTPATIENT
Start: 2023-11-02

## 2023-11-02 RX ORDER — LEVOTHYROXINE SODIUM 0.03 MG/1
25 TABLET ORAL
OUTPATIENT
Start: 2023-11-03

## 2023-11-02 RX ORDER — NITROGLYCERIN 0.4 MG/1
0.4 TABLET SUBLINGUAL EVERY 5 MIN PRN
OUTPATIENT
Start: 2023-11-02

## 2023-11-02 RX ORDER — FUROSEMIDE 10 MG/ML
80 INJECTION INTRAMUSCULAR; INTRAVENOUS ONCE
Status: COMPLETED | OUTPATIENT
Start: 2023-11-02 | End: 2023-11-02

## 2023-11-02 RX ORDER — ZOLPIDEM TARTRATE 5 MG/1
10 TABLET ORAL NIGHTLY
OUTPATIENT
Start: 2023-11-02

## 2023-11-02 RX ORDER — ZOLPIDEM TARTRATE 5 MG/1
10 TABLET ORAL NIGHTLY
Status: DISCONTINUED | OUTPATIENT
Start: 2023-11-02 | End: 2023-11-02 | Stop reason: HOSPADM

## 2023-11-02 RX ORDER — POLYETHYLENE GLYCOL 3350 17 G/17G
17 POWDER, FOR SOLUTION ORAL DAILY PRN
Status: DISCONTINUED | OUTPATIENT
Start: 2023-11-02 | End: 2023-11-09 | Stop reason: HOSPADM

## 2023-11-02 RX ORDER — ENOXAPARIN SODIUM 100 MG/ML
40 INJECTION SUBCUTANEOUS DAILY
Status: DISCONTINUED | OUTPATIENT
Start: 2023-11-02 | End: 2023-11-03

## 2023-11-02 RX ORDER — PROCHLORPERAZINE EDISYLATE 5 MG/ML
10 INJECTION INTRAMUSCULAR; INTRAVENOUS EVERY 6 HOURS PRN
OUTPATIENT
Start: 2023-11-02

## 2023-11-02 RX ORDER — POTASSIUM CHLORIDE 7.45 MG/ML
10 INJECTION INTRAVENOUS PRN
OUTPATIENT
Start: 2023-11-02

## 2023-11-02 RX ORDER — ONDANSETRON 2 MG/ML
4 INJECTION INTRAMUSCULAR; INTRAVENOUS EVERY 6 HOURS PRN
Status: DISCONTINUED | OUTPATIENT
Start: 2023-11-02 | End: 2023-11-09 | Stop reason: HOSPADM

## 2023-11-02 RX ORDER — NOREPINEPHRINE BITARTRATE 0.06 MG/ML
.5-2 INJECTION, SOLUTION INTRAVENOUS CONTINUOUS
Status: DISCONTINUED | OUTPATIENT
Start: 2023-11-02 | End: 2023-11-03

## 2023-11-02 RX ORDER — ARFORMOTEROL TARTRATE 15 UG/2ML
15 SOLUTION RESPIRATORY (INHALATION)
OUTPATIENT
Start: 2023-11-02

## 2023-11-02 RX ORDER — SODIUM CHLORIDE 0.9 % (FLUSH) 0.9 %
5-40 SYRINGE (ML) INJECTION EVERY 12 HOURS SCHEDULED
Status: DISCONTINUED | OUTPATIENT
Start: 2023-11-02 | End: 2023-11-09 | Stop reason: HOSPADM

## 2023-11-02 RX ORDER — FUROSEMIDE 20 MG/1
20 TABLET ORAL DAILY
OUTPATIENT
Start: 2023-11-03

## 2023-11-02 RX ORDER — BUDESONIDE 0.5 MG/2ML
0.5 INHALANT ORAL
OUTPATIENT
Start: 2023-11-02

## 2023-11-02 RX ORDER — GUAIFENESIN 600 MG/1
600 TABLET, EXTENDED RELEASE ORAL 2 TIMES DAILY
OUTPATIENT
Start: 2023-11-02

## 2023-11-02 RX ORDER — SODIUM CHLORIDE 9 MG/ML
INJECTION, SOLUTION INTRAVENOUS PRN
OUTPATIENT
Start: 2023-11-02

## 2023-11-02 RX ORDER — INSULIN LISPRO 100 [IU]/ML
0-8 INJECTION, SOLUTION INTRAVENOUS; SUBCUTANEOUS EVERY 6 HOURS
Status: DISCONTINUED | OUTPATIENT
Start: 2023-11-02 | End: 2023-11-03

## 2023-11-02 RX ORDER — ACETAMINOPHEN 650 MG/1
650 SUPPOSITORY RECTAL EVERY 6 HOURS PRN
Status: DISCONTINUED | OUTPATIENT
Start: 2023-11-02 | End: 2023-11-09 | Stop reason: HOSPADM

## 2023-11-02 RX ORDER — ONDANSETRON 4 MG/1
4 TABLET, ORALLY DISINTEGRATING ORAL EVERY 8 HOURS PRN
Status: DISCONTINUED | OUTPATIENT
Start: 2023-11-02 | End: 2023-11-09 | Stop reason: HOSPADM

## 2023-11-02 RX ORDER — MAGNESIUM SULFATE IN WATER 40 MG/ML
2000 INJECTION, SOLUTION INTRAVENOUS PRN
Status: DISCONTINUED | OUTPATIENT
Start: 2023-11-02 | End: 2023-11-02

## 2023-11-02 RX ORDER — SODIUM CHLORIDE 0.9 % (FLUSH) 0.9 %
5-40 SYRINGE (ML) INJECTION PRN
OUTPATIENT
Start: 2023-11-02

## 2023-11-02 RX ORDER — ACETAMINOPHEN 325 MG/1
650 TABLET ORAL EVERY 6 HOURS PRN
Status: DISCONTINUED | OUTPATIENT
Start: 2023-11-02 | End: 2023-11-09 | Stop reason: HOSPADM

## 2023-11-02 RX ORDER — ASPIRIN 81 MG/1
81 TABLET ORAL DAILY
OUTPATIENT
Start: 2023-11-03

## 2023-11-02 RX ORDER — POLYETHYLENE GLYCOL 3350 17 G/17G
17 POWDER, FOR SOLUTION ORAL DAILY
OUTPATIENT
Start: 2023-11-03

## 2023-11-02 RX ORDER — CYCLOBENZAPRINE HCL 10 MG
10 TABLET ORAL 3 TIMES DAILY PRN
OUTPATIENT
Start: 2023-11-02

## 2023-11-02 RX ADMIN — CYCLOBENZAPRINE 10 MG: 10 TABLET, FILM COATED ORAL at 00:38

## 2023-11-02 RX ADMIN — METHYLPREDNISOLONE SODIUM SUCCINATE 40 MG: 40 INJECTION, POWDER, FOR SOLUTION INTRAMUSCULAR; INTRAVENOUS at 18:46

## 2023-11-02 RX ADMIN — ENOXAPARIN SODIUM 40 MG: 100 INJECTION SUBCUTANEOUS at 20:47

## 2023-11-02 RX ADMIN — FUROSEMIDE 20 MG: 20 TABLET ORAL at 08:43

## 2023-11-02 RX ADMIN — NITROFURANTOIN MONOHYDRATE/MACROCRYSTALLINE 100 MG: 25; 75 CAPSULE ORAL at 00:38

## 2023-11-02 RX ADMIN — FUROSEMIDE 80 MG: 10 INJECTION, SOLUTION INTRAMUSCULAR; INTRAVENOUS at 12:18

## 2023-11-02 RX ADMIN — AMLODIPINE BESYLATE 2.5 MG: 2.5 TABLET ORAL at 00:39

## 2023-11-02 RX ADMIN — LEVOTHYROXINE SODIUM 25 MCG: 0.03 TABLET ORAL at 06:16

## 2023-11-02 RX ADMIN — GUAIFENESIN 600 MG: 600 TABLET, EXTENDED RELEASE ORAL at 08:43

## 2023-11-02 RX ADMIN — VANCOMYCIN HYDROCHLORIDE 2000 MG: 1 INJECTION, POWDER, LYOPHILIZED, FOR SOLUTION INTRAVENOUS at 08:33

## 2023-11-02 RX ADMIN — GUAIFENESIN 600 MG: 600 TABLET, EXTENDED RELEASE ORAL at 00:34

## 2023-11-02 RX ADMIN — FUROSEMIDE 80 MG: 10 INJECTION INTRAMUSCULAR; INTRAVENOUS at 12:18

## 2023-11-02 RX ADMIN — SODIUM CHLORIDE, PRESERVATIVE FREE 10 ML: 5 INJECTION INTRAVENOUS at 19:46

## 2023-11-02 RX ADMIN — AZITHROMYCIN MONOHYDRATE 500 MG: 500 INJECTION, POWDER, LYOPHILIZED, FOR SOLUTION INTRAVENOUS at 19:50

## 2023-11-02 RX ADMIN — BUDESONIDE 500 MCG: 0.5 INHALANT RESPIRATORY (INHALATION) at 00:37

## 2023-11-02 RX ADMIN — IPRATROPIUM BROMIDE 0.5 MG: 0.5 SOLUTION RESPIRATORY (INHALATION) at 08:03

## 2023-11-02 RX ADMIN — BENZONATATE 100 MG: 100 CAPSULE ORAL at 00:38

## 2023-11-02 RX ADMIN — ARFORMOTEROL TARTRATE 15 MCG: 15 SOLUTION RESPIRATORY (INHALATION) at 08:02

## 2023-11-02 RX ADMIN — ZOLPIDEM TARTRATE 10 MG: 5 TABLET ORAL at 00:38

## 2023-11-02 RX ADMIN — METOPROLOL TARTRATE 12.5 MG: 25 TABLET, FILM COATED ORAL at 00:38

## 2023-11-02 RX ADMIN — METOPROLOL TARTRATE 12.5 MG: 25 TABLET, FILM COATED ORAL at 08:43

## 2023-11-02 RX ADMIN — IPRATROPIUM BROMIDE AND ALBUTEROL SULFATE 1 DOSE: 2.5; .5 SOLUTION RESPIRATORY (INHALATION) at 19:40

## 2023-11-02 RX ADMIN — WATER 40 MG: 1 INJECTION INTRAMUSCULAR; INTRAVENOUS; SUBCUTANEOUS at 10:00

## 2023-11-02 RX ADMIN — FUROSEMIDE 20 MG: 10 INJECTION, SOLUTION INTRAMUSCULAR; INTRAVENOUS at 00:37

## 2023-11-02 RX ADMIN — ALBUMIN (HUMAN) 25 G: 12.5 INJECTION, SOLUTION INTRAVENOUS at 22:46

## 2023-11-02 RX ADMIN — MORPHINE SULFATE 2 MG: 2 INJECTION, SOLUTION INTRAMUSCULAR; INTRAVENOUS at 12:19

## 2023-11-02 RX ADMIN — IPRATROPIUM BROMIDE 0.5 MG: 0.5 SOLUTION RESPIRATORY (INHALATION) at 11:17

## 2023-11-02 RX ADMIN — SODIUM CHLORIDE, PRESERVATIVE FREE 10 ML: 5 INJECTION INTRAVENOUS at 08:42

## 2023-11-02 RX ADMIN — ASPIRIN 81 MG: 81 TABLET, COATED ORAL at 08:43

## 2023-11-02 RX ADMIN — IPRATROPIUM BROMIDE AND ALBUTEROL SULFATE 1 DOSE: .5; 2.5 SOLUTION RESPIRATORY (INHALATION) at 00:37

## 2023-11-02 RX ADMIN — HEPARIN SODIUM 5000 UNITS: 5000 INJECTION INTRAVENOUS; SUBCUTANEOUS at 06:15

## 2023-11-02 RX ADMIN — POLYETHYLENE GLYCOL 3350 17 G: 17 POWDER, FOR SOLUTION ORAL at 08:48

## 2023-11-02 RX ADMIN — SODIUM CHLORIDE 250 ML: 9 INJECTION, SOLUTION INTRAVENOUS at 10:00

## 2023-11-02 RX ADMIN — SODIUM CHLORIDE 1000 MG: 900 INJECTION INTRAVENOUS at 19:41

## 2023-11-02 RX ADMIN — METHYLPREDNISOLONE SODIUM SUCCINATE 40 MG: 40 INJECTION, POWDER, FOR SOLUTION INTRAMUSCULAR; INTRAVENOUS at 23:35

## 2023-11-02 RX ADMIN — CEFEPIME 2000 MG: 2 INJECTION, POWDER, FOR SOLUTION INTRAVENOUS at 06:13

## 2023-11-02 RX ADMIN — BUDESONIDE 500 MCG: 0.5 INHALANT RESPIRATORY (INHALATION) at 08:03

## 2023-11-02 ASSESSMENT — ENCOUNTER SYMPTOMS
EYES NEGATIVE: 1
COUGH: 1
SHORTNESS OF BREATH: 1
GASTROINTESTINAL NEGATIVE: 1

## 2023-11-02 NOTE — PROGRESS NOTES
[facility]  Hospitalist Progress Note    NAME: Cassandra Ball   :  1950   MRN:  [id number]     Total duration of encounter: 1 day      Interim Hospital Summary: 68 y.o. female who presented on 2023 with Acute respiratory failure with hypoxemia ([address]). She has a past medical history of Arthritis, Asthma, Chronic obstructive pulmonary disease ([address]), Chronic pain, Dyslipidemia, Hypertension, Ill-defined condition, Kidney stone, Long term current use of anticoagulant therapy, Lumbar disc disease, Lumbar spondylosis, Menopause, Migraine headache, Stroke ([address]), and Thyroid disease. Brea Lee Pt presented to ED with worsening SOB over few days. Acute RF with sats < 90, initially stable on 3 l/min. CTA neg PE , pos B pneum. Adm on Rocephin / Azith.  4 of 4 BC pos for GPC - Tx Switched to Vanc / Cefepime. ECHO pending. BP down to 90 sys this AM.  On high flow NC 50%. Feel needs Higher Level of Care - accepted to [address] [place] [address]. Subjective:     Chief Complaint / Reason for Physician Visit  \"SOB\". Discussed with RN   Weak, SOB  Sore with deep breath  No sputum    Review of Systems:  Symptom Y/N Comments  Symptom Y/N Comments   Fever/Chills  n   Chest Pain y  sore   Poor Appetite  n   Edema n    Cough  y   Abdominal Pain n    Sputum n   Joint Pain n    SOB/JEFEFRSON n   Pruritis/Rash n    Nausea/vomit n   Tolerating PT/OT     Diarrhea n   Tolerating Diet ?     Constipation n   Other         Current Facility-Administered Medications:     zolpidem (AMBIEN) tablet 10 mg, 10 mg, Oral, Nightly, Isas Diaz DO, 10 mg at 23 0038    ceFEPIme (MAXIPIME) 2,000 mg in sodium chloride 0.9 % 100 mL IVPB (mini-bag), 2,000 mg, IntraVENous, Q12H, Issa Diaz DO, Stopped at 23 0652    vancomycin (VANCOCIN) 2,000 mg in sodium chloride 0.9 % 500 mL IVPB, 25 mg/kg, IntraVENous, Once, Issa Diaz DO, Last Rate: 250 mL/hr at 23 0833, 2,000 mg at 23 0833    ipratropium 0.5 mg-albuterol

## 2023-11-02 NOTE — PROGRESS NOTES
Vital signs have improved on high flow o2. POX is now 95% with resp rate 28. She has finally fallen asleep.

## 2023-11-02 NOTE — PROGRESS NOTES
HOSPITALIST PROGRESS      Called for worsening SOB  Congested / coughing  CXR this AM read out B pneumonia, no CHF  Clinically seems to be in pulm edema  B rales  (was only noted on the L side earlier)  TX:  Advance FiO2  Resp Tx recommended BIPAP 14/6 with good tolerance / results  Lasix 80mg IV  Morphine 2mg IV  Stauffer    Additional hx is for tx by [address] on Monday 10/24 Macrobid  Consider SE Pulmonary Hypersensitivity / Interstitial Pneumonitis a/w Macrobid  Has been given Solumedrol 40mg IV earlier this AM    Transfer scheduled for 1300 via ICU Ambulance

## 2023-11-02 NOTE — ASSESSMENT & PLAN NOTE
Baseline Cr ~1.4; Cr 1.6 in ED; follow on labs  Baseline GFR ~37; GFR 33 in ED; follow on labs  Follow BUN/sCr, electrolytes, intake/output  Avoid nephrotoxic agents and renally adjust medications as appropriate

## 2023-11-02 NOTE — ED NOTES
Patient's SPO2 down to 86%, increased O2 to 5 lpm      Robi Reardon, [address]  11/01/23 2042

## 2023-11-02 NOTE — PROGRESS NOTES
Writer contacted [id number] [facility] and spoke with Juan Manuel Robbins to arrange ALS transport for this patient to [facility] ED. Requested information provided (Dx, wt, home address/ phone number, cardiac monitor, informed patient is on Bipap, saline lock, no isolation precautions and insurance information given) ETA 1430-- MILANA Carmona, RN made aware.

## 2023-11-02 NOTE — CARE COORDINATION
11/02/23 1427   [address] Discharge [address] Resource Information Provided? No   Mode of Transport at Discharge [address]  Time of Discharge 1430   Condition of Participation: Discharge Planning   The Plan for Transition of Care is related to the following treatment goals: Patient discharging to acute care facility:  [facility]   The Patient and/or Patient Representative was provided with a Choice of Provider? (NA)   Freedom of Choice list was provided with basic dialogue that supports the patient's individualized plan of care/goals, treatment preferences, and shares the quality data associated with the providers? No     Patient is being discharged to acute care, [facility] for higher level of care.

## 2023-11-02 NOTE — ASSESSMENT & PLAN NOTE
BNP 9462 on presentation  Known grade 1 DD  2D Echocardiogram in AM  Daily weights, strict intake and output  IV Lasix once and resume oral dose in am.  Monitor electrolytes, especially K  Consulted cardiology

## 2023-11-02 NOTE — H&P
V2.0  History and Physical      Name:  Rodri Mcnair /Age/Sex: 1950  (68 y.o. female)   MRN & CSN:  [id number] & [id number] Encounter Date/Time: 23 11:00 PM   Location:  128/01 PCP: Jose WEEKS MD       Hospital Day: 1    Assessment and Plan:   Rodri Mcnair is a 68 y.o. female with a pmh as below who presents with Acute respiratory failure with hypoxemia [facility]    Hospital Problems             Last Modified POA    * (Principal) Acute respiratory failure with hypoxemia ([address]) 2023 Yes    Sepsis ([address]) 2023 Yes    Multifocal pneumonia 2023 Yes    Chronic heart failure with preserved ejection fraction ([address]) 2023 Yes    Stage 3b chronic kidney disease (CKD) ([address]) 2023 Yes    Elevated troponin 2023 Yes    Anemia 2023 Yes    Hypertension 2023 Yes    Dyslipidemia 2023 Yes    Hyperlipidemia 2023 Yes    Primary hypothyroidism 2023 Yes       Plan:  Acute respiratory failure with hypoxemia (HCC)  Multifactorial 2/2 mild decompensated HFpEF and PNA  Treat underlying conditions as below  CTA chest: multilobar pna, most severely involving both upper lobes, with mild additional right LL involvement. No PE  Continuous cardiac monitoring  Check ABG's regularly  Continue nasal canula oxygen with goal of > 92% and consider NIPPV if fails to maintain saturations  HS troponins 176 -> 159  Check 2D echo as below      Sepsis (HCC)  Sepsis Criteria: Heart rate >90, RR >20 or PCO2 <32 and Source of infection: pna  Septic shock: Not present  MODS: Not present  QSOFA score: 1 ( Systolic blood pressure <=697OIRP: +0 for no, Altered mentation: +0 for no  and Respiratory rate >= 22/minute: +1 for yes )   Lactic acid pending  Monitor CBC  Monitor vitals per unit routine  Blood cultures and procal sent from ED  Tylenol PRN fever  Antibiotics: Rocephin and azithromycin    Anemia  Chronic disease or chronic renal disease?   Hgb 8 in ED, baseline hgb ~ 14 g/dL per EMR

## 2023-11-02 NOTE — ED NOTES
EMERGENCY DEPARTMENT HISTORY AND PHYSICAL EXAM      Date: 11/2/2023  Patient Name: Marcelino Green    History of Presenting Illness     Chief Complaint   Patient presents with    Shortness of Breath     Pt arrives as transfer from \[facility]\"" on BIPAP. Pt was dxd with CHF exacerbation and bilateral upper lobe pneumonia with positive blood cultures. Per [facility] pt failed hi flow test at [facility]. Pt desatted to 84% on RA on arrival         HPI: History From: patient, History limited by: none  Marcelino Green, 68 y.o. female with hx of CAD, HTN, CKD III, HLD, and hypothyroidism who presents in transfer via EMS from [facility] for escalation of care due to acute hypoxic respiratory failure. Patient was seen by her PMD yesterday afternoon for shortness of breath that has been ongoing for the past 3 to 4 weeks. In the office patient had O2 sat of 79%, was in a flutter with crackles with heart rate of 125. She was placed on 2 L nasal cannula with minimal improvement to the 80s, 3 L had SPO2 of 92%. Patient was then driven to [facility] ED where she was admitted for AHRF and sepsis and found to have bilateral apical pneumonia, positive UTI, and positive blood cultures for gram-positive cocci. She was started initially on Rocephin and azithromycin, after episode of hypotension with positive blood cultures patient was transitioned to cefepime and vancomycin. She was also found to have worsening anemia that appears to be consistent with anemia of chronic disease with elevated ferritin and low iron and TIBC. Patient had an episode of hypotension with blood pressure in the 90s early this a.m. and improved after 250 cc normal saline bolus and was transitioned to BiPAP. ECHO demonstrated normal left ventricular systolic function with a visually estimated EF of 60 - 65%. Left ventricle size is normal. Mildly increased wall thickness. Normal wall motion. Pt arrives to [facility] ED on HFNC.     There are no other complaints, changes, 11/2/23 2252)   albumin human 5% IV solution 25 g (25 g IntraVENous New Bag 11/2/23 2246)   norepinephrine (LEVOPHED) 16 mg in sodium chloride 0.9 % 250 mL infusion (has no administration in time range)        ED Course as of 11/02/23 2307   u Nov 02, 2023   1727 Patient evaluated by me at bedside appears toxic, acutely ill. Transfer from [address] [place] [address] for escalating level of care given patient's oxygen requirement. She is currently on high flow requiring 90% FiO2 with a flow rate of 50 to maintain sats greater than 90%. She states that her work of breathing has improved with this. Contacted ICU and they are agreeable with admission and they requested patient be given 1 L bolus of normal saline. Nursing made aware to monitor this judiciously and to discontinue that should patient have worsening respiratory status. [KP]   6061 With good pleth, pt was found to have a O2 sat in the 70's. Requested transition to BiPAP. Pt now satting comfortably. Will obtain ABG in 30 min after BiPAP therapy. [KP]      ED Course User Index  [KP] Gretta Floyd MD          Disposition:  ICU admission    PLAN:  1.       Medication List        ASK your doctor about these medications      albuterol (2.5 MG/3ML) 0.083% nebulizer solution  Commonly known as: PROVENTIL     amLODIPine 2.5 MG tablet  Commonly known as: NORVASC  TAKE 1 TABLET TWICE A DAY     aspirin 81 MG EC tablet     Breztri Aerosphere 160-9-4.8 MCG/ACT Aero  Generic drug: Budeson-Glycopyrrol-Formoterol     cyclobenzaprine 10 MG tablet  Commonly known as: FLEXERIL     diclofenac sodium 1 % Gel  Commonly known as: VOLTAREN     diphenhydrAMINE 25 MG capsule  Commonly known as: BENADRYL     furosemide 20 MG tablet  Commonly known as: LASIX     HYDROmorphone 2 MG tablet  Commonly known as: DILAUDID     montelukast 10 MG tablet  Commonly known as: SINGULAIR     nitrofurantoin (macrocrystal-monohydrate) 100 MG capsule  Commonly known as: MACROBID     ondansetron 4 MG

## 2023-11-02 NOTE — PROGRESS NOTES
Notified MD need order for transfer to PCU.  Patient was transferred in system to bed 204 now but went to PCU at 0230

## 2023-11-02 NOTE — CARE COORDINATION
Care Management Initial Assessment       RUR: 16% Medium  Readmission? No  1st IM letter given? Yes - 11/02/23 Renetta Agrawal CM  1st  letter given: No N/A       11/02/23 1145   Service Assessment   Patient Orientation Alert and Oriented;Person;Self;Situation;Place   Cognition Alert   History Provided By [facility]   Primary Caregiver Self   Support Systems Spouse/Significant Other   PCP Verified by CM Yes  (Dr. Divine Juarez MD, [facility], [facility])   Last Visit to PCP Within last 3 months   Prior Functional Level Independent in ADLs/IADLs   Current Functional Level Other (see comment)  (Unknown)   Can patient return to prior living arrangement Yes   Family able to assist with home care needs: Yes   Would you like for me to discuss the discharge plan with any other family members/significant others, and if so, who? Yes  (Emergency Contact Spouse Francene Dandy [phone])   Financial Resources Medicare   Community Resources None   Social/Functional History   Lives With Spouse   Type of [address] None   Active  Yes   Discharge Planning   Type of Residence Other (Comment)  (Higher Level of Care)   Current Services Prior To Admission None   Patient expects to be discharged to: Other (comment)  (Higher Level of Care)     Advance Care Planning     General Advance Care Planning (ACP) Conversation    Date of Conversation: 11/03/23    Conducted with: Spouse [address] Decision Maker:  No healthcare decision makers have been documented. Click here to complete [address] including selection of the Healthcare Decision Maker Relationship (ie \"Primary\")     Content/Action Overview:  Has NO ACP documents/care preferences - information provided, considering goals and options  Reviewed DNR/DNI and patient elects Full Code (Attempt Resuscitation)    Length of Voluntary ACP Conversation in minutes:  <16 minutes (Non-Billable)    ERIK Dennis          Mrs. Ortiz

## 2023-11-02 NOTE — DISCHARGE SUMMARY
[facility]  Hospitalist Discharge Summary    Patient ID:  Breanna Barry  [id number]  68 y.o.  1950    PCP on record: Nora Frey MD    Admit date: 11/1/2023  Discharge date and time: 11/2/2023     DISCHARGE DIAGNOSIS:    Principal Problem:    Acute respiratory failure with hypoxemia (HCC)  Active Problems:    COPD (chronic obstructive pulmonary disease) ([address])    Sepsis ([address])    Multifocal pneumonia    Bacteremia due to Gram-positive bacteria    Acute pulmonary edema (HCC)    Drug-induced pneumonitis    Hypertension    Stage 3b chronic kidney disease (CKD) (HCC)    Elevated troponin    Chronic heart failure with preserved ejection fraction ([address])    Anemia    Dyslipidemia    Primary hypothyroidism    CONSULTATIONS:  IP CONSULT TO CARDIOLOGY  IP CONSULT TO PHARMACY  IP CONSULT TO CARDIOLOGY  IP CONSULT TO PHARMACY  Cardiology deferred due to transfer in progress    Excerpted HPI from H&P of Liliana Cortez DO:  Breanna Barry is a 68 y.o. female with pmh as below who presents with complaints of shortness of breath at rest.  Symptoms include dyspnea on exertion and dyspnea when laying down. Symptoms began 5 days ago, gradually worsening since that time. Patient denies hemoptysis, fever, and chills. Associated symptoms include  mild LE edema, fatigue, generalized weakness, wheezing, and chest pain . Weight and abdominal girth remain unchanged. Symptoms are exacerbated by any exercise. Symptoms are alleviated by rest, oxygen, and albuterol. With regards to complaints of chest pain, she notes onset was 3 days ago, with unchanged course since that time. The patient describes the pain as constant,  tight  in nature, located between shoulder blades and without radiation. Aggravating factors are deep inspiration. Alleviating factors are: rest. Patient's cardiac risk factors are advanced age (older than 54 for men, 72 for women), dyslipidemia, hypertension, and smoking/ tobacco exposure.

## 2023-11-02 NOTE — ASSESSMENT & PLAN NOTE
Sepsis Criteria: Heart rate >90, RR >20 or PCO2 <32 and Source of infection: pna  Septic shock: Not present  MODS: Not present  QSOFA score: 1 ( Systolic blood pressure <=460XWAE: +0 for no, Altered mentation: +0 for no  and Respiratory rate >= 22/minute: +1 for yes )   Lactic acid pending  Monitor CBC  Monitor vitals per unit routine  Blood cultures and procal sent from ED  Tylenol PRN fever  Antibiotics: Rocephin and azithromycin

## 2023-11-02 NOTE — ASSESSMENT & PLAN NOTE
Imaging as above  Blood cultures and procal drawn in ED  Negative for influenza and covid  Nasal cannula oxygen prn  Cardiac monitoring and continuous pulse oximetry monitoring  Duonebs q4 prn  Mucinex daily  Tessalon pearls PRN  Tylenol prn for fever  Antibiotics: Rocephin and Azithromycin  Holding home pilocarpine during acute pna in the setting of COPD to reduce induced bronchspasm  Consider IV corticosteroids

## 2023-11-02 NOTE — PLAN OF CARE
Problem: Pain  Goal: Verbalizes/displays adequate comfort level or baseline comfort level  11/2/2023 0627 by Bhavna Lozano RN  Outcome: Progressing  11/2/2023 0627 by Bhavna Lozano RN  Outcome: Progressing     Problem: Safety - Adult  Goal: Free from fall injury  11/2/2023 0627 by Bhavna Lozano RN  Outcome: Progressing  11/2/2023 0627 by Bhavna Lozano RN  Outcome: Progressing

## 2023-11-02 NOTE — PROGRESS NOTES
-[address] Cardiology spoke with [place] . She will let Dr. Chris Nava know about the consult.

## 2023-11-02 NOTE — PROGRESS NOTES
Patient sat=85% on 4 lpm. Placed patient on humidified mid flow nasal cannula on 10 lpm. Per protocol to keep SAT's>= 90%. Sats 93% post 10 minutes. Placed patient on continuous pulse oximeter.

## 2023-11-02 NOTE — ASSESSMENT & PLAN NOTE
Chronic disease or chronic renal disease?   Hgb 8 in ED, baseline hgb ~ 14 g/dL per EMR (from 2022)  MCV 88 / MCHC 32.4  Does not appear to be actively bleeding  Monitor and Transfuse PRN to keep hgb > 7 g/dL,   Check Fe, Ferritin, TIBC levels, reticulocyte count, vitamin B12, folate levels  Consider checking stool for occult blood, LDH and haptoglobin to rule out hemolysis

## 2023-11-02 NOTE — ASSESSMENT & PLAN NOTE
Likely demand based and decreased renal clearance  Troponins as above  Continue home ASA & Beta blocker. Consider ACE inhibitor.  Allergy to statin  SL nitro prn  Nasal cannula oxygen prn  Check lipid panel and A1c  2D Echo in [facility] cardiology

## 2023-11-02 NOTE — PROGRESS NOTES
Received a call from LAB stating patient has gram positive cocci in chains in all 4 blood culture bottles. Dr Liam Irizarry made aware and new orders have been placed to change antibiotics.

## 2023-11-02 NOTE — H&P
CRITICAL CARE NOTE      Name: Liya Ramey   : 1950   MRN: [id number]   Date: 2023      REASON FOR ICU ADMISSION:  Acute hypoxic respiratory failure     PRINCIPAL ICU DIAGNOSIS   Acute hypoxic respiratory failure  Bilateral Apical pneumonia  Gram-positive bacteremia  Acute kidney injury  Elevated troponin  BRIEF PATIENT SUMMARY   67 y/o female PMHx of CAD, HTN, CKD III, HLD and hypothyroidism transferred from [facility] for management of acute hypoxic respiratory failure in the setting of bilateral apical pneumonia. She was admitted at [facility] on  after presenting with dyspnea on exertion and worsening generalized weakness/fatigue from PCP, with Spo2 79 %. CXR with bilateral apical PNA, BC positive for GPCs  Ceftriaxone/azithromycin broadened to cefepime/ vancomycin, worsening hypoxia requiring HFNC/BiPaP and hypotension. Transferred to [address] [place] [address] for further management. ICU Evaluation:   Further Hx obtained. Onset of generalized fatigue ~10/16, on 10/23 she was seen at urologist and started on antibiotics for UTI. Over the next 1.5 weeks worsening fatigue, with progressive dyspnea, presented to ED from PCP () noted to have Spo2 79 %. Of note:  Per [employer] BC + enterococcus faecalis,Echo from [facility] demonstrating EF 60 to 65% with normal wall motion, aortic valve not well visualized with thickened cusp vegetation cannot be excluded    COMPREHENSIVE ASSESSMENT & PLAN:SYSTEM BASED   May require KARISSA to r/o endocarditis with +enterococcus faecalis bacteremia and limited AV visualization on TTE, repeat BC in AM, may require KARISSA.    Requiring BiPap 16/10, 70 %, Repeat CXR and ABG in AM    NEUROLOGICAL:   No active issues  Routine neurological monitoring, delirium precautions    PULMONOLOGY:   Acute hypoxic respiratory failure in setting of bilateral apical pneumonia +/- COPD exacerbation  Requiring BiPap 16/10, 70 %, Repeat CXR and ABG in AM  : CXR: Severe bilateral apical pneumonia (of note OP chest

## 2023-11-02 NOTE — ASSESSMENT & PLAN NOTE
Multifactorial 2/2 mild decompensated HFpEF and PNA  Treat underlying conditions as below  CTA chest: multilobar pna, most severely involving both upper lobes, with mild additional right LL involvement.  No PE  Continuous cardiac monitoring  Check ABG's regularly  Continue nasal canula oxygen with goal of > 92% and consider NIPPV if fails to maintain saturations  HS troponins 176 -> 159  Check 2D echo as below

## 2023-11-03 ENCOUNTER — APPOINTMENT (OUTPATIENT)
Facility: HOSPITAL | Age: 73
DRG: 853 | End: 2023-11-03
Payer: MEDICARE

## 2023-11-03 PROBLEM — R73.9 STEROID-INDUCED HYPERGLYCEMIA: Status: ACTIVE | Noted: 2023-11-03

## 2023-11-03 PROBLEM — I95.9 SEPSIS ASSOCIATED HYPOTENSION (HCC): Status: ACTIVE | Noted: 2023-11-01

## 2023-11-03 PROBLEM — T38.0X5A STEROID-INDUCED HYPERGLYCEMIA: Status: ACTIVE | Noted: 2023-11-03

## 2023-11-03 LAB
ALBUMIN SERPL-MCNC: 2.3 G/DL (ref 3.5–5)
ALBUMIN/GLOB SERPL: 0.6 (ref 1.1–2.2)
ALP SERPL-CCNC: 69 U/L (ref 45–117)
ALT SERPL-CCNC: 21 U/L (ref 12–78)
ANION GAP SERPL CALC-SCNC: 15 MMOL/L (ref 5–15)
ANION GAP SERPL CALC-SCNC: 7 MMOL/L (ref 5–15)
ANION GAP SERPL CALC-SCNC: 8 MMOL/L (ref 5–15)
ARTERIAL PATENCY WRIST A: ABNORMAL
AST SERPL-CCNC: 27 U/L (ref 15–37)
BASE DEFICIT BLDA-SCNC: 10.4 MMOL/L
BASE DEFICIT BLDA-SCNC: 11 MMOL/L
BASE DEFICIT BLDA-SCNC: 17.4 MMOL/L
BASE DEFICIT BLDA-SCNC: 5.3 MMOL/L
BASE DEFICIT BLDA-SCNC: 5.8 MMOL/L
BASE DEFICIT BLDA-SCNC: 9 MMOL/L
BDY SITE: ABNORMAL
BILIRUB DIRECT SERPL-MCNC: 0.3 MG/DL (ref 0–0.2)
BILIRUB SERPL-MCNC: 0.5 MG/DL (ref 0.2–1)
BUN SERPL-MCNC: 34 MG/DL (ref 6–20)
BUN SERPL-MCNC: 35 MG/DL (ref 6–20)
BUN SERPL-MCNC: 35 MG/DL (ref 6–20)
BUN/CREAT SERPL: 18 (ref 12–20)
BUN/CREAT SERPL: 18 (ref 12–20)
BUN/CREAT SERPL: 19 (ref 12–20)
CALCIUM SERPL-MCNC: 7.7 MG/DL (ref 8.5–10.1)
CALCIUM SERPL-MCNC: 7.7 MG/DL (ref 8.5–10.1)
CALCIUM SERPL-MCNC: 8.1 MG/DL (ref 8.5–10.1)
CHLORIDE SERPL-SCNC: 103 MMOL/L (ref 97–108)
CHLORIDE SERPL-SCNC: 105 MMOL/L (ref 97–108)
CHLORIDE SERPL-SCNC: 106 MMOL/L (ref 97–108)
CO2 SERPL-SCNC: 16 MMOL/L (ref 21–32)
CO2 SERPL-SCNC: 23 MMOL/L (ref 21–32)
CO2 SERPL-SCNC: 24 MMOL/L (ref 21–32)
CREAT SERPL-MCNC: 1.88 MG/DL (ref 0.55–1.02)
CREAT SERPL-MCNC: 1.9 MG/DL (ref 0.55–1.02)
CREAT SERPL-MCNC: 1.94 MG/DL (ref 0.55–1.02)
EKG ATRIAL RATE: 92 BPM
EKG DIAGNOSIS: NORMAL
EKG P AXIS: 34 DEGREES
EKG P-R INTERVAL: 218 MS
EKG Q-T INTERVAL: 370 MS
EKG QRS DURATION: 78 MS
EKG QTC CALCULATION (BAZETT): 457 MS
EKG R AXIS: 7 DEGREES
EKG T AXIS: 24 DEGREES
EKG VENTRICULAR RATE: 92 BPM
ERYTHROCYTE [DISTWIDTH] IN BLOOD BY AUTOMATED COUNT: 15.4 % (ref 11.5–14.5)
FIO2 ON VENT: 100 %
FIO2 ON VENT: 60 %
FIO2 ON VENT: 90 %
GAS FLOW.O2 SETTING OXYMISER: 18
GAS FLOW.O2 SETTING OXYMISER: 28
GAS FLOW.O2 SETTING OXYMISER: 30
GLOBULIN SER CALC-MCNC: 4 G/DL (ref 2–4)
GLUCOSE BLD STRIP.AUTO-MCNC: 143 MG/DL (ref 65–117)
GLUCOSE BLD STRIP.AUTO-MCNC: 177 MG/DL (ref 65–117)
GLUCOSE BLD STRIP.AUTO-MCNC: 179 MG/DL (ref 65–117)
GLUCOSE BLD STRIP.AUTO-MCNC: 260 MG/DL (ref 65–117)
GLUCOSE BLD STRIP.AUTO-MCNC: 356 MG/DL (ref 65–117)
GLUCOSE BLD STRIP.AUTO-MCNC: 357 MG/DL (ref 65–117)
GLUCOSE SERPL-MCNC: 131 MG/DL (ref 65–100)
GLUCOSE SERPL-MCNC: 298 MG/DL (ref 65–100)
GLUCOSE SERPL-MCNC: 330 MG/DL (ref 65–100)
HCO3 BLDA-SCNC: 14 MMOL/L (ref 22–26)
HCO3 BLDA-SCNC: 19 MMOL/L (ref 22–26)
HCO3 BLDA-SCNC: 21 MMOL/L (ref 22–26)
HCT VFR BLD AUTO: 23.4 % (ref 35–47)
HGB BLD-MCNC: 7.3 G/DL (ref 11.5–16)
INR PPP: 1.2 (ref 0.9–1.1)
LACTATE SERPL-SCNC: 0.9 MMOL/L (ref 0.4–2)
LACTATE SERPL-SCNC: 1.5 MMOL/L (ref 0.4–2)
LACTATE SERPL-SCNC: 1.9 MMOL/L (ref 0.4–2)
LACTATE SERPL-SCNC: 4.1 MMOL/L (ref 0.4–2)
MAGNESIUM SERPL-MCNC: 2.6 MG/DL (ref 1.6–2.4)
MCH RBC QN AUTO: 28.6 PG (ref 26–34)
MCHC RBC AUTO-ENTMCNC: 31.2 G/DL (ref 30–36.5)
MCV RBC AUTO: 91.8 FL (ref 80–99)
NRBC # BLD: 0 K/UL (ref 0–0.01)
NRBC BLD-RTO: 0 PER 100 WBC
PCO2 BLDA: 35 MMHG (ref 35–45)
PCO2 BLDA: 46 MMHG (ref 35–45)
PCO2 BLDA: 50 MMHG (ref 35–45)
PCO2 BLDA: 54 MMHG (ref 35–45)
PCO2 BLDA: 56 MMHG (ref 35–45)
PCO2 BLDA: 58 MMHG (ref 35–45)
PEEP RESPIRATORY: 15
PH BLDA: 7.02 (ref 7.35–7.45)
PH BLDA: 7.14 (ref 7.35–7.45)
PH BLDA: 7.14 (ref 7.35–7.45)
PH BLDA: 7.2 (ref 7.35–7.45)
PH BLDA: 7.28 (ref 7.35–7.45)
PH BLDA: 7.36 (ref 7.35–7.45)
PHOSPHATE SERPL-MCNC: 8.1 MG/DL (ref 2.6–4.7)
PLATELET # BLD AUTO: 194 K/UL (ref 150–400)
PMV BLD AUTO: 11 FL (ref 8.9–12.9)
PO2 BLDA: 100 MMHG (ref 80–100)
PO2 BLDA: 134 MMHG (ref 80–100)
PO2 BLDA: 171 MMHG (ref 80–100)
PO2 BLDA: 184 MMHG (ref 80–100)
PO2 BLDA: 55 MMHG (ref 80–100)
PO2 BLDA: 97 MMHG (ref 80–100)
POTASSIUM SERPL-SCNC: 4 MMOL/L (ref 3.5–5.1)
POTASSIUM SERPL-SCNC: 4.1 MMOL/L (ref 3.5–5.1)
POTASSIUM SERPL-SCNC: 4.2 MMOL/L (ref 3.5–5.1)
PROCALCITONIN SERPL-MCNC: 1.32 NG/ML
PROT SERPL-MCNC: 6.3 G/DL (ref 6.4–8.2)
PROTHROMBIN TIME: 12.4 SEC (ref 9–11.1)
RBC # BLD AUTO: 2.55 M/UL (ref 3.8–5.2)
SAO2 % BLD: 73 % (ref 92–97)
SAO2 % BLD: 96 % (ref 92–97)
SAO2 % BLD: 97 % (ref 92–97)
SAO2 % BLD: 99 % (ref 92–97)
SAO2% DEVICE SAO2% SENSOR NAME: ABNORMAL
SERVICE CMNT-IMP: ABNORMAL
SODIUM SERPL-SCNC: 134 MMOL/L (ref 136–145)
SODIUM SERPL-SCNC: 136 MMOL/L (ref 136–145)
SODIUM SERPL-SCNC: 137 MMOL/L (ref 136–145)
SPECIMEN SITE: ABNORMAL
VANCOMYCIN SERPL-MCNC: 18.5 UG/ML
VENTILATION MODE VENT: ABNORMAL
VT SETTING VENT: 275
VT SETTING VENT: 300
WBC # BLD AUTO: 10.5 K/UL (ref 3.6–11)

## 2023-11-03 PROCEDURE — 6360000002 HC RX W HCPCS: Performed by: HOSPITALIST

## 2023-11-03 PROCEDURE — 71045 X-RAY EXAM CHEST 1 VIEW: CPT

## 2023-11-03 PROCEDURE — 99222 1ST HOSP IP/OBS MODERATE 55: CPT | Performed by: CLINICAL NURSE SPECIALIST

## 2023-11-03 PROCEDURE — 94640 AIRWAY INHALATION TREATMENT: CPT

## 2023-11-03 PROCEDURE — 87077 CULTURE AEROBIC IDENTIFY: CPT

## 2023-11-03 PROCEDURE — 6370000000 HC RX 637 (ALT 250 FOR IP): Performed by: NURSE PRACTITIONER

## 2023-11-03 PROCEDURE — 87186 SC STD MICRODIL/AGAR DIL: CPT

## 2023-11-03 PROCEDURE — 6370000000 HC RX 637 (ALT 250 FOR IP): Performed by: STUDENT IN AN ORGANIZED HEALTH CARE EDUCATION/TRAINING PROGRAM

## 2023-11-03 PROCEDURE — 94660 CPAP INITIATION&MGMT: CPT

## 2023-11-03 PROCEDURE — 02HV33Z INSERTION OF INFUSION DEVICE INTO SUPERIOR VENA CAVA, PERCUTANEOUS APPROACH: ICD-10-PCS | Performed by: HOSPITALIST

## 2023-11-03 PROCEDURE — 80048 BASIC METABOLIC PNL TOTAL CA: CPT

## 2023-11-03 PROCEDURE — 36620 INSERTION CATHETER ARTERY: CPT

## 2023-11-03 PROCEDURE — 6370000000 HC RX 637 (ALT 250 FOR IP): Performed by: HOSPITALIST

## 2023-11-03 PROCEDURE — 36415 COLL VENOUS BLD VENIPUNCTURE: CPT

## 2023-11-03 PROCEDURE — 82803 BLOOD GASES ANY COMBINATION: CPT

## 2023-11-03 PROCEDURE — 94002 VENT MGMT INPAT INIT DAY: CPT

## 2023-11-03 PROCEDURE — 84145 PROCALCITONIN (PCT): CPT

## 2023-11-03 PROCEDURE — 94761 N-INVAS EAR/PLS OXIMETRY MLT: CPT

## 2023-11-03 PROCEDURE — 87154 CUL TYP ID BLD PTHGN 6+ TRGT: CPT

## 2023-11-03 PROCEDURE — 87205 SMEAR GRAM STAIN: CPT

## 2023-11-03 PROCEDURE — 87070 CULTURE OTHR SPECIMN AEROBIC: CPT

## 2023-11-03 PROCEDURE — 80202 ASSAY OF VANCOMYCIN: CPT

## 2023-11-03 PROCEDURE — 36556 INSERT NON-TUNNEL CV CATH: CPT

## 2023-11-03 PROCEDURE — 6360000002 HC RX W HCPCS: Performed by: NURSE PRACTITIONER

## 2023-11-03 PROCEDURE — 83735 ASSAY OF MAGNESIUM: CPT

## 2023-11-03 PROCEDURE — 85027 COMPLETE CBC AUTOMATED: CPT

## 2023-11-03 PROCEDURE — A4216 STERILE WATER/SALINE, 10 ML: HCPCS | Performed by: HOSPITALIST

## 2023-11-03 PROCEDURE — 85610 PROTHROMBIN TIME: CPT

## 2023-11-03 PROCEDURE — 82962 GLUCOSE BLOOD TEST: CPT

## 2023-11-03 PROCEDURE — 2580000003 HC RX 258: Performed by: NURSE PRACTITIONER

## 2023-11-03 PROCEDURE — 2000000000 HC ICU R&B

## 2023-11-03 PROCEDURE — 83605 ASSAY OF LACTIC ACID: CPT

## 2023-11-03 PROCEDURE — 6370000000 HC RX 637 (ALT 250 FOR IP): Performed by: CLINICAL NURSE SPECIALIST

## 2023-11-03 PROCEDURE — 2700000000 HC OXYGEN THERAPY PER DAY

## 2023-11-03 PROCEDURE — 2500000003 HC RX 250 WO HCPCS: Performed by: HOSPITALIST

## 2023-11-03 PROCEDURE — 37799 UNLISTED PX VASCULAR SURGERY: CPT

## 2023-11-03 PROCEDURE — 87040 BLOOD CULTURE FOR BACTERIA: CPT

## 2023-11-03 PROCEDURE — A4216 STERILE WATER/SALINE, 10 ML: HCPCS | Performed by: NURSE PRACTITIONER

## 2023-11-03 PROCEDURE — 31500 INSERT EMERGENCY AIRWAY: CPT

## 2023-11-03 PROCEDURE — 6360000002 HC RX W HCPCS

## 2023-11-03 PROCEDURE — 84100 ASSAY OF PHOSPHORUS: CPT

## 2023-11-03 PROCEDURE — P9047 ALBUMIN (HUMAN), 25%, 50ML: HCPCS | Performed by: HOSPITALIST

## 2023-11-03 PROCEDURE — 2500000003 HC RX 250 WO HCPCS: Performed by: NURSE PRACTITIONER

## 2023-11-03 PROCEDURE — 2580000003 HC RX 258: Performed by: HOSPITALIST

## 2023-11-03 PROCEDURE — 80076 HEPATIC FUNCTION PANEL: CPT

## 2023-11-03 PROCEDURE — C9113 INJ PANTOPRAZOLE SODIUM, VIA: HCPCS | Performed by: NURSE PRACTITIONER

## 2023-11-03 RX ORDER — INSULIN LISPRO 100 [IU]/ML
10 INJECTION, SOLUTION INTRAVENOUS; SUBCUTANEOUS ONCE
Status: COMPLETED | OUTPATIENT
Start: 2023-11-03 | End: 2023-11-03

## 2023-11-03 RX ORDER — FENTANYL CITRATE-0.9 % NACL/PF 10 MCG/ML
25-200 PLASTIC BAG, INJECTION (ML) INTRAVENOUS CONTINUOUS
Status: DISCONTINUED | OUTPATIENT
Start: 2023-11-03 | End: 2023-11-09 | Stop reason: HOSPADM

## 2023-11-03 RX ORDER — ENOXAPARIN SODIUM 100 MG/ML
30 INJECTION SUBCUTANEOUS DAILY
Status: DISCONTINUED | OUTPATIENT
Start: 2023-11-03 | End: 2023-11-09 | Stop reason: HOSPADM

## 2023-11-03 RX ORDER — PROPOFOL 10 MG/ML
INJECTION, EMULSION INTRAVENOUS
Status: COMPLETED
Start: 2023-11-03 | End: 2023-11-03

## 2023-11-03 RX ORDER — CHLORHEXIDINE GLUCONATE ORAL RINSE 1.2 MG/ML
15 SOLUTION DENTAL 2 TIMES DAILY
Status: DISCONTINUED | OUTPATIENT
Start: 2023-11-03 | End: 2023-11-09 | Stop reason: HOSPADM

## 2023-11-03 RX ORDER — FUROSEMIDE 10 MG/ML
40 INJECTION INTRAMUSCULAR; INTRAVENOUS ONCE
Status: COMPLETED | OUTPATIENT
Start: 2023-11-03 | End: 2023-11-03

## 2023-11-03 RX ORDER — INSULIN LISPRO 100 [IU]/ML
0-8 INJECTION, SOLUTION INTRAVENOUS; SUBCUTANEOUS EVERY 4 HOURS
Status: DISCONTINUED | OUTPATIENT
Start: 2023-11-03 | End: 2023-11-06

## 2023-11-03 RX ORDER — ETOMIDATE 2 MG/ML
20 INJECTION INTRAVENOUS ONCE
Status: COMPLETED | OUTPATIENT
Start: 2023-11-03 | End: 2023-11-03

## 2023-11-03 RX ORDER — ROCURONIUM BROMIDE 10 MG/ML
100 INJECTION, SOLUTION INTRAVENOUS ONCE
Status: COMPLETED | OUTPATIENT
Start: 2023-11-03 | End: 2023-11-03

## 2023-11-03 RX ORDER — CASTOR OIL AND BALSAM, PERU 788; 87 MG/G; MG/G
OINTMENT TOPICAL 2 TIMES DAILY
Status: DISCONTINUED | OUTPATIENT
Start: 2023-11-03 | End: 2023-11-09 | Stop reason: HOSPADM

## 2023-11-03 RX ORDER — ALBUMIN (HUMAN) 12.5 G/50ML
50 SOLUTION INTRAVENOUS ONCE
Status: COMPLETED | OUTPATIENT
Start: 2023-11-03 | End: 2023-11-03

## 2023-11-03 RX ORDER — LEVOTHYROXINE SODIUM 0.05 MG/1
25 TABLET ORAL DAILY
Status: DISCONTINUED | OUTPATIENT
Start: 2023-11-04 | End: 2023-11-07

## 2023-11-03 RX ORDER — SODIUM CHLORIDE, SODIUM LACTATE, POTASSIUM CHLORIDE, CALCIUM CHLORIDE 600; 310; 30; 20 MG/100ML; MG/100ML; MG/100ML; MG/100ML
INJECTION, SOLUTION INTRAVENOUS CONTINUOUS
Status: DISCONTINUED | OUTPATIENT
Start: 2023-11-03 | End: 2023-11-04

## 2023-11-03 RX ORDER — ALBUMIN (HUMAN) 12.5 G/50ML
100 SOLUTION INTRAVENOUS EVERY 6 HOURS
Status: DISCONTINUED | OUTPATIENT
Start: 2023-11-03 | End: 2023-11-03 | Stop reason: DRUGHIGH

## 2023-11-03 RX ORDER — PROPOFOL 10 MG/ML
5-50 INJECTION, EMULSION INTRAVENOUS CONTINUOUS
Status: DISCONTINUED | OUTPATIENT
Start: 2023-11-03 | End: 2023-11-06

## 2023-11-03 RX ORDER — SODIUM CHLORIDE, SODIUM LACTATE, POTASSIUM CHLORIDE, AND CALCIUM CHLORIDE .6; .31; .03; .02 G/100ML; G/100ML; G/100ML; G/100ML
1000 INJECTION, SOLUTION INTRAVENOUS ONCE
Status: COMPLETED | OUTPATIENT
Start: 2023-11-03 | End: 2023-11-03

## 2023-11-03 RX ORDER — IPRATROPIUM BROMIDE AND ALBUTEROL SULFATE 2.5; .5 MG/3ML; MG/3ML
1 SOLUTION RESPIRATORY (INHALATION)
Status: ACTIVE | OUTPATIENT
Start: 2023-11-03 | End: 2023-11-03

## 2023-11-03 RX ADMIN — INSULIN LISPRO 4 UNITS: 100 INJECTION, SOLUTION INTRAVENOUS; SUBCUTANEOUS at 11:42

## 2023-11-03 RX ADMIN — PROPOFOL 20 MCG/KG/MIN: 10 INJECTION, EMULSION INTRAVENOUS at 04:45

## 2023-11-03 RX ADMIN — IPRATROPIUM BROMIDE AND ALBUTEROL SULFATE 1 DOSE: 2.5; .5 SOLUTION RESPIRATORY (INHALATION) at 04:23

## 2023-11-03 RX ADMIN — Medication 5 MCG/MIN: at 06:19

## 2023-11-03 RX ADMIN — SODIUM CHLORIDE 18.75 MCG: 9 INJECTION INTRAMUSCULAR; INTRAVENOUS; SUBCUTANEOUS at 15:49

## 2023-11-03 RX ADMIN — CHLORHEXIDINE GLUCONATE 15 ML: 1.2 RINSE ORAL at 11:00

## 2023-11-03 RX ADMIN — AZITHROMYCIN MONOHYDRATE 500 MG: 500 INJECTION, POWDER, LYOPHILIZED, FOR SOLUTION INTRAVENOUS at 19:53

## 2023-11-03 RX ADMIN — METHYLPREDNISOLONE SODIUM SUCCINATE 40 MG: 40 INJECTION, POWDER, FOR SOLUTION INTRAMUSCULAR; INTRAVENOUS at 05:40

## 2023-11-03 RX ADMIN — INSULIN LISPRO 8 UNITS: 100 INJECTION, SOLUTION INTRAVENOUS; SUBCUTANEOUS at 07:09

## 2023-11-03 RX ADMIN — Medication 1 AMPULE: at 21:41

## 2023-11-03 RX ADMIN — Medication: at 21:09

## 2023-11-03 RX ADMIN — ROCURONIUM BROMIDE 100 MG: 10 INJECTION INTRAVENOUS at 04:48

## 2023-11-03 RX ADMIN — INSULIN LISPRO 10 UNITS: 100 INJECTION, SOLUTION INTRAVENOUS; SUBCUTANEOUS at 07:35

## 2023-11-03 RX ADMIN — IPRATROPIUM BROMIDE AND ALBUTEROL SULFATE 1 DOSE: 2.5; .5 SOLUTION RESPIRATORY (INHALATION) at 23:10

## 2023-11-03 RX ADMIN — ENOXAPARIN SODIUM 30 MG: 100 INJECTION SUBCUTANEOUS at 11:00

## 2023-11-03 RX ADMIN — IPRATROPIUM BROMIDE AND ALBUTEROL SULFATE 1 DOSE: 2.5; .5 SOLUTION RESPIRATORY (INHALATION) at 19:17

## 2023-11-03 RX ADMIN — CHLORHEXIDINE GLUCONATE 15 ML: 1.2 RINSE ORAL at 21:00

## 2023-11-03 RX ADMIN — CISATRACURIUM BESYLATE 2 MCG/KG/MIN: 10 INJECTION, SOLUTION INTRAVENOUS at 06:10

## 2023-11-03 RX ADMIN — IPRATROPIUM BROMIDE AND ALBUTEROL SULFATE 1 DOSE: 2.5; .5 SOLUTION RESPIRATORY (INHALATION) at 07:52

## 2023-11-03 RX ADMIN — SODIUM CHLORIDE, PRESERVATIVE FREE 10 ML: 5 INJECTION INTRAVENOUS at 21:00

## 2023-11-03 RX ADMIN — SODIUM CHLORIDE 1000 MG: 9 INJECTION INTRAMUSCULAR; INTRAVENOUS; SUBCUTANEOUS at 05:36

## 2023-11-03 RX ADMIN — Medication 50 MCG/HR: at 05:16

## 2023-11-03 RX ADMIN — IPRATROPIUM BROMIDE AND ALBUTEROL SULFATE 1 DOSE: 2.5; .5 SOLUTION RESPIRATORY (INHALATION) at 04:20

## 2023-11-03 RX ADMIN — SODIUM CHLORIDE, POTASSIUM CHLORIDE, SODIUM LACTATE AND CALCIUM CHLORIDE: 600; 310; 30; 20 INJECTION, SOLUTION INTRAVENOUS at 11:01

## 2023-11-03 RX ADMIN — SODIUM CHLORIDE, POTASSIUM CHLORIDE, SODIUM LACTATE AND CALCIUM CHLORIDE 1000 ML: 600; 310; 30; 20 INJECTION, SOLUTION INTRAVENOUS at 07:35

## 2023-11-03 RX ADMIN — PROPOFOL 35 MCG/KG/MIN: 10 INJECTION, EMULSION INTRAVENOUS at 17:42

## 2023-11-03 RX ADMIN — SODIUM CHLORIDE, PRESERVATIVE FREE 10 ML: 5 INJECTION INTRAVENOUS at 08:53

## 2023-11-03 RX ADMIN — SODIUM CHLORIDE 40 MG: 9 INJECTION INTRAMUSCULAR; INTRAVENOUS; SUBCUTANEOUS at 05:31

## 2023-11-03 RX ADMIN — INSULIN HUMAN 7 UNITS: 100 INJECTION, SUSPENSION SUBCUTANEOUS at 14:13

## 2023-11-03 RX ADMIN — IPRATROPIUM BROMIDE AND ALBUTEROL SULFATE 1 DOSE: 2.5; .5 SOLUTION RESPIRATORY (INHALATION) at 15:36

## 2023-11-03 RX ADMIN — IPRATROPIUM BROMIDE AND ALBUTEROL SULFATE 1 DOSE: 2.5; .5 SOLUTION RESPIRATORY (INHALATION) at 11:15

## 2023-11-03 RX ADMIN — SODIUM CHLORIDE, POTASSIUM CHLORIDE, SODIUM LACTATE AND CALCIUM CHLORIDE: 600; 310; 30; 20 INJECTION, SOLUTION INTRAVENOUS at 16:13

## 2023-11-03 RX ADMIN — SODIUM CHLORIDE, POTASSIUM CHLORIDE, SODIUM LACTATE AND CALCIUM CHLORIDE 1000 ML: 600; 310; 30; 20 INJECTION, SOLUTION INTRAVENOUS at 08:53

## 2023-11-03 RX ADMIN — ETOMIDATE 20 MG: 2 INJECTION, SOLUTION INTRAVENOUS at 04:05

## 2023-11-03 RX ADMIN — Medication 100 MCG/HR: at 17:43

## 2023-11-03 RX ADMIN — CEFTRIAXONE SODIUM 2000 MG: 2 INJECTION, POWDER, FOR SOLUTION INTRAMUSCULAR; INTRAVENOUS at 10:59

## 2023-11-03 RX ADMIN — VANCOMYCIN HYDROCHLORIDE 750 MG: 750 INJECTION, POWDER, LYOPHILIZED, FOR SOLUTION INTRAVENOUS at 07:59

## 2023-11-03 RX ADMIN — ALBUMIN (HUMAN) 50 G: 0.25 INJECTION, SOLUTION INTRAVENOUS at 15:38

## 2023-11-03 RX ADMIN — PROPOFOL 35 MCG/KG/MIN: 10 INJECTION, EMULSION INTRAVENOUS at 13:10

## 2023-11-03 RX ADMIN — Medication 1 AMPULE: at 10:58

## 2023-11-03 RX ADMIN — METHYLPREDNISOLONE SODIUM SUCCINATE 40 MG: 40 INJECTION, POWDER, FOR SOLUTION INTRAMUSCULAR; INTRAVENOUS at 17:24

## 2023-11-03 RX ADMIN — FUROSEMIDE 40 MG: 10 INJECTION, SOLUTION INTRAMUSCULAR; INTRAVENOUS at 06:44

## 2023-11-03 ASSESSMENT — PAIN SCALES - GENERAL
PAINLEVEL_OUTOF10: 0

## 2023-11-03 ASSESSMENT — PULMONARY FUNCTION TESTS
PIF_VALUE: 32
PIF_VALUE: 27
PIF_VALUE: 29
PIF_VALUE: 27
PIF_VALUE: 29

## 2023-11-03 NOTE — PROGRESS NOTES
Put patient on 100% for taking her out of the prone position. Change to tube gross. ETT at 22 at the teeth.

## 2023-11-03 NOTE — DIABETES MGMT
[facility]  DIABETES MANAGEMENT CONSULT    Consulted by  Madisyn Clark MD  for advanced nursing evaluation and care for inpatient blood glucose management. Evaluation and Action Plan   Fermin Salinas is a 68year old female, with no history of diabetes, who is admitted with bacteremia, bilateral pneumonia and acute kidney injury. Initial labs were significant for creat 1.66, eGFR 33, Glucose 130, BNP 9462, Trop 176. She has been intubated, started on antibiotics, vasopressors and glucocorticoids. The Program for Diabetes Health has been consulted to assist in glycemic management and advanced diabetes management assessment this admission. Stevo Corrigan does not have diabetes, A1C normal at 5.4% at admission along with mildly elevated glucose at 128. High dose methylprednisolone- 40mg Q6 started yesterday. Between vasopressor use, severe illness and steroid impact, glucose trended to over 356. Q4 correctional insulin has been started. She would benefit from low dose basal insulin to address hyperglycemia and suspect this is to be weaned off as she stabilizes and steroids weaned. Management Rationale Action Plan   Medication   Corrective insulin Using medium sensitivity  Q4h. Can wean to Q6h once BG stable     Overriding steroid effect Using 0.2 units/kg/D Start low dose NPH to offset steroid impact. 7 units NPH Q12. Wean by 20% if glucose trending below 140mg/dl. Expect to wean this to off with steroids and clinical recovery. Likewise, if dose not sufficient to control glucose, advance to 0.15units/kg Q12   Additional orders  Wean steroids when able          Initial Presentation   Jonny Ball is a 68 y.o. female who presented to [facility] ED 11/1/23 with a c/o SOB and cough. LAB: creat 1.66, eGFR 33, Glucose 130, BNP 9462, Trop 176  CXR:  CTA CHEST W WO CONTRAST PE Eval   Final Result       1. No evidence of pulmonary embolism.    2. Multilobar pneumonia, most

## 2023-11-03 NOTE — PROGRESS NOTES
Bedside and Verbal shift change report received from ESTEFANÍA Zafar RN(offgoing nurse). Report included the following information Nurse Handoff Report, Index, ED SBAR, Adult Overview, Intake/Output, MAR, Recent Results, Med Rec Status, Cardiac Rhythm sinus tachycardia, Alarm Parameters, Quality Measures, and Neuro Assessment. Noted proned, tolerating the current mechanical ventilator settings well. Sedated with Propofol and Fentanyl. The blood pressure is being supported with Levophed at 10 mcq/min  0830:Her  and son are at the bedside.  in giving updates to them, see new orders to infuse a liter of LR. Mamadou Del Angel returned the call. 0930: Interdisciplinary rounds completed, see new orders. 1100:Her granddaughter Rodrigo Castanon is at the bedside. She would like the doctor to walk her through the events that occurred over night. 1120:Awake, moving her head from the right side, to the left side. Kian Fisherman in. The  Propofol is being titrated for more sedation. 1140: The Propofol and Fentanyl are increased for more sedation  1200: She is less agitated. Repoitioned for comfort. Suctioned for a small amount of thin blood tinge sputum. Her family remains at the bedside. 1530:Ry Rowe phone number is   [phone], he is leaving with his Father. 1630: ABG results noted by , planning to unprone her. 1735: Un-proned, and repositioned for comfort. She is restless, nodding her head to 'yes' and 'no' question. I increased the Fentanyl and the Propofol for more sedation. 1800:PCXR completed, lungs are clear bilat. She tolerated it well. Her family has returned to the bedside. 1915: Bedside and Verbal shift change report given to MERCEDEZ Martinez RN (oncoming nurse) by myself (offgoing nurse).  Report included the following information Nurse Handoff Report, Index, ED SBAR, Adult Overview, Intake/Output, MAR, Recent Results, Med Rec Status, Cardiac Rhythm sinus tachycardia., Alarm Parameters, Procedure

## 2023-11-03 NOTE — PROCEDURES
Procedure Note - Central Venous Access:   Performed by CHATO Wellington NP     Diagnosis: Acute hypoxic respiratory failure  Insertion Date: 11/03/23  Time:5:10 AM   Obtained Consent? no; emergent   Procedure Location:  ICU. Immediately prior to the procedure, the patient was reevaluated and found suitable for the planned procedure and any planned medications. Immediately prior to the procedure a time out was called to verify the correct patient, procedure, equipment, staff, and marking as appropriate. Central line Bundle:  Full sterile barrier precautions used. 7-Step Sterility Protocol followed. (cap, mask sterile gown, sterile gloves, large sterile sheet, hand hygiene, 2% chlorhexidine for cutaneous antisepsis)  5 mL 1% Lidocaine placed at insertion site. Patient positioned in Trendelenburg?no   The site was prepped with ChloraPrep and Sterile draping. Catheter inserted into a new site. Using Seldinger technique a Triple Lumen CVC was placed in the Right, Internal Jugular Vein via direct cannulation with 1 number of attempts for Blood Drawing and IV Access. Ultrasound Guidance was utilized. There was good dark, non-pulsatile blood return in all ports. Femoral Site? N/A. If Yes, reason femoral site was chosen: NA  Catheter secured. Biopatch/CHG bio-occlusive dressing in place? yes. The following complications were encountered: None. A follow-up chest x-ray ordered post procedure. The procedure was tolerated well.     CHATO Wellington NP  Critical Care Medicine  [facility]

## 2023-11-03 NOTE — SIGNIFICANT EVENT
OVERNIGHT CRITICAL CARE PROGRESS NOTE      Name: Jonny Ball   : 1950   MRN: [id number]   Date: 11/3/2023      REASON FOR ICU ADMISSION:  Acute hypoxic respiratory failure     OVERNIGHT EVENTS/ASSESSMENT   Patient resting overnight tolerating BiPAP, she acutely took off her BiPAP at 0400, extremely restless/anxious saying that she could not breathe, was unable to tolerate replacing BiPAP due to extreme agitation, patient became acutely hypoxic (50-60s) requiring emergent intubation. (Noted to have brown/bloody secretions in posterior oropharynx) postintubation she continued to have refractory hypoxemia with CXR demonstrating worsening bilateral opacities/pneumonia, severe ARDS with refractory hypoxemia 7.023/54/55/13. 8.  Placed in prone position with improvement in oxygenation    Acute refractory hypoxemic respiratory failure, ARDS in setting of bilateral pneumonia  Continue prone position for 12-16 hrs, with sedation and paralytic to minimize airway pressures  Lung protective ventilation with goal plateau pressure < 30, driving pressure < 15  Diurese as renal function allows to optimize pulmonary pressures in setting of severe ARDS  Systemic Steroids, scheduled bronchodilators      Gram Positive Bacteremia  Bio-fire +Enterococcus Faecalis  Broaden to Meropenem, continue Vancomycin and Azithromycin for atypical's  Repeat Cultures- pending    Attempted to call family x 2 with no answer    Critical Care Time: 100 minutes      [facility] [address] [facility]

## 2023-11-03 NOTE — PROCEDURES
Procedure Note - Intubation:   Performed by CHATO Green NP . Diagnosis: Hypoxic Respiratory Failure  Insertion Date: 11/3/23 Time:0405   Obtained Consent? N/A; emergent   Procedure Location:  ICU. Immediately prior to the procedure, the patient was reevaluated and found suitable for the planned procedure and any planned medications. Immediately prior to the procedure a time out was called to verify the correct patient, procedure, equipment, staff, and marking as appropriate. Preoxygenation applied via BVM  Medications given were etomidate and rocuronium (Zemuron). A number 7.5  ETT was placed to 21 cm at the teeth. Placement was evaluated by noting bilateral, symmetric breath sounds, good end-tidal CO2 detector color change , no breath sounds over stomach, and chest x-ray visualization. Attempts required: 1. Complications: none. The procedure was tolerated well  A follow-up chest x-ray was ordered post procedure.         CHATO Green NP  Critical Care Medicine  [facility]

## 2023-11-03 NOTE — PROGRESS NOTES
SOUND CRITICAL CARE PROGRESS NOTE. Name: Jonny Ball   : 1950   MRN: [id number]   Date: 11/3/2023      Chief Complaint   Patient presents with    Shortness of Breath     Pt arrives as transfer from [facility] on BIPAP. Pt was dxd with CHF exacerbation and bilateral upper lobe pneumonia with positive blood cultures. Per [facility] pt failed hi flow test at [facility]. Pt desatted to 84% on RA on arrival     Reason for ICU admission: Acute worsening respiratory failure    Hospital Course:     24 - 69 y/o female PMHx of CAD, HTN, CKD III, HLD and hypothyroidism presented in [facility] ER with dyspnea on exertion and worsening generalized weakness/fatigue from PCP, with Spo2 79 %. CXR with bilateral apical PNA, BC positive for GPC. She was transferred from [facility] to [facility] for worsening oxygen needs from 4L nasal cannula to BiPAP within 24 hours. Subjective/ Objective:     Overnight events: Admitted in ICU here with BiPAP last evening and overnight got worse and intubated, sedated and proned with initial need of paralysis due to vent dyssynchrony. CXR with worsening BL whitening of lungs, likely ARDS. Abx broadened to Vanc/ merrem and IV solumedrol started. This AM, Patient is intubated, sedated and proned. Paralysis stopped and sedation increased. 2L of crystalloids given and continued at 150 ml per hour with decrease in need of vasopressors. Becoming oligo-uric. Family arrived and bedside and updated about concern of high mortality and possible need or RRT in next 24 hours.      Assessment & Plan     # Severe Acute Respiratory Distress Syndrome, likely secondary to multifocal PNA vs UTI  # Multifocal Pneumonia  # Enterococcal bacteremia    - Blood cultures 23 growing enterococcus   - Resp cultures pending   - Neg flu, covid  - Urine legionella and Pneumococcal antigens pending   - Echocardiogram at [facility] 23 (limited study) but reported as normal left ventricular systolic function with a visually

## 2023-11-03 NOTE — PROGRESS NOTES
Spiritual Care Assessment/Progress Note  Brad    Name: Kylee Mcbride MRN: [id number]    Age: 68 y.o. Sex: female   Language: English     Date: 11/3/2023            Total Time Calculated: 20 min              Spiritual Assessment begun in [facility] 2 CRITICAL CARE  Service Provided For[de-identified] Patient and family together  Referral/Consult From[de-identified] Nurse  Encounter Overview/Reason : Initial Encounter    Spiritual beliefs:      [x] Involved in a shari tradition/spiritual practice: Tate Zee     [] Supported by a shari community:      [] Claims no spiritual orientation:      [] Seeking spiritual identity:           [] Adheres to an individual form of spirituality:      [] Not able to assess:                Identified resources for coping and support system:   Support System: Spouse, Family members       [] Prayer                  [] Devotional reading               [] Music                  [] Guided Imagery     [] Pet visits                                        [] Other: (COMMENT)     Specific area/focus of visit   Encounter:    Crisis: Type: Code (comment)  Spiritual/Emotional needs: Type: Spiritual Support  Ritual, Rites and Sacraments:    Grief, Loss, and Adjustments: Type: Anticipatory Grief  Ethics/Mediation:    Behavioral Health:    Palliative Care: Advance Care Planning:      Plan/Referrals: Continue Support (comment) (family at bedside)    Narrative:   Attended to family ( and son) at bedside of Ms. Rubin Ponce. Spoke with son, who was reaching out to other family members. Shared with both that the patient (wife and mom) can hear them when they speak to her (even if she does not respond.) They were grateful for the  visit and listening presence. Offered to pray, but it didn't seem to be a pressing concern at the moment. (Shared with the family and nurse that a  is available if they should want a visit at another time.)    Shun Barbosa.   [facility] [phone]

## 2023-11-03 NOTE — PROCEDURES
Procedure Note - Arterial Venous Access:   Performed by CHATO Kam NP     Diagnosis: Hypoxic Respiratory Faiure  Insertion Date: 11/03/23  Time:6:30 AM   Obtained Consent? N/A; emergent   Procedure Location:  ICU. Immediately prior to the procedure, the patient was reevaluated and found suitable for the planned procedure and any planned medications. Immediately prior to the procedure a time out was called to verify the correct patient, procedure, equipment, staff, and marking as appropriate. Jonah test to check for collateral flow. Line Bundle:  Full sterile barrier precautions used. 5 mL 1% Lidocaine placed at insertion site. The site was prepped with ChloraPrep and Sterile draping. Catheter inserted into a new site. Using Seldinger technique a arterial catheter was placed in the Left, Radial with 1 number of attempts for hemodynamic monitoring. Ultrasound Guidance was utilized. There was good red pulsatile blood return with waveform on monitor. Femoral Site? N/A. If Yes, reason femoral site was chosen:   Catheter secured. Biopatch/CHG bio-occlusive dressing in place? yes. Complications encountered? no. The procedure was tolerated well.     CHATO Kam NP  Critical Care Medicine  [facility]

## 2023-11-03 NOTE — CARE COORDINATION
Care Management Initial Assessment       RUR: 19%  Readmission? Yes - 11/1/23 and discharged on 11/2/23  1st IM letter given? Yes - given on 11/2/23  1st  letter given: No---N/A         11/03/23 1449   Service Assessment   Patient Orientation Unable to Assess  (Patient is intubated.)   Cognition Other (see comment)  (Patient is intubated.)   History Provided By Child/Family;Significant Other   Primary Caregiver Self   Support Systems Spouse/Significant Other;Children   Patient's Healthcare Decision Maker is: Legal Next of Kin  (Legal nok is the  and he is aware.)   PCP Verified by CM Yes   Last Visit to PCP Within last 3 months  (Family states the patient saw her pcp on 11/1/23)   Prior Functional Level Independent in ADLs/IADLs   Current Functional Level Assistance with the following:;Bathing;Dressing; Toileting;Feeding;Mobility   Can patient return to prior living arrangement Unknown at present   Family able to assist with home care needs: Other (comment)  (Will await to see how patient recovers.)   Would you like for me to discuss the discharge plan with any other family members/significant others, and if so, who?   (Patient is intubated and her next of kin is her .)   Financial Resources Medicare   Community Resources None   Social/Functional History   Lives With Spouse   Type of Home House  (The patient and her spouse live in a two story home and they utilize the first floor.)   Home Equipment None   Active  Yes   Discharge Planning   Type of Residence House   Current Services Prior To Admission None   Patient expects to be discharged to: Other (comment)  (Will need to see how patient does medically.)           Confirmed demographics and pcp information with the patient's  who is at bedside along with his son. Their actual address is [facility] [place], [facility], [address]  The  states the patient was at [facility] overnight and then transferred to [address] [place] [address] .        Readmission Assessment  Number of Days since last admission?: 1-7 days  Previous Disposition: Home with Family  Who is being Interviewed: Caregiver (Patient's .)  What was the patient's/caregiver's perception as to why they think they needed to return back to the hospital?: Other (Comment) (Patient became ill.)  Did you visit your Primary Care Physician after you left the hospital, before you returned this time?: Yes  Did you see a specialist, such as Cardiac, Pulmonary, Orthopedic Physician, etc. after you left the hospital?: No  Who advised the patient to return to the hospital?: Self-referral, Caregiver  Does the patient report anything that got in the way of taking their medications?: No  In our efforts to provide the best possible care to you and others like you, can you think of anything that we could have done to help you after you left the hospital the first time, so that you might not have needed to return so soon?: Other (Comment) (No)       Margie Kenney RN BSN CRM        [phone]

## 2023-11-03 NOTE — INTERDISCIPLINARY ROUNDS
Critical care interdisciplinary rounds today. Following members present: Case Management,  Diabetes Treatment, Nursing, Nutrition, Pharmacy, and Physician. Central line inserted due to pressors needed, Stauffer inserted and patient is currently prone. Monitoring I/O closely. Family at bedside, did not want to participate with rounds today.  was here earlier. Plan of care discussed. See clinical pathway for plan of care and interventions and desired outcomes.

## 2023-11-04 ENCOUNTER — APPOINTMENT (OUTPATIENT)
Facility: HOSPITAL | Age: 73
DRG: 853 | End: 2023-11-04
Payer: MEDICARE

## 2023-11-04 LAB
ACCESSION NUMBER, LLC1M: ABNORMAL
ACINETOBACTER CALCOAC BAUMANNII COMPLEX BY PCR: NOT DETECTED
ALBUMIN SERPL-MCNC: 2.6 G/DL (ref 3.5–5)
ALBUMIN SERPL-MCNC: 2.7 G/DL (ref 3.5–5)
ALBUMIN/GLOB SERPL: 0.9 (ref 1.1–2.2)
ALBUMIN/GLOB SERPL: 1 (ref 1.1–2.2)
ALP SERPL-CCNC: 53 U/L (ref 45–117)
ALP SERPL-CCNC: 56 U/L (ref 45–117)
ALT SERPL-CCNC: 17 U/L (ref 12–78)
ALT SERPL-CCNC: 19 U/L (ref 12–78)
ANION GAP SERPL CALC-SCNC: 10 MMOL/L (ref 5–15)
ANION GAP SERPL CALC-SCNC: 13 MMOL/L (ref 5–15)
ANION GAP SERPL CALC-SCNC: 16 MMOL/L (ref 5–15)
ARTERIAL PATENCY WRIST A: ABNORMAL
AST SERPL-CCNC: 31 U/L (ref 15–37)
AST SERPL-CCNC: 34 U/L (ref 15–37)
B FRAGILIS DNA BLD POS QL NAA+NON-PROBE: NOT DETECTED
BACTERIA SPEC CULT: ABNORMAL
BASE DEFICIT BLD-SCNC: 7.2 MMOL/L
BASE DEFICIT BLDA-SCNC: 6.6 MMOL/L
BASE DEFICIT BLDA-SCNC: 6.8 MMOL/L
BASE DEFICIT BLDA-SCNC: 9.6 MMOL/L
BASOPHILS # BLD: 0 K/UL (ref 0–0.1)
BASOPHILS NFR BLD: 0 % (ref 0–1)
BDY SITE: ABNORMAL
BILIRUB DIRECT SERPL-MCNC: 0.5 MG/DL (ref 0–0.2)
BILIRUB DIRECT SERPL-MCNC: 0.6 MG/DL (ref 0–0.2)
BILIRUB SERPL-MCNC: 0.8 MG/DL (ref 0.2–1)
BILIRUB SERPL-MCNC: 0.8 MG/DL (ref 0.2–1)
BIOFIRE TEST COMMENT: ABNORMAL
BUN SERPL-MCNC: 38 MG/DL (ref 6–20)
BUN SERPL-MCNC: 41 MG/DL (ref 6–20)
BUN SERPL-MCNC: 46 MG/DL (ref 6–20)
BUN SERPL-MCNC: 46 MG/DL (ref 6–20)
BUN SERPL-MCNC: 48 MG/DL (ref 6–20)
BUN SERPL-MCNC: 51 MG/DL (ref 6–20)
BUN/CREAT SERPL: 18 (ref 12–20)
BUN/CREAT SERPL: 19 (ref 12–20)
BUN/CREAT SERPL: 20 (ref 12–20)
BUN/CREAT SERPL: 21 (ref 12–20)
C ALBICANS DNA BLD POS QL NAA+NON-PROBE: NOT DETECTED
C AURIS DNA BLD POS QL NAA+NON-PROBE: NOT DETECTED
C GATTII+NEOFOR DNA BLD POS QL NAA+N-PRB: NOT DETECTED
C GLABRATA DNA BLD POS QL NAA+NON-PROBE: NOT DETECTED
C KRUSEI DNA BLD POS QL NAA+NON-PROBE: NOT DETECTED
C PARAP DNA BLD POS QL NAA+NON-PROBE: NOT DETECTED
C TROPICLS DNA BLD POS QL NAA+NON-PROBE: NOT DETECTED
CALCIUM SERPL-MCNC: 7.3 MG/DL (ref 8.5–10.1)
CALCIUM SERPL-MCNC: 7.6 MG/DL (ref 8.5–10.1)
CALCIUM SERPL-MCNC: 7.8 MG/DL (ref 8.5–10.1)
CALCIUM SERPL-MCNC: 7.8 MG/DL (ref 8.5–10.1)
CHLORIDE SERPL-SCNC: 104 MMOL/L (ref 97–108)
CHLORIDE SERPL-SCNC: 104 MMOL/L (ref 97–108)
CHLORIDE SERPL-SCNC: 106 MMOL/L (ref 97–108)
CO2 SERPL-SCNC: 16 MMOL/L (ref 21–32)
CO2 SERPL-SCNC: 18 MMOL/L (ref 21–32)
CO2 SERPL-SCNC: 20 MMOL/L (ref 21–32)
CO2 SERPL-SCNC: 21 MMOL/L (ref 21–32)
COMMENT:: NORMAL
COMMENT:: NORMAL
CREAT SERPL-MCNC: 2.06 MG/DL (ref 0.55–1.02)
CREAT SERPL-MCNC: 2.26 MG/DL (ref 0.55–1.02)
CREAT SERPL-MCNC: 2.45 MG/DL (ref 0.55–1.02)
CREAT SERPL-MCNC: 2.47 MG/DL (ref 0.55–1.02)
CREAT SERPL-MCNC: 2.47 MG/DL (ref 0.55–1.02)
CREAT SERPL-MCNC: 2.51 MG/DL (ref 0.55–1.02)
DIFFERENTIAL METHOD BLD: ABNORMAL
E CLOAC COMP DNA BLD POS NAA+NON-PROBE: NOT DETECTED
E COLI DNA BLD POS QL NAA+NON-PROBE: NOT DETECTED
E FAECALIS DNA BLD POS QL NAA+NON-PROBE: DETECTED
E FAECIUM DNA BLD POS QL NAA+NON-PROBE: NOT DETECTED
ENTEROBACTERALES DNA BLD POS NAA+N-PRB: NOT DETECTED
EOSINOPHIL # BLD: 0 K/UL (ref 0–0.4)
EOSINOPHIL NFR BLD: 0 % (ref 0–7)
ERYTHROCYTE [DISTWIDTH] IN BLOOD BY AUTOMATED COUNT: 15.3 % (ref 11.5–14.5)
ERYTHROCYTE [DISTWIDTH] IN BLOOD BY AUTOMATED COUNT: 15.6 % (ref 11.5–14.5)
ERYTHROCYTE [DISTWIDTH] IN BLOOD BY AUTOMATED COUNT: 15.7 % (ref 11.5–14.5)
FIO2 ON VENT: 100 %
FIO2 ON VENT: 60 %
GAS FLOW.O2 O2 DELIVERY SYS: ABNORMAL
GAS FLOW.O2 SETTING OXYMISER: 30
GAS FLOW.O2 SETTING OXYMISER: 34
GAS FLOW.O2 SETTING OXYMISER: 34
GAS FLOW.O2 SETTING OXYMISER: 34 BPM
GLOBULIN SER CALC-MCNC: 2.8 G/DL (ref 2–4)
GLOBULIN SER CALC-MCNC: 2.8 G/DL (ref 2–4)
GLUCOSE BLD STRIP.AUTO-MCNC: 136 MG/DL (ref 65–117)
GLUCOSE BLD STRIP.AUTO-MCNC: 144 MG/DL (ref 65–117)
GLUCOSE BLD STRIP.AUTO-MCNC: 172 MG/DL (ref 65–117)
GLUCOSE BLD STRIP.AUTO-MCNC: 174 MG/DL (ref 65–117)
GLUCOSE BLD STRIP.AUTO-MCNC: 176 MG/DL (ref 65–117)
GLUCOSE BLD STRIP.AUTO-MCNC: 194 MG/DL (ref 65–117)
GLUCOSE SERPL-MCNC: 137 MG/DL (ref 65–100)
GLUCOSE SERPL-MCNC: 147 MG/DL (ref 65–100)
GLUCOSE SERPL-MCNC: 151 MG/DL (ref 65–100)
GLUCOSE SERPL-MCNC: 171 MG/DL (ref 65–100)
GLUCOSE SERPL-MCNC: 177 MG/DL (ref 65–100)
GLUCOSE SERPL-MCNC: 182 MG/DL (ref 65–100)
GP B STREP DNA BLD POS QL NAA+NON-PROBE: NOT DETECTED
HAEM INFLU DNA BLD POS QL NAA+NON-PROBE: NOT DETECTED
HCO3 BLD-SCNC: 18.7 MMOL/L (ref 22–26)
HCO3 BLDA-SCNC: 15 MMOL/L (ref 22–26)
HCO3 BLDA-SCNC: 17 MMOL/L (ref 22–26)
HCO3 BLDA-SCNC: 18 MMOL/L (ref 22–26)
HCT VFR BLD AUTO: 16.5 % (ref 35–47)
HCT VFR BLD AUTO: 16.5 % (ref 35–47)
HCT VFR BLD AUTO: 19.1 % (ref 35–47)
HCT VFR BLD AUTO: 24.3 % (ref 35–47)
HGB BLD-MCNC: 5.2 G/DL (ref 11.5–16)
HGB BLD-MCNC: 5.3 G/DL (ref 11.5–16)
HGB BLD-MCNC: 6 G/DL (ref 11.5–16)
HGB BLD-MCNC: 8.1 G/DL (ref 11.5–16)
HISTORY CHECK: NORMAL
HISTORY CHECK: NORMAL
IMM GRANULOCYTES # BLD AUTO: 0.2 K/UL (ref 0–0.04)
IMM GRANULOCYTES NFR BLD AUTO: 2 % (ref 0–0.5)
K OXYTOCA DNA BLD POS QL NAA+NON-PROBE: NOT DETECTED
KLEBSIELLA SP DNA BLD POS QL NAA+NON-PRB: NOT DETECTED
KLEBSIELLA SP DNA BLD POS QL NAA+NON-PRB: NOT DETECTED
L MONOCYTOG DNA BLD POS QL NAA+NON-PROBE: NOT DETECTED
LACTATE SERPL-SCNC: 3.2 MMOL/L (ref 0.4–2)
LACTATE SERPL-SCNC: 8.7 MMOL/L (ref 0.4–2)
LYMPHOCYTES # BLD: 0.6 K/UL (ref 0.8–3.5)
LYMPHOCYTES NFR BLD: 6 % (ref 12–49)
MAGNESIUM SERPL-MCNC: 2.4 MG/DL (ref 1.6–2.4)
MCH RBC QN AUTO: 28.4 PG (ref 26–34)
MCH RBC QN AUTO: 29 PG (ref 26–34)
MCH RBC QN AUTO: 29.4 PG (ref 26–34)
MCHC RBC AUTO-ENTMCNC: 31.4 G/DL (ref 30–36.5)
MCHC RBC AUTO-ENTMCNC: 31.5 G/DL (ref 30–36.5)
MCHC RBC AUTO-ENTMCNC: 32.1 G/DL (ref 30–36.5)
MCV RBC AUTO: 90.2 FL (ref 80–99)
MCV RBC AUTO: 90.2 FL (ref 80–99)
MCV RBC AUTO: 93.6 FL (ref 80–99)
MONOCYTES # BLD: 0.5 K/UL (ref 0–1)
MONOCYTES NFR BLD: 5 % (ref 5–13)
N MEN DNA BLD POS QL NAA+NON-PROBE: NOT DETECTED
NEUTS SEG # BLD: 8.8 K/UL (ref 1.8–8)
NEUTS SEG NFR BLD: 87 % (ref 32–75)
NRBC # BLD: 0 K/UL (ref 0–0.01)
NRBC # BLD: 0 K/UL (ref 0–0.01)
NRBC # BLD: 0.02 K/UL (ref 0–0.01)
NRBC BLD-RTO: 0 PER 100 WBC
NRBC BLD-RTO: 0 PER 100 WBC
NRBC BLD-RTO: 0.2 PER 100 WBC
O2/TOTAL GAS SETTING VFR VENT: 90 %
P AERUGINOSA DNA BLD POS NAA+NON-PROBE: NOT DETECTED
PCO2 BLD: 38.1 MMHG (ref 35–45)
PCO2 BLDA: 31 MMHG (ref 35–45)
PCO2 BLDA: 32 MMHG (ref 35–45)
PCO2 BLDA: 35 MMHG (ref 35–45)
PEEP RESPIRATORY: 15
PEEP RESPIRATORY: 15 CMH2O
PH BLD: 7.3 (ref 7.35–7.45)
PH BLDA: 7.3 (ref 7.35–7.45)
PH BLDA: 7.34 (ref 7.35–7.45)
PH BLDA: 7.37 (ref 7.35–7.45)
PHOSPHATE SERPL-MCNC: 5.1 MG/DL (ref 2.6–4.7)
PLATELET # BLD AUTO: 135 K/UL (ref 150–400)
PLATELET # BLD AUTO: 136 K/UL (ref 150–400)
PLATELET # BLD AUTO: 148 K/UL (ref 150–400)
PMV BLD AUTO: 10.3 FL (ref 8.9–12.9)
PMV BLD AUTO: 10.4 FL (ref 8.9–12.9)
PMV BLD AUTO: 10.4 FL (ref 8.9–12.9)
PO2 BLD: 89 MMHG (ref 80–100)
PO2 BLDA: 64 MMHG (ref 80–100)
PO2 BLDA: 70 MMHG (ref 80–100)
PO2 BLDA: 93 MMHG (ref 80–100)
POTASSIUM SERPL-SCNC: 4.5 MMOL/L (ref 3.5–5.1)
POTASSIUM SERPL-SCNC: 4.7 MMOL/L (ref 3.5–5.1)
POTASSIUM SERPL-SCNC: 4.8 MMOL/L (ref 3.5–5.1)
POTASSIUM SERPL-SCNC: 4.8 MMOL/L (ref 3.5–5.1)
POTASSIUM SERPL-SCNC: 4.9 MMOL/L (ref 3.5–5.1)
POTASSIUM SERPL-SCNC: 5.1 MMOL/L (ref 3.5–5.1)
PROT SERPL-MCNC: 5.4 G/DL (ref 6.4–8.2)
PROT SERPL-MCNC: 5.5 G/DL (ref 6.4–8.2)
PROTEUS SP DNA BLD POS QL NAA+NON-PROBE: NOT DETECTED
RBC # BLD AUTO: 1.83 M/UL (ref 3.8–5.2)
RBC # BLD AUTO: 1.83 M/UL (ref 3.8–5.2)
RBC # BLD AUTO: 2.04 M/UL (ref 3.8–5.2)
RBC MORPH BLD: ABNORMAL
RESISTANT GENE TARGETS: ABNORMAL
S AUREUS DNA BLD POS QL NAA+NON-PROBE: NOT DETECTED
S AUREUS+CONS DNA BLD POS NAA+NON-PROBE: NOT DETECTED
S EPIDERMIDIS DNA BLD POS QL NAA+NON-PRB: NOT DETECTED
S LUGDUNENSIS DNA BLD POS QL NAA+NON-PRB: NOT DETECTED
S MALTOPHILIA DNA BLD POS QL NAA+NON-PRB: NOT DETECTED
S MARCESCENS DNA BLD POS NAA+NON-PROBE: NOT DETECTED
S PNEUM DNA BLD POS QL NAA+NON-PROBE: NOT DETECTED
S PYO DNA BLD POS QL NAA+NON-PROBE: NOT DETECTED
SALMONELLA DNA BLD POS QL NAA+NON-PROBE: NOT DETECTED
SAO2 % BLD: 91 % (ref 92–97)
SAO2 % BLD: 94 % (ref 92–97)
SAO2 % BLD: 95.9 % (ref 92–97)
SAO2 % BLD: 97 % (ref 92–97)
SAO2% DEVICE SAO2% SENSOR NAME: ABNORMAL
SERVICE CMNT-IMP: ABNORMAL
SODIUM SERPL-SCNC: 135 MMOL/L (ref 136–145)
SODIUM SERPL-SCNC: 136 MMOL/L (ref 136–145)
SODIUM SERPL-SCNC: 136 MMOL/L (ref 136–145)
SODIUM SERPL-SCNC: 137 MMOL/L (ref 136–145)
SPECIMEN HOLD: NORMAL
SPECIMEN HOLD: NORMAL
SPECIMEN SITE: ABNORMAL
SPECIMEN TYPE: ABNORMAL
STREPTOCOCCUS DNA BLD POS NAA+NON-PROBE: NOT DETECTED
VANA+VANB BLD POS QL NAA+NON-PROBE: NOT DETECTED
VANCOMYCIN SERPL-MCNC: 24.7 UG/ML
VENTILATION MODE VENT: ABNORMAL
VT SETTING VENT: 275 ML
VT SETTING VENT: 30
VT SETTING VENT: 300
VT SETTING VENT: 360
WBC # BLD AUTO: 10.1 K/UL (ref 3.6–11)
WBC # BLD AUTO: 8.2 K/UL (ref 3.6–11)
WBC # BLD AUTO: 8.4 K/UL (ref 3.6–11)
WBC MORPH BLD: ABNORMAL

## 2023-11-04 PROCEDURE — 2000000000 HC ICU R&B

## 2023-11-04 PROCEDURE — 37799 UNLISTED PX VASCULAR SURGERY: CPT

## 2023-11-04 PROCEDURE — 85018 HEMOGLOBIN: CPT

## 2023-11-04 PROCEDURE — 80076 HEPATIC FUNCTION PANEL: CPT

## 2023-11-04 PROCEDURE — 86923 COMPATIBILITY TEST ELECTRIC: CPT

## 2023-11-04 PROCEDURE — 94640 AIRWAY INHALATION TREATMENT: CPT

## 2023-11-04 PROCEDURE — 86900 BLOOD TYPING SEROLOGIC ABO: CPT

## 2023-11-04 PROCEDURE — 2580000003 HC RX 258: Performed by: HOSPITALIST

## 2023-11-04 PROCEDURE — 71045 X-RAY EXAM CHEST 1 VIEW: CPT

## 2023-11-04 PROCEDURE — 6370000000 HC RX 637 (ALT 250 FOR IP): Performed by: CLINICAL NURSE SPECIALIST

## 2023-11-04 PROCEDURE — 85027 COMPLETE CBC AUTOMATED: CPT

## 2023-11-04 PROCEDURE — 85014 HEMATOCRIT: CPT

## 2023-11-04 PROCEDURE — 6370000000 HC RX 637 (ALT 250 FOR IP): Performed by: STUDENT IN AN ORGANIZED HEALTH CARE EDUCATION/TRAINING PROGRAM

## 2023-11-04 PROCEDURE — 82962 GLUCOSE BLOOD TEST: CPT

## 2023-11-04 PROCEDURE — 85025 COMPLETE CBC W/AUTO DIFF WBC: CPT

## 2023-11-04 PROCEDURE — 2700000000 HC OXYGEN THERAPY PER DAY

## 2023-11-04 PROCEDURE — 83735 ASSAY OF MAGNESIUM: CPT

## 2023-11-04 PROCEDURE — C9113 INJ PANTOPRAZOLE SODIUM, VIA: HCPCS | Performed by: HOSPITALIST

## 2023-11-04 PROCEDURE — 36430 TRANSFUSION BLD/BLD COMPNT: CPT

## 2023-11-04 PROCEDURE — A4216 STERILE WATER/SALINE, 10 ML: HCPCS | Performed by: HOSPITALIST

## 2023-11-04 PROCEDURE — P9016 RBC LEUKOCYTES REDUCED: HCPCS

## 2023-11-04 PROCEDURE — 86850 RBC ANTIBODY SCREEN: CPT

## 2023-11-04 PROCEDURE — 84100 ASSAY OF PHOSPHORUS: CPT

## 2023-11-04 PROCEDURE — 86901 BLOOD TYPING SEROLOGIC RH(D): CPT

## 2023-11-04 PROCEDURE — 36415 COLL VENOUS BLD VENIPUNCTURE: CPT

## 2023-11-04 PROCEDURE — 83605 ASSAY OF LACTIC ACID: CPT

## 2023-11-04 PROCEDURE — 6360000002 HC RX W HCPCS: Performed by: NURSE PRACTITIONER

## 2023-11-04 PROCEDURE — 2500000003 HC RX 250 WO HCPCS: Performed by: HOSPITALIST

## 2023-11-04 PROCEDURE — 80048 BASIC METABOLIC PNL TOTAL CA: CPT

## 2023-11-04 PROCEDURE — 82803 BLOOD GASES ANY COMBINATION: CPT

## 2023-11-04 PROCEDURE — 6360000002 HC RX W HCPCS: Performed by: HOSPITALIST

## 2023-11-04 PROCEDURE — 6370000000 HC RX 637 (ALT 250 FOR IP): Performed by: HOSPITALIST

## 2023-11-04 PROCEDURE — 94003 VENT MGMT INPAT SUBQ DAY: CPT

## 2023-11-04 PROCEDURE — 80202 ASSAY OF VANCOMYCIN: CPT

## 2023-11-04 RX ORDER — SODIUM CHLORIDE, SODIUM LACTATE, POTASSIUM CHLORIDE, AND CALCIUM CHLORIDE .6; .31; .03; .02 G/100ML; G/100ML; G/100ML; G/100ML
1000 INJECTION, SOLUTION INTRAVENOUS ONCE
Status: COMPLETED | OUTPATIENT
Start: 2023-11-04 | End: 2023-11-04

## 2023-11-04 RX ORDER — SODIUM CHLORIDE 9 MG/ML
INJECTION, SOLUTION INTRAVENOUS PRN
Status: DISCONTINUED | OUTPATIENT
Start: 2023-11-04 | End: 2023-11-04

## 2023-11-04 RX ORDER — FENTANYL CITRATE-0.9 % NACL/PF 20 MCG/2ML
100 SYRINGE (ML) INTRAVENOUS ONCE
Status: COMPLETED | OUTPATIENT
Start: 2023-11-04 | End: 2023-11-04

## 2023-11-04 RX ORDER — NOREPINEPHRINE BITARTRATE 0.06 MG/ML
1-100 INJECTION, SOLUTION INTRAVENOUS CONTINUOUS
Status: DISCONTINUED | OUTPATIENT
Start: 2023-11-04 | End: 2023-11-06

## 2023-11-04 RX ADMIN — IPRATROPIUM BROMIDE AND ALBUTEROL SULFATE 1 DOSE: 2.5; .5 SOLUTION RESPIRATORY (INHALATION) at 16:33

## 2023-11-04 RX ADMIN — AZITHROMYCIN MONOHYDRATE 500 MG: 500 INJECTION, POWDER, LYOPHILIZED, FOR SOLUTION INTRAVENOUS at 18:42

## 2023-11-04 RX ADMIN — CHLORHEXIDINE GLUCONATE 15 ML: 1.2 RINSE ORAL at 20:15

## 2023-11-04 RX ADMIN — IPRATROPIUM BROMIDE AND ALBUTEROL SULFATE 1 DOSE: 2.5; .5 SOLUTION RESPIRATORY (INHALATION) at 11:16

## 2023-11-04 RX ADMIN — PROPOFOL 50 MCG/KG/MIN: 10 INJECTION, EMULSION INTRAVENOUS at 22:56

## 2023-11-04 RX ADMIN — SODIUM CHLORIDE, POTASSIUM CHLORIDE, SODIUM LACTATE AND CALCIUM CHLORIDE 1000 ML: 600; 310; 30; 20 INJECTION, SOLUTION INTRAVENOUS at 10:42

## 2023-11-04 RX ADMIN — Medication 200 MCG/HR: at 19:52

## 2023-11-04 RX ADMIN — Medication 1 AMPULE: at 08:25

## 2023-11-04 RX ADMIN — Medication 100 MCG/HR: at 10:10

## 2023-11-04 RX ADMIN — Medication 100 MCG: at 10:06

## 2023-11-04 RX ADMIN — Medication 50 MCG/HR: at 10:09

## 2023-11-04 RX ADMIN — INSULIN HUMAN 7 UNITS: 100 INJECTION, SUSPENSION SUBCUTANEOUS at 01:54

## 2023-11-04 RX ADMIN — SODIUM CHLORIDE, POTASSIUM CHLORIDE, SODIUM LACTATE AND CALCIUM CHLORIDE: 600; 310; 30; 20 INJECTION, SOLUTION INTRAVENOUS at 00:22

## 2023-11-04 RX ADMIN — CEFTRIAXONE SODIUM 2000 MG: 2 INJECTION, POWDER, FOR SOLUTION INTRAMUSCULAR; INTRAVENOUS at 11:57

## 2023-11-04 RX ADMIN — SODIUM CHLORIDE, POTASSIUM CHLORIDE, SODIUM LACTATE AND CALCIUM CHLORIDE: 600; 310; 30; 20 INJECTION, SOLUTION INTRAVENOUS at 07:02

## 2023-11-04 RX ADMIN — METHYLPREDNISOLONE SODIUM SUCCINATE 40 MG: 40 INJECTION, POWDER, FOR SOLUTION INTRAMUSCULAR; INTRAVENOUS at 06:05

## 2023-11-04 RX ADMIN — Medication 1 AMPULE: at 21:44

## 2023-11-04 RX ADMIN — PROPOFOL 30 MCG/KG/MIN: 10 INJECTION, EMULSION INTRAVENOUS at 09:49

## 2023-11-04 RX ADMIN — SODIUM CHLORIDE, PRESERVATIVE FREE 10 ML: 5 INJECTION INTRAVENOUS at 08:35

## 2023-11-04 RX ADMIN — NOREPINEPHRINE BITARTRATE 4 MCG/MIN: 1 INJECTION, SOLUTION, CONCENTRATE INTRAVENOUS at 10:06

## 2023-11-04 RX ADMIN — IPRATROPIUM BROMIDE AND ALBUTEROL SULFATE 1 DOSE: 2.5; .5 SOLUTION RESPIRATORY (INHALATION) at 07:54

## 2023-11-04 RX ADMIN — SODIUM CHLORIDE 40 MG: 9 INJECTION INTRAMUSCULAR; INTRAVENOUS; SUBCUTANEOUS at 08:25

## 2023-11-04 RX ADMIN — Medication 100 MCG: at 10:19

## 2023-11-04 RX ADMIN — METHYLPREDNISOLONE SODIUM SUCCINATE 40 MG: 40 INJECTION, POWDER, FOR SOLUTION INTRAMUSCULAR; INTRAVENOUS at 18:40

## 2023-11-04 RX ADMIN — IPRATROPIUM BROMIDE AND ALBUTEROL SULFATE 1 DOSE: 2.5; .5 SOLUTION RESPIRATORY (INHALATION) at 20:37

## 2023-11-04 RX ADMIN — INSULIN HUMAN 7 UNITS: 100 INJECTION, SUSPENSION SUBCUTANEOUS at 15:06

## 2023-11-04 RX ADMIN — PROPOFOL 30 MCG/KG/MIN: 10 INJECTION, EMULSION INTRAVENOUS at 01:53

## 2023-11-04 RX ADMIN — SODIUM CHLORIDE, PRESERVATIVE FREE 10 ML: 5 INJECTION INTRAVENOUS at 20:15

## 2023-11-04 RX ADMIN — CHLORHEXIDINE GLUCONATE 15 ML: 1.2 RINSE ORAL at 08:35

## 2023-11-04 RX ADMIN — ENOXAPARIN SODIUM 30 MG: 100 INJECTION SUBCUTANEOUS at 08:25

## 2023-11-04 RX ADMIN — PROPOFOL 50 MCG/KG/MIN: 10 INJECTION, EMULSION INTRAVENOUS at 17:55

## 2023-11-04 RX ADMIN — NOREPINEPHRINE BITARTRATE 6 MCG/MIN: 1 INJECTION, SOLUTION, CONCENTRATE INTRAVENOUS at 17:43

## 2023-11-04 RX ADMIN — PROPOFOL 50 MCG/KG/MIN: 10 INJECTION, EMULSION INTRAVENOUS at 14:27

## 2023-11-04 RX ADMIN — Medication: at 20:13

## 2023-11-04 RX ADMIN — IPRATROPIUM BROMIDE AND ALBUTEROL SULFATE 1 DOSE: 2.5; .5 SOLUTION RESPIRATORY (INHALATION) at 03:00

## 2023-11-04 RX ADMIN — Medication: at 08:35

## 2023-11-04 RX ADMIN — Medication 200 MCG/HR: at 14:50

## 2023-11-04 ASSESSMENT — PULMONARY FUNCTION TESTS
PIF_VALUE: 34
PIF_VALUE: 31
PIF_VALUE: 29
PIF_VALUE: 29
PIF_VALUE: 28
PIF_VALUE: 29

## 2023-11-04 ASSESSMENT — PAIN SCALES - GENERAL
PAINLEVEL_OUTOF10: 0

## 2023-11-04 NOTE — PROGRESS NOTES
0730: Bedside and Verbal shift change report given to Jeevan Rudd by Cristino Gannon RN. Report included the following information Nurse Handoff Report, ED Encounter Summary, ED SBAR, Adult Overview, Intake/Output, MAR, Recent Results, and Cardiac Rhythm ST .   0800: Shift assessment complete. 8837-0514: Patient increasingly restless, suctioned twice to remove secretions. Patient showing no signs of improvement, feels like she is choking. Dr. Jessica Mace at bedside, patient remains restless at this time. Verbal orders to increase propofol per titration requirements to keep patient comfortable and decrease work of breathing. 1006: Verbal orders for fentanyl bolus 100 mcg at this time, following bolus increase fentanyl to 100 mcg. 1019: Verbal orders for second fentanyl bolus 100 mcg and to continue to increase fentanyl per titration requirement to keep patient comfortable. 1200: Reassessment complete, patient resting comfortably at this time. Patient is afebrile and sedated. Fentanyl is running at 200 mcg/hour, Levophed is running at 6 mcg/min, propofol is at 50 mcg/kg/min. 1400: Patient resting comfortably at this time. 1600: Reassessment complete, no acute changes to note. 1900: Shift report given to LEANN Up.

## 2023-11-04 NOTE — CONSENT
Informed Consent for Blood Component Transfusion Note    I have discussed with the  the rationale for blood component transfusion; its benefits in treating or preventing fatigue, organ damage, or death; and its risk which includes mild transfusion reactions, rare risk of blood borne infection, or more serious but rare reactions. I have discussed the alternatives to transfusion, including the risk and consequences of not receiving transfusion. The  had an opportunity to ask questions and had agreed to proceed with transfusion of blood components.     Electronically signed by CHATO Styles NP on 11/4/23 at 5:50 AM EDT

## 2023-11-04 NOTE — PROGRESS NOTES
SOUND CRITICAL CARE PROGRESS NOTE. Name: [place]   : 1950   MRN: [id number]   Date: 2023      Chief Complaint   Patient presents with    Shortness of Breath     Pt arrives as transfer from [facility] on BIPAP. Pt was dxd with CHF exacerbation and bilateral upper lobe pneumonia with positive blood cultures. Per [facility] pt failed hi flow test at [facility]. Pt desatted to 84% on RA on arrival     Reason for ICU admission: Acute worsening respiratory failure    Hospital Course:     24 - 69 y/o female PMHx of CAD, HTN, CKD III, HLD and hypothyroidism presented in [facility] ER with dyspnea on exertion and worsening generalized weakness/fatigue from PCP, with Spo2 79 %. CXR with bilateral apical PNA, BC positive for enterococcus. She was transferred from [facility] to [facility] for worsening oxygen needs from 4L nasal cannula to BiPAP within 24 hours. Transferred to ICU on BiPAP 23 night. 11/3/23 - intubated emergently around olivia for worsening hypoxmemia. PF/< 100, ARDS protocol initiated, proned. Hypotensive on Levo, LR boluses x 2 followed by LR infusion. Subjective/ Objective: This AM, Patient is intubated, sedated and proned. Later in AM, went up on sedation up to 200 fentanyl drip and Propofol at 50. Additional 1L LR bolus given for hypotension. Urine out put dropping and almost anuric. CR going up. Lactate high in AM but trending down. Assessment & Plan     # Severe Acute Respiratory Distress Syndrome, likely secondary to multifocal PNA vs UTI  # Multifocal Pneumonia  # Enterococcal bacteremia    - Blood cultures 23 growing enterococcus   - Resp cultures pending   - Neg flu, covid and resp viral panel are negative   - Urine legionella and Pneumococcal antigens pending   - Echocardiogram at [facility] 23 (limited study) but reported as normal left ventricular systolic function with a visually estimated EF of 60 - 65%. Left ventricle size is normal. Mildly increased wall thickness.  Normal Thyroid disease. Past Surgical History:      has a past surgical history that includes gyn; Hysterectomy; Ovary removal; Appendectomy (7981); cervical laminectomy (1989, 2005); Cardiac catheterization (2011); orthopedic surgery (2013, 2014); orthopedic surgery (1987); and orthopedic surgery (1984). Home Medications:     Prior to Admission medications    Medication Sig Start Date End Date Taking? Authorizing Provider   HYDROmorphone (DILAUDID) 2 MG tablet Take 1 tablet by mouth every 8 hours as needed for Pain Severe (7-10). 10/2/23   Modesta Montes De Oca MD   SYNTHROID 25 MCG tablet Take 1 tablet by mouth every morning (before breakfast) 10/13/23   Modesta Montes De Oca MD   Semaglutide,0.25 or 0.5MG/DOS, (OZEMPIC, 0.25 OR 0.5 MG/DOSE,) 2 MG/1.5ML SOPN Inject into the skin every 7 days    Modesta Montes De Oca MD   ondansetron (ZOFRAN-ODT) 4 MG disintegrating tablet Place 1 tablet under the tongue every 8 hours as needed for Nausea or Vomiting 10/2/23   Katy Ferris DO   amLODIPine (NORVASC) 2.5 MG tablet TAKE 1 TABLET TWICE A DAY 8/17/23   [facility] Jenna, APRN - NP   albuterol (PROVENTIL) (2.5 MG/3ML) 0.083% nebulizer solution Inhale 3 mLs into the lungs every 4 hours as needed for Shortness of Breath    Automatic Reconciliation, Ar   aspirin 81 MG EC tablet Take 1 tablet by mouth daily 11/9/18   Automatic Reconciliation, Ar   Budeson-Glycopyrrol-Formoterol (BREZTRI AEROSPHERE) 160-9-4.8 MCG/ACT AERO Inhale 2 puffs into the lungs 2 times daily 6/21/22   Automatic Reconciliation, Ar   cyclobenzaprine (FLEXERIL) 10 MG tablet Take 1 tablet by mouth 3 times daily as needed    Automatic Reconciliation, Ar   diclofenac sodium (VOLTAREN) 1 % GEL as needed    Automatic Reconciliation, Ar   diphenhydrAMINE (BENADRYL) 25 MG capsule Take 1 capsule by mouth every 6 hours as needed    Automatic Reconciliation, Ar   estrogens conjugated (PREMARIN) 0.625 MG/GM CREA vaginal cream every Tuesday and Friday.

## 2023-11-05 ENCOUNTER — APPOINTMENT (OUTPATIENT)
Facility: HOSPITAL | Age: 73
DRG: 853 | End: 2023-11-05
Payer: MEDICARE

## 2023-11-05 LAB
ALBUMIN SERPL-MCNC: 2.9 G/DL (ref 3.5–5)
ALBUMIN/GLOB SERPL: 0.9 (ref 1.1–2.2)
ALP SERPL-CCNC: 60 U/L (ref 45–117)
ALT SERPL-CCNC: 99 U/L (ref 12–78)
ANION GAP BLD CALC-SCNC: 3 (ref 10–20)
ANION GAP SERPL CALC-SCNC: 7 MMOL/L (ref 5–15)
ANION GAP SERPL CALC-SCNC: 8 MMOL/L (ref 5–15)
ANION GAP SERPL CALC-SCNC: 9 MMOL/L (ref 5–15)
ANION GAP SERPL CALC-SCNC: 9 MMOL/L (ref 5–15)
ARTERIAL PATENCY WRIST A: ABNORMAL
AST SERPL-CCNC: 110 U/L (ref 15–37)
BACTERIA SPEC CULT: NORMAL
BASE DEFICIT BLD-SCNC: 6.1 MMOL/L
BASE DEFICIT BLDA-SCNC: 8 MMOL/L
BASE DEFICIT BLDA-SCNC: 8.3 MMOL/L
BASE DEFICIT BLDA-SCNC: 8.4 MMOL/L
BASE DEFICIT BLDA-SCNC: 8.9 MMOL/L
BASE DEFICIT BLDV-SCNC: 6.5 MMOL/L
BASOPHILS # BLD: 0 K/UL (ref 0–0.1)
BASOPHILS NFR BLD: 0 % (ref 0–1)
BDY SITE: ABNORMAL
BILIRUB DIRECT SERPL-MCNC: 0.6 MG/DL (ref 0–0.2)
BILIRUB SERPL-MCNC: 0.9 MG/DL (ref 0.2–1)
BUN SERPL-MCNC: 57 MG/DL (ref 6–20)
BUN SERPL-MCNC: 58 MG/DL (ref 6–20)
BUN SERPL-MCNC: 59 MG/DL (ref 6–20)
BUN SERPL-MCNC: 60 MG/DL (ref 6–20)
BUN/CREAT SERPL: 23 (ref 12–20)
BUN/CREAT SERPL: 23 (ref 12–20)
BUN/CREAT SERPL: 24 (ref 12–20)
BUN/CREAT SERPL: 24 (ref 12–20)
CA-I BLD-MCNC: 1.06 MMOL/L (ref 1.12–1.32)
CALCIUM SERPL-MCNC: 7.5 MG/DL (ref 8.5–10.1)
CALCIUM SERPL-MCNC: 7.5 MG/DL (ref 8.5–10.1)
CALCIUM SERPL-MCNC: 7.6 MG/DL (ref 8.5–10.1)
CALCIUM SERPL-MCNC: 7.6 MG/DL (ref 8.5–10.1)
CHLORIDE BLD-SCNC: 112 MMOL/L (ref 100–108)
CHLORIDE SERPL-SCNC: 106 MMOL/L (ref 97–108)
CHLORIDE SERPL-SCNC: 107 MMOL/L (ref 97–108)
CHLORIDE SERPL-SCNC: 107 MMOL/L (ref 97–108)
CHLORIDE SERPL-SCNC: 109 MMOL/L (ref 97–108)
CO2 BLD-SCNC: 20 MMOL/L (ref 19–24)
CO2 SERPL-SCNC: 21 MMOL/L (ref 21–32)
CO2 SERPL-SCNC: 22 MMOL/L (ref 21–32)
CREAT SERPL-MCNC: 2.34 MG/DL (ref 0.55–1.02)
CREAT SERPL-MCNC: 2.48 MG/DL (ref 0.55–1.02)
CREAT SERPL-MCNC: 2.52 MG/DL (ref 0.55–1.02)
CREAT SERPL-MCNC: 2.52 MG/DL (ref 0.55–1.02)
CREAT UR-MCNC: 1.8 MG/DL (ref 0.6–1.3)
DIFFERENTIAL METHOD BLD: ABNORMAL
EOSINOPHIL # BLD: 0.2 K/UL (ref 0–0.4)
EOSINOPHIL NFR BLD: 2 % (ref 0–7)
ERYTHROCYTE [DISTWIDTH] IN BLOOD BY AUTOMATED COUNT: 16.4 % (ref 11.5–14.5)
FIO2 ON VENT: 60 %
FIO2 ON VENT: 80 %
FIO2 ON VENT: 80 %
FIO2 ON VENT: 90 %
GAS FLOW.O2 SETTING OXYMISER: 30
GLOBULIN SER CALC-MCNC: 3.1 G/DL (ref 2–4)
GLUCOSE BLD STRIP.AUTO-MCNC: 145 MG/DL (ref 74–106)
GLUCOSE BLD STRIP.AUTO-MCNC: 146 MG/DL (ref 65–117)
GLUCOSE BLD STRIP.AUTO-MCNC: 149 MG/DL (ref 65–117)
GLUCOSE BLD STRIP.AUTO-MCNC: 155 MG/DL (ref 65–117)
GLUCOSE BLD STRIP.AUTO-MCNC: 162 MG/DL (ref 65–117)
GLUCOSE BLD STRIP.AUTO-MCNC: 169 MG/DL (ref 65–117)
GLUCOSE BLD STRIP.AUTO-MCNC: 175 MG/DL (ref 65–117)
GLUCOSE SERPL-MCNC: 158 MG/DL (ref 65–100)
GLUCOSE SERPL-MCNC: 166 MG/DL (ref 65–100)
GLUCOSE SERPL-MCNC: 166 MG/DL (ref 65–100)
GLUCOSE SERPL-MCNC: 170 MG/DL (ref 65–100)
GRAM STN SPEC: NORMAL
HCO3 BLDA-SCNC: 17 MMOL/L (ref 22–26)
HCO3 BLDA-SCNC: 18 MMOL/L (ref 22–26)
HCO3 BLDA-SCNC: 20 MMOL/L
HCO3 BLDV-SCNC: 20 MMOL/L (ref 23–28)
HCT VFR BLD AUTO: 24.2 % (ref 35–47)
HGB BLD-MCNC: 7.7 G/DL (ref 11.5–16)
HGB BLD-MCNC: 8.1 G/DL (ref 11.5–16)
IMM GRANULOCYTES # BLD AUTO: 0 K/UL (ref 0–0.04)
IMM GRANULOCYTES NFR BLD AUTO: 0 % (ref 0–0.5)
LACTATE BLD-SCNC: 0.56 MMOL/L (ref 0.4–2)
LACTATE SERPL-SCNC: 0.8 MMOL/L (ref 0.4–2)
LACTATE SERPL-SCNC: 1.2 MMOL/L (ref 0.4–2)
LYMPHOCYTES # BLD: 0.5 K/UL (ref 0.8–3.5)
LYMPHOCYTES NFR BLD: 5 % (ref 12–49)
MAGNESIUM SERPL-MCNC: 2.6 MG/DL (ref 1.6–2.4)
MCH RBC QN AUTO: 29.7 PG (ref 26–34)
MCHC RBC AUTO-ENTMCNC: 33.5 G/DL (ref 30–36.5)
MCV RBC AUTO: 88.6 FL (ref 80–99)
MONOCYTES # BLD: 0.6 K/UL (ref 0–1)
MONOCYTES NFR BLD: 6 % (ref 5–13)
NEUTS SEG # BLD: 8 K/UL (ref 1.8–8)
NEUTS SEG NFR BLD: 87 % (ref 32–75)
NRBC # BLD: 0.02 K/UL (ref 0–0.01)
NRBC BLD-RTO: 0.2 PER 100 WBC
NT PRO BNP: ABNORMAL PG/ML
PCO2 BLDA: 35 MMHG (ref 35–45)
PCO2 BLDA: 37 MMHG (ref 35–45)
PCO2 BLDA: 38 MMHG (ref 35–45)
PCO2 BLDA: 40 MMHG (ref 35–45)
PCO2 BLDV: 42 MMHG (ref 41–51)
PCO2 BLDV: 44.1 MMHG (ref 41–51)
PEEP RESPIRATORY: 15
PH BLDA: 7.27 (ref 7.35–7.45)
PH BLDA: 7.28 (ref 7.35–7.45)
PH BLDA: 7.3 (ref 7.35–7.45)
PH BLDA: 7.3 (ref 7.35–7.45)
PH BLDV: 7.27 (ref 7.32–7.42)
PH BLDV: 7.29 (ref 7.32–7.42)
PHOSPHATE SERPL-MCNC: 5.6 MG/DL (ref 2.6–4.7)
PLATELET # BLD AUTO: 209 K/UL (ref 150–400)
PMV BLD AUTO: 10.7 FL (ref 8.9–12.9)
PO2 BLDA: 63 MMHG (ref 80–100)
PO2 BLDA: 76 MMHG (ref 80–100)
PO2 BLDA: 84 MMHG (ref 80–100)
PO2 BLDA: 92 MMHG (ref 80–100)
PO2 BLDV: 46 MMHG (ref 25–40)
PO2 BLDV: 47 MMHG (ref 25–40)
POTASSIUM BLD-SCNC: 4.8 MMOL/L (ref 3.5–5.5)
POTASSIUM SERPL-SCNC: 4.7 MMOL/L (ref 3.5–5.1)
POTASSIUM SERPL-SCNC: 4.7 MMOL/L (ref 3.5–5.1)
POTASSIUM SERPL-SCNC: 4.8 MMOL/L (ref 3.5–5.1)
POTASSIUM SERPL-SCNC: 4.9 MMOL/L (ref 3.5–5.1)
PROCALCITONIN SERPL-MCNC: 16.99 NG/ML
PROT SERPL-MCNC: 6 G/DL (ref 6.4–8.2)
RBC # BLD AUTO: 2.73 M/UL (ref 3.8–5.2)
RBC MORPH BLD: ABNORMAL
SAO2 % BLD: 78 %
SAO2 % BLD: 89 % (ref 92–97)
SAO2 % BLD: 94 % (ref 92–97)
SAO2 % BLD: 95 % (ref 92–97)
SAO2 % BLD: 96 % (ref 92–97)
SAO2 % BLDV: 75.4 % (ref 65–88)
SAO2% DEVICE SAO2% SENSOR NAME: ABNORMAL
SERVICE CMNT-IMP: ABNORMAL
SERVICE CMNT-IMP: NORMAL
SODIUM BLD-SCNC: 135 MMOL/L (ref 136–145)
SODIUM SERPL-SCNC: 136 MMOL/L (ref 136–145)
SODIUM SERPL-SCNC: 137 MMOL/L (ref 136–145)
SODIUM SERPL-SCNC: 137 MMOL/L (ref 136–145)
SODIUM SERPL-SCNC: 139 MMOL/L (ref 136–145)
SPECIMEN SITE: ABNORMAL
SPECIMEN TYPE: ABNORMAL
VANCOMYCIN SERPL-MCNC: 16.5 UG/ML
VENTILATION MODE VENT: ABNORMAL
VT SETTING VENT: 275
WBC # BLD AUTO: 9.3 K/UL (ref 3.6–11)

## 2023-11-05 PROCEDURE — 84145 PROCALCITONIN (PCT): CPT

## 2023-11-05 PROCEDURE — 82330 ASSAY OF CALCIUM: CPT

## 2023-11-05 PROCEDURE — A4216 STERILE WATER/SALINE, 10 ML: HCPCS | Performed by: HOSPITALIST

## 2023-11-05 PROCEDURE — 85018 HEMOGLOBIN: CPT

## 2023-11-05 PROCEDURE — 83605 ASSAY OF LACTIC ACID: CPT

## 2023-11-05 PROCEDURE — 80048 BASIC METABOLIC PNL TOTAL CA: CPT

## 2023-11-05 PROCEDURE — 6360000002 HC RX W HCPCS: Performed by: HOSPITALIST

## 2023-11-05 PROCEDURE — 6360000002 HC RX W HCPCS: Performed by: NURSE PRACTITIONER

## 2023-11-05 PROCEDURE — 80076 HEPATIC FUNCTION PANEL: CPT

## 2023-11-05 PROCEDURE — 6370000000 HC RX 637 (ALT 250 FOR IP): Performed by: HOSPITALIST

## 2023-11-05 PROCEDURE — 84295 ASSAY OF SERUM SODIUM: CPT

## 2023-11-05 PROCEDURE — 2500000003 HC RX 250 WO HCPCS: Performed by: NURSE PRACTITIONER

## 2023-11-05 PROCEDURE — 82803 BLOOD GASES ANY COMBINATION: CPT

## 2023-11-05 PROCEDURE — 85025 COMPLETE CBC W/AUTO DIFF WBC: CPT

## 2023-11-05 PROCEDURE — 84132 ASSAY OF SERUM POTASSIUM: CPT

## 2023-11-05 PROCEDURE — 80202 ASSAY OF VANCOMYCIN: CPT

## 2023-11-05 PROCEDURE — 6370000000 HC RX 637 (ALT 250 FOR IP): Performed by: STUDENT IN AN ORGANIZED HEALTH CARE EDUCATION/TRAINING PROGRAM

## 2023-11-05 PROCEDURE — 37799 UNLISTED PX VASCULAR SURGERY: CPT

## 2023-11-05 PROCEDURE — 6370000000 HC RX 637 (ALT 250 FOR IP): Performed by: NURSE PRACTITIONER

## 2023-11-05 PROCEDURE — 94003 VENT MGMT INPAT SUBQ DAY: CPT

## 2023-11-05 PROCEDURE — 2000000000 HC ICU R&B

## 2023-11-05 PROCEDURE — 83735 ASSAY OF MAGNESIUM: CPT

## 2023-11-05 PROCEDURE — 83880 ASSAY OF NATRIURETIC PEPTIDE: CPT

## 2023-11-05 PROCEDURE — 82947 ASSAY GLUCOSE BLOOD QUANT: CPT

## 2023-11-05 PROCEDURE — 74018 RADEX ABDOMEN 1 VIEW: CPT

## 2023-11-05 PROCEDURE — 6370000000 HC RX 637 (ALT 250 FOR IP): Performed by: CLINICAL NURSE SPECIALIST

## 2023-11-05 PROCEDURE — 94640 AIRWAY INHALATION TREATMENT: CPT

## 2023-11-05 PROCEDURE — C9113 INJ PANTOPRAZOLE SODIUM, VIA: HCPCS | Performed by: HOSPITALIST

## 2023-11-05 PROCEDURE — 87040 BLOOD CULTURE FOR BACTERIA: CPT

## 2023-11-05 PROCEDURE — 84100 ASSAY OF PHOSPHORUS: CPT

## 2023-11-05 PROCEDURE — 36415 COLL VENOUS BLD VENIPUNCTURE: CPT

## 2023-11-05 PROCEDURE — 82962 GLUCOSE BLOOD TEST: CPT

## 2023-11-05 PROCEDURE — 2580000003 HC RX 258: Performed by: HOSPITALIST

## 2023-11-05 RX ORDER — SODIUM CHLORIDE, SODIUM LACTATE, POTASSIUM CHLORIDE, AND CALCIUM CHLORIDE .6; .31; .03; .02 G/100ML; G/100ML; G/100ML; G/100ML
1000 INJECTION, SOLUTION INTRAVENOUS ONCE
Status: COMPLETED | OUTPATIENT
Start: 2023-11-05 | End: 2023-11-05

## 2023-11-05 RX ORDER — BUMETANIDE 0.25 MG/ML
1 INJECTION INTRAMUSCULAR; INTRAVENOUS ONCE
Status: COMPLETED | OUTPATIENT
Start: 2023-11-05 | End: 2023-11-05

## 2023-11-05 RX ORDER — POLYETHYLENE GLYCOL 3350 17 G/17G
17 POWDER, FOR SOLUTION ORAL ONCE
Status: COMPLETED | OUTPATIENT
Start: 2023-11-05 | End: 2023-11-05

## 2023-11-05 RX ORDER — SENNA AND DOCUSATE SODIUM 50; 8.6 MG/1; MG/1
1 TABLET, FILM COATED ORAL 2 TIMES DAILY
Status: DISCONTINUED | OUTPATIENT
Start: 2023-11-05 | End: 2023-11-07

## 2023-11-05 RX ADMIN — CEFTRIAXONE SODIUM 2000 MG: 2 INJECTION, POWDER, FOR SOLUTION INTRAMUSCULAR; INTRAVENOUS at 09:33

## 2023-11-05 RX ADMIN — SODIUM CHLORIDE, PRESERVATIVE FREE 40 MG: 5 INJECTION INTRAVENOUS at 08:40

## 2023-11-05 RX ADMIN — CHLORHEXIDINE GLUCONATE 15 ML: 1.2 RINSE ORAL at 09:00

## 2023-11-05 RX ADMIN — Medication: at 20:35

## 2023-11-05 RX ADMIN — INSULIN HUMAN 7 UNITS: 100 INJECTION, SUSPENSION SUBCUTANEOUS at 01:43

## 2023-11-05 RX ADMIN — PROPOFOL 35 MCG/KG/MIN: 10 INJECTION, EMULSION INTRAVENOUS at 07:17

## 2023-11-05 RX ADMIN — SODIUM CHLORIDE, POTASSIUM CHLORIDE, SODIUM LACTATE AND CALCIUM CHLORIDE 1000 ML: 600; 310; 30; 20 INJECTION, SOLUTION INTRAVENOUS at 11:38

## 2023-11-05 RX ADMIN — AZITHROMYCIN MONOHYDRATE 500 MG: 500 INJECTION, POWDER, LYOPHILIZED, FOR SOLUTION INTRAVENOUS at 18:39

## 2023-11-05 RX ADMIN — METHYLPREDNISOLONE SODIUM SUCCINATE 40 MG: 40 INJECTION, POWDER, FOR SOLUTION INTRAMUSCULAR; INTRAVENOUS at 17:40

## 2023-11-05 RX ADMIN — CHLORHEXIDINE GLUCONATE 15 ML: 1.2 RINSE ORAL at 20:37

## 2023-11-05 RX ADMIN — IPRATROPIUM BROMIDE AND ALBUTEROL SULFATE 1 DOSE: 2.5; .5 SOLUTION RESPIRATORY (INHALATION) at 04:33

## 2023-11-05 RX ADMIN — PROPOFOL 15 MCG/KG/MIN: 10 INJECTION, EMULSION INTRAVENOUS at 14:59

## 2023-11-05 RX ADMIN — IPRATROPIUM BROMIDE AND ALBUTEROL SULFATE 1 DOSE: 2.5; .5 SOLUTION RESPIRATORY (INHALATION) at 21:16

## 2023-11-05 RX ADMIN — INSULIN HUMAN 7 UNITS: 100 INJECTION, SUSPENSION SUBCUTANEOUS at 14:28

## 2023-11-05 RX ADMIN — IPRATROPIUM BROMIDE AND ALBUTEROL SULFATE 1 DOSE: 2.5; .5 SOLUTION RESPIRATORY (INHALATION) at 12:34

## 2023-11-05 RX ADMIN — Medication 100 MCG/HR: at 15:57

## 2023-11-05 RX ADMIN — IPRATROPIUM BROMIDE AND ALBUTEROL SULFATE 1 DOSE: 2.5; .5 SOLUTION RESPIRATORY (INHALATION) at 00:27

## 2023-11-05 RX ADMIN — IPRATROPIUM BROMIDE AND ALBUTEROL SULFATE 1 DOSE: 2.5; .5 SOLUTION RESPIRATORY (INHALATION) at 09:09

## 2023-11-05 RX ADMIN — SODIUM CHLORIDE, PRESERVATIVE FREE 10 ML: 5 INJECTION INTRAVENOUS at 19:56

## 2023-11-05 RX ADMIN — SODIUM CHLORIDE, PRESERVATIVE FREE 40 MG: 5 INJECTION INTRAVENOUS at 19:54

## 2023-11-05 RX ADMIN — Medication 200 MCG/HR: at 09:09

## 2023-11-05 RX ADMIN — SODIUM CHLORIDE, POTASSIUM CHLORIDE, SODIUM LACTATE AND CALCIUM CHLORIDE 1000 ML: 600; 310; 30; 20 INJECTION, SOLUTION INTRAVENOUS at 08:38

## 2023-11-05 RX ADMIN — METHYLPREDNISOLONE SODIUM SUCCINATE 40 MG: 40 INJECTION, POWDER, FOR SOLUTION INTRAMUSCULAR; INTRAVENOUS at 05:37

## 2023-11-05 RX ADMIN — Medication 1 AMPULE: at 20:34

## 2023-11-05 RX ADMIN — Medication 1 AMPULE: at 08:48

## 2023-11-05 RX ADMIN — PROPOFOL 45 MCG/KG/MIN: 10 INJECTION, EMULSION INTRAVENOUS at 01:43

## 2023-11-05 RX ADMIN — SENNOSIDES AND DOCUSATE SODIUM 1 TABLET: 50; 8.6 TABLET ORAL at 20:30

## 2023-11-05 RX ADMIN — POLYETHYLENE GLYCOL 3350 17 G: 17 POWDER, FOR SOLUTION ORAL at 20:30

## 2023-11-05 RX ADMIN — Medication 200 MCG/HR: at 00:29

## 2023-11-05 RX ADMIN — SODIUM CHLORIDE, PRESERVATIVE FREE 10 ML: 5 INJECTION INTRAVENOUS at 08:48

## 2023-11-05 RX ADMIN — ENOXAPARIN SODIUM 30 MG: 100 INJECTION SUBCUTANEOUS at 08:40

## 2023-11-05 RX ADMIN — BUMETANIDE 1 MG: 0.25 INJECTION INTRAMUSCULAR; INTRAVENOUS at 20:29

## 2023-11-05 RX ADMIN — Medication: at 08:40

## 2023-11-05 RX ADMIN — Medication 200 MCG/HR: at 04:21

## 2023-11-05 RX ADMIN — VANCOMYCIN HYDROCHLORIDE 750 MG: 750 INJECTION, POWDER, LYOPHILIZED, FOR SOLUTION INTRAVENOUS at 14:05

## 2023-11-05 RX ADMIN — IPRATROPIUM BROMIDE AND ALBUTEROL SULFATE 1 DOSE: 2.5; .5 SOLUTION RESPIRATORY (INHALATION) at 15:49

## 2023-11-05 ASSESSMENT — PULMONARY FUNCTION TESTS
PIF_VALUE: 31
PIF_VALUE: 29
PIF_VALUE: 28
PIF_VALUE: 32
PIF_VALUE: 29
PIF_VALUE: 28

## 2023-11-05 ASSESSMENT — PAIN SCALES - GENERAL
PAINLEVEL_OUTOF10: 0

## 2023-11-05 NOTE — PROGRESS NOTES
8794: Bedside and verbal shift change report completed by Simona Bedolla, RN to Essence Tse RN. Report included the following information SBAR, Kardex, Intake/Output, MAR, and Cardiac Rhythm NSR/sinus tachy. 0800: Assessment completed. Pt repositioned in bed. 1000: Dr. Dominic Iyer MD at the bedside. 1000 cc LR fluid bolus given. Pt repositioned in bed. 75 cc urine out for the hour. VBG, ABG, lactic, hemoglobin, BMP drawn. Pt repositioned in bed.    1100: Tube feeds starting after KUB verified placement. 1142: 1000 cc LR fluid bolus given. 1200: Reassessment competed. Pt repositioned in bed.     1400: Pt repositioned in bed. FiO2 on vent changed from 90% to 80%. 1600: Dr. Dominic Iyer MD at the bedside. MD changed FiO2 on vent to 70% per ABG results. MD updated family at bedside. Reassessment completed. Pt repositioned in bed. 1800: Pt repositioned in bed. 1930: Bedside and verbal shift change report completed by Essence Tse, RN to Simona Bedolla, LEANN. Report included the following information SBAR, Kardex, Intake/Output, MAR, and Cardiac Rhythm NSR/sinus tachy.

## 2023-11-05 NOTE — PROGRESS NOTES
11/05/23 0535   B: Both Spontaneous Awakening and Breathing Trials   Safety Screening Spontaneous Breathing Trial (SBT) FIO2 is greater than 50%  (PEEP = +15)

## 2023-11-05 NOTE — PROGRESS NOTES
has a past surgical history that includes gyn; Hysterectomy; Ovary removal; Appendectomy (2032); cervical laminectomy (1989, 2005); Cardiac catheterization (2011); orthopedic surgery (2013, 2014); orthopedic surgery (1987); and orthopedic surgery (1984). Home Medications:     Prior to Admission medications    Medication Sig Start Date End Date Taking? Authorizing Provider   HYDROmorphone (DILAUDID) 2 MG tablet Take 1 tablet by mouth every 8 hours as needed for Pain Severe (7-10). 10/2/23   Modesta Montes De Oca MD   SYNTHROID 25 MCG tablet Take 1 tablet by mouth every morning (before breakfast) 10/13/23   Modesta Montes De Oca MD   Semaglutide,0.25 or 0.5MG/DOS, (OZEMPIC, 0.25 OR 0.5 MG/DOSE,) 2 MG/1.5ML SOPN Inject into the skin every 7 days    Modesta Montes De Oca MD   ondansetron (ZOFRAN-ODT) 4 MG disintegrating tablet Place 1 tablet under the tongue every 8 hours as needed for Nausea or Vomiting 10/2/23   Katy Ferris DO   amLODIPine (NORVASC) 2.5 MG tablet TAKE 1 TABLET TWICE A DAY 8/17/23   Josselin West, APRN - NP   albuterol (PROVENTIL) (2.5 MG/3ML) 0.083% nebulizer solution Inhale 3 mLs into the lungs every 4 hours as needed for Shortness of Breath    Automatic Reconciliation, Ar   aspirin 81 MG EC tablet Take 1 tablet by mouth daily 11/9/18   Automatic Reconciliation, Ar   Budeson-Glycopyrrol-Formoterol (BREZTRI AEROSPHERE) 160-9-4.8 MCG/ACT AERO Inhale 2 puffs into the lungs 2 times daily 6/21/22   Automatic Reconciliation, Ar   cyclobenzaprine (FLEXERIL) 10 MG tablet Take 1 tablet by mouth 3 times daily as needed    Automatic Reconciliation, Ar   diclofenac sodium (VOLTAREN) 1 % GEL as needed    Automatic Reconciliation, Ar   diphenhydrAMINE (BENADRYL) 25 MG capsule Take 1 capsule by mouth every 6 hours as needed    Automatic Reconciliation, Ar   estrogens conjugated (PREMARIN) 0.625 MG/GM CREA vaginal cream every Tuesday and Friday.     Automatic Reconciliation, Ar   furosemide (LASIX)

## 2023-11-05 NOTE — PLAN OF CARE
During the day, able to wean the sedation resulting in improved hemodynamics and off levophed. Urine out put is around 20-30 ml per hour. She is becoming fluid overload. Will attempt dose of bumex x 1 and watch for the response. Wean diuresis as tolerated.

## 2023-11-06 ENCOUNTER — APPOINTMENT (OUTPATIENT)
Facility: HOSPITAL | Age: 73
DRG: 853 | End: 2023-11-06
Payer: MEDICARE

## 2023-11-06 LAB
ALBUMIN SERPL-MCNC: 2.8 G/DL (ref 3.5–5)
ALBUMIN/GLOB SERPL: 0.9 (ref 1.1–2.2)
ALP SERPL-CCNC: 60 U/L (ref 45–117)
ALT SERPL-CCNC: 86 U/L (ref 12–78)
ANION GAP SERPL CALC-SCNC: 10 MMOL/L (ref 5–15)
ANION GAP SERPL CALC-SCNC: 6 MMOL/L (ref 5–15)
ARTERIAL PATENCY WRIST A: ABNORMAL
AST SERPL-CCNC: 45 U/L (ref 15–37)
BACTERIA SPEC CULT: ABNORMAL
BASE DEFICIT BLDA-SCNC: 6.3 MMOL/L
BASOPHILS # BLD: 0 K/UL (ref 0–0.1)
BASOPHILS NFR BLD: 0 % (ref 0–1)
BDY SITE: ABNORMAL
BILIRUB DIRECT SERPL-MCNC: 0.5 MG/DL (ref 0–0.2)
BILIRUB SERPL-MCNC: 0.9 MG/DL (ref 0.2–1)
BUN SERPL-MCNC: 65 MG/DL (ref 6–20)
BUN SERPL-MCNC: 68 MG/DL (ref 6–20)
BUN/CREAT SERPL: 27 (ref 12–20)
BUN/CREAT SERPL: 29 (ref 12–20)
CALCIUM SERPL-MCNC: 7.5 MG/DL (ref 8.5–10.1)
CALCIUM SERPL-MCNC: 7.8 MG/DL (ref 8.5–10.1)
CHLORIDE SERPL-SCNC: 108 MMOL/L (ref 97–108)
CHLORIDE SERPL-SCNC: 111 MMOL/L (ref 97–108)
CO2 SERPL-SCNC: 21 MMOL/L (ref 21–32)
CO2 SERPL-SCNC: 25 MMOL/L (ref 21–32)
CREAT SERPL-MCNC: 2.31 MG/DL (ref 0.55–1.02)
CREAT SERPL-MCNC: 2.44 MG/DL (ref 0.55–1.02)
DIFFERENTIAL METHOD BLD: ABNORMAL
EOSINOPHIL # BLD: 0 K/UL (ref 0–0.4)
EOSINOPHIL NFR BLD: 0 % (ref 0–7)
ERYTHROCYTE [DISTWIDTH] IN BLOOD BY AUTOMATED COUNT: 16.7 % (ref 11.5–14.5)
FIO2 ON VENT: 60 %
FLUID CULTURE: NORMAL
GAS FLOW.O2 SETTING OXYMISER: 30
GLOBULIN SER CALC-MCNC: 3.1 G/DL (ref 2–4)
GLUCOSE BLD STRIP.AUTO-MCNC: 155 MG/DL (ref 65–117)
GLUCOSE BLD STRIP.AUTO-MCNC: 159 MG/DL (ref 65–117)
GLUCOSE BLD STRIP.AUTO-MCNC: 173 MG/DL (ref 65–117)
GLUCOSE BLD STRIP.AUTO-MCNC: 186 MG/DL (ref 65–117)
GLUCOSE BLD STRIP.AUTO-MCNC: 197 MG/DL (ref 65–117)
GLUCOSE SERPL-MCNC: 170 MG/DL (ref 65–100)
GLUCOSE SERPL-MCNC: 203 MG/DL (ref 65–100)
HCO3 BLDA-SCNC: 20 MMOL/L (ref 22–26)
HCT VFR BLD AUTO: 24.3 % (ref 35–47)
HGB BLD-MCNC: 7.8 G/DL (ref 11.5–16)
IMM GRANULOCYTES # BLD AUTO: 0 K/UL (ref 0–0.04)
IMM GRANULOCYTES NFR BLD AUTO: 0 % (ref 0–0.5)
L PNEUMO1 AG UR QL IA: NEGATIVE
LACTATE SERPL-SCNC: 0.9 MMOL/L (ref 0.4–2)
LYMPHOCYTES # BLD: 0.6 K/UL (ref 0.8–3.5)
LYMPHOCYTES NFR BLD: 5 % (ref 12–49)
Lab: NORMAL
MAGNESIUM SERPL-MCNC: 2.7 MG/DL (ref 1.6–2.4)
MCH RBC QN AUTO: 28.6 PG (ref 26–34)
MCHC RBC AUTO-ENTMCNC: 32.1 G/DL (ref 30–36.5)
MCV RBC AUTO: 89 FL (ref 80–99)
METAMYELOCYTES NFR BLD MANUAL: 2 %
MONOCYTES # BLD: 0.2 K/UL (ref 0–1)
MONOCYTES NFR BLD: 2 % (ref 5–13)
MYELOCYTES NFR BLD MANUAL: 1 %
NEUTS SEG # BLD: 10.3 K/UL (ref 1.8–8)
NEUTS SEG NFR BLD: 90 % (ref 32–75)
NRBC # BLD: 0.07 K/UL (ref 0–0.01)
NRBC BLD-RTO: 0.6 PER 100 WBC
ORGANISM ID: NORMAL
PCO2 BLDA: 40 MMHG (ref 35–45)
PEEP RESPIRATORY: 13
PH BLDA: 7.31 (ref 7.35–7.45)
PHOSPHATE SERPL-MCNC: 5.2 MG/DL (ref 2.6–4.7)
PLATELET # BLD AUTO: 254 K/UL (ref 150–400)
PMV BLD AUTO: 10 FL (ref 8.9–12.9)
PO2 BLDA: 96 MMHG (ref 80–100)
POTASSIUM SERPL-SCNC: 4.8 MMOL/L (ref 3.5–5.1)
POTASSIUM SERPL-SCNC: 4.9 MMOL/L (ref 3.5–5.1)
PROCALCITONIN SERPL-MCNC: 11.33 NG/ML
PROT SERPL-MCNC: 5.9 G/DL (ref 6.4–8.2)
RBC # BLD AUTO: 2.73 M/UL (ref 3.8–5.2)
RBC MORPH BLD: ABNORMAL
S PNEUM AG SPEC QL LA: NEGATIVE
SAO2 % BLD: 97 % (ref 92–97)
SAO2% DEVICE SAO2% SENSOR NAME: ABNORMAL
SERVICE CMNT-IMP: ABNORMAL
SODIUM SERPL-SCNC: 139 MMOL/L (ref 136–145)
SODIUM SERPL-SCNC: 142 MMOL/L (ref 136–145)
SPECIMEN SITE: ABNORMAL
SPECIMEN SOURCE: NORMAL
SPECIMEN SOURCE: NORMAL
SPECIMEN: NORMAL
VANCOMYCIN SERPL-MCNC: 21.5 UG/ML
VENTILATION MODE VENT: ABNORMAL
VT SETTING VENT: 275
WBC # BLD AUTO: 11.4 K/UL (ref 3.6–11)
WBC MORPH BLD: ABNORMAL

## 2023-11-06 PROCEDURE — C9113 INJ PANTOPRAZOLE SODIUM, VIA: HCPCS | Performed by: HOSPITALIST

## 2023-11-06 PROCEDURE — 84100 ASSAY OF PHOSPHORUS: CPT

## 2023-11-06 PROCEDURE — 6370000000 HC RX 637 (ALT 250 FOR IP): Performed by: HOSPITALIST

## 2023-11-06 PROCEDURE — 36415 COLL VENOUS BLD VENIPUNCTURE: CPT

## 2023-11-06 PROCEDURE — 6360000002 HC RX W HCPCS: Performed by: HOSPITALIST

## 2023-11-06 PROCEDURE — 80202 ASSAY OF VANCOMYCIN: CPT

## 2023-11-06 PROCEDURE — 80048 BASIC METABOLIC PNL TOTAL CA: CPT

## 2023-11-06 PROCEDURE — 6370000000 HC RX 637 (ALT 250 FOR IP): Performed by: CLINICAL NURSE SPECIALIST

## 2023-11-06 PROCEDURE — 2500000003 HC RX 250 WO HCPCS: Performed by: INTERNAL MEDICINE

## 2023-11-06 PROCEDURE — 6370000000 HC RX 637 (ALT 250 FOR IP): Performed by: NURSE PRACTITIONER

## 2023-11-06 PROCEDURE — A4216 STERILE WATER/SALINE, 10 ML: HCPCS | Performed by: HOSPITALIST

## 2023-11-06 PROCEDURE — 71045 X-RAY EXAM CHEST 1 VIEW: CPT

## 2023-11-06 PROCEDURE — 94003 VENT MGMT INPAT SUBQ DAY: CPT

## 2023-11-06 PROCEDURE — 94640 AIRWAY INHALATION TREATMENT: CPT

## 2023-11-06 PROCEDURE — 37799 UNLISTED PX VASCULAR SURGERY: CPT

## 2023-11-06 PROCEDURE — 83605 ASSAY OF LACTIC ACID: CPT

## 2023-11-06 PROCEDURE — 6370000000 HC RX 637 (ALT 250 FOR IP): Performed by: STUDENT IN AN ORGANIZED HEALTH CARE EDUCATION/TRAINING PROGRAM

## 2023-11-06 PROCEDURE — 83735 ASSAY OF MAGNESIUM: CPT

## 2023-11-06 PROCEDURE — 84145 PROCALCITONIN (PCT): CPT

## 2023-11-06 PROCEDURE — 2580000003 HC RX 258: Performed by: HOSPITALIST

## 2023-11-06 PROCEDURE — 99231 SBSQ HOSP IP/OBS SF/LOW 25: CPT | Performed by: CLINICAL NURSE SPECIALIST

## 2023-11-06 PROCEDURE — 80076 HEPATIC FUNCTION PANEL: CPT

## 2023-11-06 PROCEDURE — 6370000000 HC RX 637 (ALT 250 FOR IP): Performed by: INTERNAL MEDICINE

## 2023-11-06 PROCEDURE — 82962 GLUCOSE BLOOD TEST: CPT

## 2023-11-06 PROCEDURE — 2000000000 HC ICU R&B

## 2023-11-06 PROCEDURE — 82803 BLOOD GASES ANY COMBINATION: CPT

## 2023-11-06 PROCEDURE — 85025 COMPLETE CBC W/AUTO DIFF WBC: CPT

## 2023-11-06 PROCEDURE — 2580000003 HC RX 258: Performed by: INTERNAL MEDICINE

## 2023-11-06 RX ORDER — INSULIN LISPRO 100 [IU]/ML
0-8 INJECTION, SOLUTION INTRAVENOUS; SUBCUTANEOUS EVERY 6 HOURS
Status: DISCONTINUED | OUTPATIENT
Start: 2023-11-06 | End: 2023-11-09 | Stop reason: HOSPADM

## 2023-11-06 RX ORDER — BUMETANIDE 0.25 MG/ML
2 INJECTION INTRAMUSCULAR; INTRAVENOUS ONCE
Status: COMPLETED | OUTPATIENT
Start: 2023-11-06 | End: 2023-11-06

## 2023-11-06 RX ORDER — DEXMEDETOMIDINE HYDROCHLORIDE 4 UG/ML
0-1.5 INJECTION, SOLUTION INTRAVENOUS CONTINUOUS
Status: DISCONTINUED | OUTPATIENT
Start: 2023-11-06 | End: 2023-11-09 | Stop reason: HOSPADM

## 2023-11-06 RX ORDER — NOREPINEPHRINE BITARTRATE 0.06 MG/ML
0-20 INJECTION, SOLUTION INTRAVENOUS CONTINUOUS
Status: DISCONTINUED | OUTPATIENT
Start: 2023-11-06 | End: 2023-11-07

## 2023-11-06 RX ORDER — IPRATROPIUM BROMIDE AND ALBUTEROL SULFATE 2.5; .5 MG/3ML; MG/3ML
1 SOLUTION RESPIRATORY (INHALATION) EVERY 6 HOURS
Status: DISCONTINUED | OUTPATIENT
Start: 2023-11-06 | End: 2023-11-09 | Stop reason: HOSPADM

## 2023-11-06 RX ORDER — POLYETHYLENE GLYCOL 3350 17 G/17G
17 POWDER, FOR SOLUTION ORAL DAILY
Status: DISCONTINUED | OUTPATIENT
Start: 2023-11-06 | End: 2023-11-07

## 2023-11-06 RX ADMIN — POLYETHYLENE GLYCOL 3350 17 G: 17 POWDER, FOR SOLUTION ORAL at 12:08

## 2023-11-06 RX ADMIN — CHLORHEXIDINE GLUCONATE 15 ML: 1.2 RINSE ORAL at 09:09

## 2023-11-06 RX ADMIN — INSULIN HUMAN 10 UNITS: 100 INJECTION, SUSPENSION SUBCUTANEOUS at 13:34

## 2023-11-06 RX ADMIN — SODIUM CHLORIDE, PRESERVATIVE FREE 40 MG: 5 INJECTION INTRAVENOUS at 09:06

## 2023-11-06 RX ADMIN — Medication: at 09:08

## 2023-11-06 RX ADMIN — Medication 1 AMPULE: at 19:52

## 2023-11-06 RX ADMIN — INSULIN HUMAN 7 UNITS: 100 INJECTION, SUSPENSION SUBCUTANEOUS at 02:02

## 2023-11-06 RX ADMIN — CEFTRIAXONE SODIUM 2000 MG: 2 INJECTION, POWDER, FOR SOLUTION INTRAMUSCULAR; INTRAVENOUS at 10:02

## 2023-11-06 RX ADMIN — Medication 1 AMPULE: at 09:06

## 2023-11-06 RX ADMIN — DEXMEDETOMIDINE 0.6 MCG/KG/HR: 100 INJECTION, SOLUTION INTRAVENOUS at 23:39

## 2023-11-06 RX ADMIN — CHLORHEXIDINE GLUCONATE 15 ML: 1.2 RINSE ORAL at 19:58

## 2023-11-06 RX ADMIN — METHYLPREDNISOLONE SODIUM SUCCINATE 40 MG: 40 INJECTION, POWDER, FOR SOLUTION INTRAMUSCULAR; INTRAVENOUS at 05:29

## 2023-11-06 RX ADMIN — SODIUM CHLORIDE, PRESERVATIVE FREE 40 MG: 5 INJECTION INTRAVENOUS at 19:52

## 2023-11-06 RX ADMIN — IPRATROPIUM BROMIDE AND ALBUTEROL SULFATE 1 DOSE: 2.5; .5 SOLUTION RESPIRATORY (INHALATION) at 13:40

## 2023-11-06 RX ADMIN — Medication 50 MCG/HR: at 12:07

## 2023-11-06 RX ADMIN — IPRATROPIUM BROMIDE AND ALBUTEROL SULFATE 1 DOSE: 2.5; .5 SOLUTION RESPIRATORY (INHALATION) at 19:25

## 2023-11-06 RX ADMIN — SENNOSIDES AND DOCUSATE SODIUM 1 TABLET: 50; 8.6 TABLET ORAL at 19:52

## 2023-11-06 RX ADMIN — LEVOTHYROXINE SODIUM 25 MCG: 0.05 TABLET ORAL at 07:50

## 2023-11-06 RX ADMIN — SENNOSIDES AND DOCUSATE SODIUM 1 TABLET: 50; 8.6 TABLET ORAL at 09:06

## 2023-11-06 RX ADMIN — Medication: at 20:02

## 2023-11-06 RX ADMIN — IPRATROPIUM BROMIDE AND ALBUTEROL SULFATE 1 DOSE: 2.5; .5 SOLUTION RESPIRATORY (INHALATION) at 00:15

## 2023-11-06 RX ADMIN — SODIUM CHLORIDE, PRESERVATIVE FREE 10 ML: 5 INJECTION INTRAVENOUS at 19:52

## 2023-11-06 RX ADMIN — ENOXAPARIN SODIUM 30 MG: 100 INJECTION SUBCUTANEOUS at 09:00

## 2023-11-06 RX ADMIN — BUMETANIDE 2 MG: 0.25 INJECTION INTRAMUSCULAR; INTRAVENOUS at 12:09

## 2023-11-06 RX ADMIN — SODIUM CHLORIDE, PRESERVATIVE FREE 10 ML: 5 INJECTION INTRAVENOUS at 09:10

## 2023-11-06 RX ADMIN — METHYLPREDNISOLONE SODIUM SUCCINATE 40 MG: 40 INJECTION, POWDER, FOR SOLUTION INTRAMUSCULAR; INTRAVENOUS at 18:03

## 2023-11-06 RX ADMIN — Medication 100 MCG/HR: at 02:42

## 2023-11-06 RX ADMIN — IPRATROPIUM BROMIDE AND ALBUTEROL SULFATE 1 DOSE: 2.5; .5 SOLUTION RESPIRATORY (INHALATION) at 04:02

## 2023-11-06 RX ADMIN — DEXMEDETOMIDINE 0.2 MCG/KG/HR: 100 INJECTION, SOLUTION INTRAVENOUS at 10:15

## 2023-11-06 ASSESSMENT — PAIN SCALES - GENERAL
PAINLEVEL_OUTOF10: 0

## 2023-11-06 ASSESSMENT — PULMONARY FUNCTION TESTS
PIF_VALUE: 29
PIF_VALUE: 27
PIF_VALUE: 24
PIF_VALUE: 30
PIF_VALUE: 25
PIF_VALUE: 28
PIF_VALUE: 24

## 2023-11-06 NOTE — PROGRESS NOTES
Comprehensive Nutrition Assessment    Type and Reason for Visit:  Initial (new TF order)    Nutrition Recommendations/Plan:   Recommend advancing TF to Goal of Jevity 1.5 @ 40mL/h (provides 1440kcals/61gPro/207gCHO/909mL)   If renal function improves, would add Prosource 1 packet daily (provides an additional 20gPro/80gkcals)  Recommend increasing bowel regimen (add glycolax today and suppository tomorrow if no results)      Malnutrition Assessment:  Malnutrition Status:  Insufficient data (11/06/23 1413)      Nutrition Assessment:  Pt admitted with acute respiratory failure/ARDS. PMH: CAD, HTN, CKD stage 3, COPD. Chart reviewed for new TF order, case discussed during CCU rounds. Pt intubated. No family in the room to speak with. No overt fat/muscle wasting noted. EMR reveals significant wt loss over the past 6-10 months, however there is no wt source for current wt so unable to determine accuracy or cause. TF started over the weekend, initially rate of 20mL/h which is inadequate to meet est needs. Provided above recommendations to Intensivist to better meet kcal and protein needs. Will hold on protein modular for now given renal function, could potentially add later this week if improved. K 4.9 and phos 5.2, will monitor. No BM in several days and residuals are climbing, imaging shows stool so senokot scheduled BID, 1 time glycolax ordered yesterday. Recommend scheduling glycolax and adding suppository tomorrow if no results. Wt Readings from Last 10 Encounters:   11/02/23 75.3 kg (166 lb 0.1 oz)   11/01/23 75.3 kg (166 lb)   10/02/23 78.5 kg (173 lb)   07/21/23 78.5 kg (173 lb)   04/10/23 86.2 kg (190 lb)   03/27/23 88.9 kg (196 lb)   03/01/23 88.9 kg (196 lb)   02/13/23 84.8 kg (187 lb)   01/16/23 84.8 kg (187 lb)   12/16/22 84.8 kg (187 lb)            Nutrition Related Findings:    Meds: bumex, humalog, insulin NPH, synthroid, solumedrol, vancomycin, protonix, glycolax, senokot.   Edema: +2-BUE,

## 2023-11-06 NOTE — INTERDISCIPLINARY ROUNDS
Critical care interdisciplinary rounds today. Following members present: Case Management, , Clinical Lead, Diabetes Team, Nursing, Nutrition, Pharmacy, and Physician. Family invited to participate. Plan of care discussed. See clinical pathway for plan of care and interventions and desired outcomes. Pt. Remains in BL wrist restraints. SAT in progress. Keep CVC as patient just came off pressors an hour ago. Keep Stauffer per Dr. Serrato Necessary for strict I/O's in setting of rising Cr.

## 2023-11-06 NOTE — PROGRESS NOTES
Physician Progress Note      PATIENT:               Sal Palma  CSN #:                  [id number]  :                       1950  ADMIT DATE:       2023 4:51 PM  [facility] DATE:  Lc Dinh  PROVIDER #:        Castro Burciaga MD          QUERY TEXT:    Good Morning    This patient admitted on 2023- present for Acute Respiratory failure,   Multifocal Pneumonia and UTI. If possible, please document in the progress notes and discharge summary if   you are evaluating and /or treating any of the following:]    The medical record reflects the following:  Risk Factors: Multifocal Pneumonia, Enterococcal Bacteremia, UTI  Clinical Indicators: 68 yr old female presented to outside hospital for   dyspnea , Abhijeet. Apical Pneumonia, transferred here for higher level of care. Initially on BIpap, and then intubated, 3/4 SIRS, wbc 7 but with elevated   neutrophils,, HR consistently in the 116-120--resp high 30s. , Systolic BP 32N   and MAP in the 60s--requiring IV pressors, Blood cx, +Enterococcus faecalis,   CXR + Severe biapical pneumonia 4.1--and up as high a 8.7 . Initially on   bipap, then required Intubation. Treatment: ICU level care with Mechanical vent, IV , Blood cx x2, Resp cx. IV   Zithromax, IV Rocephin, Received mx IVF LR boluses, and then required   pressors. , Lactic acid assessed,    Thank you  Meeta Sal, CPHQ, CCDS, SMART  Options provided:  -- Sepsis present on admission  -- Sepsis POA was ruled out  -- Other - I will add my own diagnosis  -- Disagree - Not applicable / Not valid  -- Disagree - Clinically unable to determine / Unknown  -- Refer to Clinical Documentation Reviewer    PROVIDER RESPONSE TEXT:    This patient has sepsis which was present on admission.     Query created by: Nigel Villatoro on 2023 8:13 AM      Electronically signed by:  Castro Burciaga MD 2023 8:19 AM

## 2023-11-06 NOTE — CONSULTS
IP Cardiology Consult       Date of consult:  11/06/23  Date of admission: 11/2/2023  Primary Cardiologist: Dr Varghese Maldonado   Physician Requesting consult: Dr Duran Bridge:    Sepsis with enterococcal bacteremia   ARDS  Shock   Trop elevation - Type 2 NSTEMI  Minimal CAD by Cath 2012       Recommendations:    Reviewed TTE, discussed with family regarding KARISSA, risk and benefit and agreed to proceed. Plan to KARISSA tomorrow morning around 9. Stop TF post midnight. Abx, and other management per ICU team     [x]        High complexity decision making was performed      CC / Reason for consult: KARISSA    History of the presenting illness:  Breanna Barry is a 68 y.o. female with ARDS, severe sepsis, septic shock and enterococcal bacteremia. TTE was sub optimal and recommended KARISSA to eval for that. CT: Multilobar pneumonia, most severely involving both upper lobes, with some  additional right lower lobe involvement. Past Medical History:   Diagnosis Date    Arthritis     Asthma     Chronic obstructive pulmonary disease (HCC)     Chronic pain     Dyslipidemia     Hypertension     Ill-defined condition     Kidney stones    Kidney stone 2/25/2015    Long term current use of anticoagulant therapy     ASA  & Plavix    Lumbar disc disease     Lumbar spondylosis     Menopause     Migraine headache     Stroke [facility]) 10/2018    Thyroid disease        Prior to Admission medications    Medication Sig Start Date End Date Taking? Authorizing Provider   HYDROmorphone (DILAUDID) 2 MG tablet Take 1 tablet by mouth every 8 hours as needed for Pain Severe (7-10).  10/2/23   Modesta Montes De Oca MD   SYNTHROID 25 MCG tablet Take 1 tablet by mouth every morning (before breakfast) 10/13/23   Modesta Montes De Oca MD   Semaglutide,0.25 or 0.5MG/DOS, (OZEMPIC, 0.25 OR 0.5 MG/DOSE,) 2 MG/1.5ML SOPN Inject into the skin every 7 days    Modesta Montes De Oca MD   ondansetron (ZOFRAN-ODT) 4 MG disintegrating tablet Place 1

## 2023-11-06 NOTE — PROGRESS NOTES
11/06/23 0555   B: Both Spontaneous Awakening and Breathing Trials   Was Patient Receiving Mechanical Ventilation Yes   Safety Screening Spontaneous Breathing Trial (SBT) FIO2 is greater than 50%  (FiO2 = 60; PEEP = 11)

## 2023-11-06 NOTE — PROGRESS NOTES
[facility] PROGRESS NOTE. Please note that Dr Diana Shukla inadvertently copied this note as an addendum rather than copy-forward. This note was originally written by Dr Kwan Musa as his rounding note from . Name: Amisha Faith   : 1950   MRN: [id number]   Date: 2023      Chief Complaint   Patient presents with    Shortness of Breath     Pt arrives as transfer from [facility] on BIPAP. Pt was dxd with CHF exacerbation and bilateral upper lobe pneumonia with positive blood cultures. Per [facility] pt failed hi flow test at [facility]. Pt desatted to 84% on RA on arrival     Reason for ICU admission: Acute worsening respiratory failure    Hospital Course:    Adm to [facility] with acute hypoxic respiratory failure  23 - 73 F with PMHx of CAD, HTN, CKD III, HLD and hypothyroidism presented in [facility] ER with dyspnea on exertion and worsening generalized weakness/fatigue from PCP, with Spo2 79 %. CXR with bilateral infiltrates, BC drawn in ER at [facility] positive for enterococcus. She was transferred from [facility] to [address] [place] [address] for worsening oxygen needs from 4L nasal cannula to BiPAP within 24 hours. Transferred to ICU on BiPAP 23 night.  TTE: technically difficult. LVEF 60-65%. AoV poorly visualized. At least moderate AI  11/3/23 intubated emergently around olivia for worsening hypoxmemia. PF/< 100, ARDS protocol initiated, proned. Hypotensive on Levo, LR boluses x 2 followed by LR infusion. 23 - was on min sedation in AM, following commands but became very dyssynchronous with vent later in AM and CXR showed stable disease. Sedation increased and vent adjusted to optimize platue pressures   Gas exchange improving, CXR improving. Hemodynamically stable off vasopressors. Oliguric ISAAC stable. BC from  remain positive for enterococcus. Cocnern for BE. KARISSA requested    Subjective/ Objective:   No major events. RASS -1. Blaine.       Assessment & Plan     # Severe Acute Respiratory Distress Syndrome  -

## 2023-11-06 NOTE — PROGRESS NOTES
0720 Bedside and Verbal shift change report given to [address] (oncoming nurse) by Pinky Archer (offgoing nurse). Report included the following information Nurse Handoff Report, Intake/Output, MAR, Cardiac Rhythm Sinus tachycardia w/ PVC's, and Quality Measures. 18 Dr. Mil Barcenas at bedside, goal for today is awakening trial, refer to cardiology and possible KARISSA today or tomorrow    0745 Off propofol    0800 Assessment done. See flowsheet for details    8248 IDR done, keep rosenbaum/ CVC for today, change sedation to precedex. 1020 Patient oxygen saturation < 90 %, PEEP increased by intensivist to 10.     1100 Patient unable to maintain saturation above 90, Fio2 to 70 % RT aware. 1920 Bedside and Verbal shift change report given to Miya NORIEGA (oncoming nurse) by Marcos Banks RN (offgoing nurse). Report included the following information Nurse Handoff Report, Intake/Output, MAR, Cardiac Rhythm NSR/Sinus tachycardia w/ PVC's, and Quality Measures.

## 2023-11-06 NOTE — PROGRESS NOTES
Physician Progress Note      PATIENT:               Meño Henry  CSN #:                  [id number]  :                       1950  ADMIT DATE:       2023 4:51 PM  [facility] DATE:  Cristian Fernandez  PROVIDER #:        Morena Sandvoal MD          QUERY TEXT:    Good Morning    This patient admitted for acute hypoxic respiratory failure. The medical record also on 2023 \"CHF exacerbation\"    If possible, please document in progress notes and discharge summary further   specificity regarding the type and acuity of CHF:      The medical record reflects the following:  Risk Factors: HTN, Acute Resp. failure, CKD 3, CAD, Pneumonia,  Clinical Indicators: Echo Normal Left ventricular systolic function with ef of   60-65%. Mildy increased wall thickness. Normal wall motion. BNP on admission   14,818, 29,475, CT chest Margarita. Pleural effusions. Treatment: BNP eval, Echo, IV Bumex, ICU level care, Strict I&O, currently on   pressors, so HTN meds held    Thank you  Tevin Goyal, Azalea Lai, CPHQ, CCDS, SMART  Options provided:  -- Acute on Chronic Systolic CHF/HFrEF  -- Acute on Chronic Diastolic CHF/HFpEF  -- Acute on Chronic Systolic and Diastolic CHF  -- Acute Systolic CHF/HFrEF  -- Acute Diastolic CHF/HFpEF  -- Other - I will add my own diagnosis  -- Disagree - Not applicable / Not valid  -- Disagree - Clinically unable to determine / Unknown  -- Refer to Clinical Documentation Reviewer    PROVIDER RESPONSE TEXT:    This patient is in acute diastolic CHF/HFpEF.     Query created by: Tevin Goyal on 2023 8:55 AM      Electronically signed by:  Morena Sandoval MD 2023 4:02 PM

## 2023-11-06 NOTE — CARE COORDINATION
Transition of Care Plan:    RUR: 20%  Prior Level of Functioning: Independent  Disposition: To discuss with family  If SNF or IPR: Date FOC offered: N/A  Date FOC received: N/A  Accepting facility: N/A  Date authorization started with reference number: N/A  Date authorization received and expires: N/A  Follow up appointments: To be done prior to discharge. DME needed: None  Transportation at discharge: Family  IM/IMM Medicare/ letter given: To be given prior to discharge. Is patient a [place] and connected with VA? No   If yes, was [employer] transfer form completed and VA notified? /A  Caregiver Contact:   Discharge Caregiver contacted prior to discharge? Caregiver to be contacted prior to discharge. Care Conference needed? No  Barriers to discharge:  Medical stability      Patient continues to receive in ccu. Intubated. For [employer]. Margie Kenney RN BSN CRM        [phone]

## 2023-11-06 NOTE — DIABETES MGMT
[facility]  DIABETES MANAGEMENT CONSULT    Consulted by  Lizbeth Steele MD  for advanced nursing evaluation and care for inpatient blood glucose management. Evaluation and Action Plan   Jose Luis Baez is a 68year old female, with no history of diabetes, who is admitted with bacteremia, bilateral pneumonia and acute kidney injury. Initial labs were significant for creat 1.66, eGFR 33, Glucose 130, BNP 9462, Trop 176. She has been intubated, started on antibiotics, vasopressors and glucocorticoids. The Program for Diabetes Health has been consulted to assist in glycemic management and advanced diabetes management assessment this admission. Walter Chun does not have diabetes, A1C normal at 5.4% at admission along with mildly elevated glucose at 128. High dose methylprednisolone- 40mg Q6 started yesterday. Between vasopressor use, severe illness and steroid impact, glucose trended to over 356. Q4 correctional insulin has been started. She has benefited from low dose basal insulin to address hyperglycemia and suspect this is to be weaned off as she stabilizes and steroids weaned. Management Rationale Action Plan   Medication   Corrective insulin Using medium sensitivity  Q6h      Overriding steroid effect and CHO impact  NPH to offset steroid impact. Will advance to 10 units NPH Q12 with advancement in feeds. Please notify us if steroids change      Wean by 20% if glucose trending below 140mg/dl. Expect to wean this to off with steroids and clinical recovery. Additional orders  Wean steroids when able          Initial Presentation   Kristen Prince is a 68 y.o. female who presented to [facility] ED 11/1/23 with a c/o SOB and cough. LAB: creat 1.66, eGFR 33, Glucose 130, BNP 9462, Trop 176  CXR:  CTA CHEST W WO CONTRAST PE Eval   Final Result       1. No evidence of pulmonary embolism.    2. Multilobar pneumonia, most severely involving both upper lobes, with some   additional

## 2023-11-07 ENCOUNTER — APPOINTMENT (OUTPATIENT)
Facility: HOSPITAL | Age: 73
DRG: 853 | End: 2023-11-07
Attending: INTERNAL MEDICINE
Payer: MEDICARE

## 2023-11-07 ENCOUNTER — ANESTHESIA EVENT (OUTPATIENT)
Facility: HOSPITAL | Age: 73
DRG: 871 | End: 2023-11-07
Payer: MEDICARE

## 2023-11-07 ENCOUNTER — APPOINTMENT (OUTPATIENT)
Facility: HOSPITAL | Age: 73
DRG: 853 | End: 2023-11-07
Payer: MEDICARE

## 2023-11-07 PROBLEM — R55 VASOVAGAL SYNCOPE: Status: ACTIVE | Noted: 2023-11-07

## 2023-11-07 PROBLEM — I67.89 CEREBRAL MICROVASCULAR DISEASE: Status: ACTIVE | Noted: 2023-11-07

## 2023-11-07 PROBLEM — I65.23 BILATERAL CAROTID ARTERY STENOSIS: Status: ACTIVE | Noted: 2023-11-07

## 2023-11-07 LAB
ALBUMIN SERPL-MCNC: 2.7 G/DL (ref 3.5–5)
ALBUMIN SERPL-MCNC: 2.9 G/DL (ref 3.5–5)
ALBUMIN/GLOB SERPL: 1 (ref 1.1–2.2)
ALBUMIN/GLOB SERPL: 1 (ref 1.1–2.2)
ALP SERPL-CCNC: 58 U/L (ref 45–117)
ALP SERPL-CCNC: 60 U/L (ref 45–117)
ALT SERPL-CCNC: 106 U/L (ref 12–78)
ALT SERPL-CCNC: 93 U/L (ref 12–78)
ANION GAP SERPL CALC-SCNC: 6 MMOL/L (ref 5–15)
ANION GAP SERPL CALC-SCNC: 7 MMOL/L (ref 5–15)
APTT PPP: 23.1 SEC (ref 22.1–31)
AST SERPL-CCNC: 47 U/L (ref 15–37)
AST SERPL-CCNC: 54 U/L (ref 15–37)
BASOPHILS # BLD: 0 K/UL (ref 0–0.1)
BASOPHILS # BLD: 0 K/UL (ref 0–0.1)
BASOPHILS NFR BLD: 0 % (ref 0–1)
BASOPHILS NFR BLD: 0 % (ref 0–1)
BILIRUB SERPL-MCNC: 0.8 MG/DL (ref 0.2–1)
BILIRUB SERPL-MCNC: 1.2 MG/DL (ref 0.2–1)
BUN SERPL-MCNC: 74 MG/DL (ref 6–20)
BUN SERPL-MCNC: 80 MG/DL (ref 6–20)
BUN/CREAT SERPL: 34 (ref 12–20)
BUN/CREAT SERPL: 38 (ref 12–20)
CALCIUM SERPL-MCNC: 7.9 MG/DL (ref 8.5–10.1)
CALCIUM SERPL-MCNC: 8.1 MG/DL (ref 8.5–10.1)
CHLORIDE SERPL-SCNC: 110 MMOL/L (ref 97–108)
CHLORIDE SERPL-SCNC: 111 MMOL/L (ref 97–108)
CK SERPL-CCNC: 24 U/L (ref 26–192)
CO2 SERPL-SCNC: 25 MMOL/L (ref 21–32)
CO2 SERPL-SCNC: 26 MMOL/L (ref 21–32)
CREAT SERPL-MCNC: 2.11 MG/DL (ref 0.55–1.02)
CREAT SERPL-MCNC: 2.18 MG/DL (ref 0.55–1.02)
DIFFERENTIAL METHOD BLD: ABNORMAL
DIFFERENTIAL METHOD BLD: ABNORMAL
ECHO BSA: 1.84 M2
EOSINOPHIL # BLD: 0 K/UL (ref 0–0.4)
EOSINOPHIL # BLD: 0 K/UL (ref 0–0.4)
EOSINOPHIL NFR BLD: 0 % (ref 0–7)
EOSINOPHIL NFR BLD: 0 % (ref 0–7)
ERYTHROCYTE [DISTWIDTH] IN BLOOD BY AUTOMATED COUNT: 15.9 % (ref 11.5–14.5)
ERYTHROCYTE [DISTWIDTH] IN BLOOD BY AUTOMATED COUNT: 16 % (ref 11.5–14.5)
ERYTHROCYTE [DISTWIDTH] IN BLOOD BY AUTOMATED COUNT: 16.1 % (ref 11.5–14.5)
EST. AVERAGE GLUCOSE BLD GHB EST-MCNC: 105 MG/DL
GLOBULIN SER CALC-MCNC: 2.8 G/DL (ref 2–4)
GLOBULIN SER CALC-MCNC: 2.8 G/DL (ref 2–4)
GLUCOSE BLD STRIP.AUTO-MCNC: 167 MG/DL (ref 65–117)
GLUCOSE BLD STRIP.AUTO-MCNC: 175 MG/DL (ref 65–117)
GLUCOSE BLD STRIP.AUTO-MCNC: 178 MG/DL (ref 65–117)
GLUCOSE SERPL-MCNC: 172 MG/DL (ref 65–100)
GLUCOSE SERPL-MCNC: 173 MG/DL (ref 65–100)
HBA1C MFR BLD: 5.3 % (ref 4–5.6)
HCT VFR BLD AUTO: 22.8 % (ref 35–47)
HCT VFR BLD AUTO: 26.5 % (ref 35–47)
HCT VFR BLD AUTO: 29.8 % (ref 35–47)
HGB BLD-MCNC: 7.3 G/DL (ref 11.5–16)
HGB BLD-MCNC: 8.3 G/DL (ref 11.5–16)
HGB BLD-MCNC: 9.7 G/DL (ref 11.5–16)
HISTORY CHECK: NORMAL
IMM GRANULOCYTES # BLD AUTO: 0 K/UL (ref 0–0.04)
IMM GRANULOCYTES # BLD AUTO: 0 K/UL (ref 0–0.04)
IMM GRANULOCYTES NFR BLD AUTO: 0 % (ref 0–0.5)
IMM GRANULOCYTES NFR BLD AUTO: 0 % (ref 0–0.5)
INR PPP: 1.3 (ref 0.9–1.1)
LYMPHOCYTES # BLD: 0.9 K/UL (ref 0.8–3.5)
LYMPHOCYTES # BLD: 1.2 K/UL (ref 0.8–3.5)
LYMPHOCYTES NFR BLD: 7 % (ref 12–49)
LYMPHOCYTES NFR BLD: 8 % (ref 12–49)
MAGNESIUM SERPL-MCNC: 2.8 MG/DL (ref 1.6–2.4)
MAGNESIUM SERPL-MCNC: 2.9 MG/DL (ref 1.6–2.4)
MCH RBC QN AUTO: 28.9 PG (ref 26–34)
MCH RBC QN AUTO: 28.9 PG (ref 26–34)
MCH RBC QN AUTO: 29.6 PG (ref 26–34)
MCHC RBC AUTO-ENTMCNC: 31.3 G/DL (ref 30–36.5)
MCHC RBC AUTO-ENTMCNC: 32 G/DL (ref 30–36.5)
MCHC RBC AUTO-ENTMCNC: 32.6 G/DL (ref 30–36.5)
MCV RBC AUTO: 90.1 FL (ref 80–99)
MCV RBC AUTO: 90.9 FL (ref 80–99)
MCV RBC AUTO: 92.3 FL (ref 80–99)
METAMYELOCYTES NFR BLD MANUAL: 3 %
MONOCYTES # BLD: 0.2 K/UL (ref 0–1)
MONOCYTES # BLD: 0.9 K/UL (ref 0–1)
MONOCYTES NFR BLD: 2 % (ref 5–13)
MONOCYTES NFR BLD: 5 % (ref 5–13)
MYELOCYTES NFR BLD MANUAL: 2 %
NEUTS BAND NFR BLD MANUAL: 1 %
NEUTS BAND NFR BLD MANUAL: 3 %
NEUTS SEG # BLD: 14.6 K/UL (ref 1.8–8)
NEUTS SEG # BLD: 9.8 K/UL (ref 1.8–8)
NEUTS SEG NFR BLD: 82 % (ref 32–75)
NEUTS SEG NFR BLD: 87 % (ref 32–75)
NRBC # BLD: 0.11 K/UL (ref 0–0.01)
NRBC # BLD: 0.21 K/UL (ref 0–0.01)
NRBC # BLD: 0.31 K/UL (ref 0–0.01)
NRBC BLD-RTO: 1 PER 100 WBC
NRBC BLD-RTO: 1.5 PER 100 WBC
NRBC BLD-RTO: 1.8 PER 100 WBC
PHOSPHATE SERPL-MCNC: 5 MG/DL (ref 2.6–4.7)
PLATELET # BLD AUTO: 192 K/UL (ref 150–400)
PLATELET # BLD AUTO: 221 K/UL (ref 150–400)
PLATELET # BLD AUTO: 299 K/UL (ref 150–400)
PMV BLD AUTO: 10.3 FL (ref 8.9–12.9)
PMV BLD AUTO: 10.5 FL (ref 8.9–12.9)
PMV BLD AUTO: 10.6 FL (ref 8.9–12.9)
POTASSIUM SERPL-SCNC: 5.3 MMOL/L (ref 3.5–5.1)
POTASSIUM SERPL-SCNC: 5.3 MMOL/L (ref 3.5–5.1)
PROT SERPL-MCNC: 5.5 G/DL (ref 6.4–8.2)
PROT SERPL-MCNC: 5.7 G/DL (ref 6.4–8.2)
PROTHROMBIN TIME: 13.5 SEC (ref 9–11.1)
RBC # BLD AUTO: 2.53 M/UL (ref 3.8–5.2)
RBC # BLD AUTO: 2.87 M/UL (ref 3.8–5.2)
RBC # BLD AUTO: 3.28 M/UL (ref 3.8–5.2)
RBC MORPH BLD: ABNORMAL
RBC MORPH BLD: ABNORMAL
SERVICE CMNT-IMP: ABNORMAL
SODIUM SERPL-SCNC: 142 MMOL/L (ref 136–145)
SODIUM SERPL-SCNC: 143 MMOL/L (ref 136–145)
THERAPEUTIC RANGE: NORMAL SECS (ref 58–77)
TSH SERPL DL<=0.05 MIU/L-ACNC: 2.39 UIU/ML (ref 0.36–3.74)
VANCOMYCIN SERPL-MCNC: 18.1 UG/ML
VAS LEFT CCA DIST EDV: 10.3 CM/S
VAS LEFT CCA DIST PSV: 76.8 CM/S
VAS LEFT CCA PROX EDV: 0 CM/S
VAS LEFT CCA PROX PSV: 75.5 CM/S
VAS LEFT ECA EDV: 6.4 CM/S
VAS LEFT ECA PSV: 79.4 CM/S
VAS LEFT ICA DIST EDV: 6.2 CM/S
VAS LEFT ICA DIST PSV: 66.8 CM/S
VAS LEFT ICA MID EDV: 5.1 CM/S
VAS LEFT ICA MID PSV: 59.9 CM/S
VAS LEFT ICA PROX EDV: 10.3 CM/S
VAS LEFT ICA PROX PSV: 69 CM/S
VAS LEFT ICA/CCA PSV: 0.9 NO UNITS
VAS LEFT VERTEBRAL EDV: 0 CM/S
VAS LEFT VERTEBRAL PSV: 36.4 CM/S
VAS RIGHT CCA DIST EDV: 0 CM/S
VAS RIGHT CCA DIST PSV: 65.1 CM/S
VAS RIGHT ECA EDV: 7.7 CM/S
VAS RIGHT ECA PSV: 66.4 CM/S
VAS RIGHT ICA MID EDV: 7.7 CM/S
VAS RIGHT ICA MID PSV: 65.1 CM/S
VAS RIGHT ICA PROX EDV: 10.3 CM/S
VAS RIGHT ICA PROX PSV: 76.8 CM/S
VAS RIGHT ICA/CCA PSV: 1.2 NO UNITS
WBC # BLD AUTO: 10.9 K/UL (ref 3.6–11)
WBC # BLD AUTO: 13.8 K/UL (ref 3.6–11)
WBC # BLD AUTO: 17.6 K/UL (ref 3.6–11)

## 2023-11-07 PROCEDURE — 6370000000 HC RX 637 (ALT 250 FOR IP): Performed by: INTERNAL MEDICINE

## 2023-11-07 PROCEDURE — 99223 1ST HOSP IP/OBS HIGH 75: CPT | Performed by: INTERNAL MEDICINE

## 2023-11-07 PROCEDURE — 86900 BLOOD TYPING SEROLOGIC ABO: CPT

## 2023-11-07 PROCEDURE — 6360000002 HC RX W HCPCS

## 2023-11-07 PROCEDURE — 2100000001 HC CVICU R&B

## 2023-11-07 PROCEDURE — 86923 COMPATIBILITY TEST ELECTRIC: CPT

## 2023-11-07 PROCEDURE — A4216 STERILE WATER/SALINE, 10 ML: HCPCS | Performed by: INTERNAL MEDICINE

## 2023-11-07 PROCEDURE — 80202 ASSAY OF VANCOMYCIN: CPT

## 2023-11-07 PROCEDURE — 71045 X-RAY EXAM CHEST 1 VIEW: CPT

## 2023-11-07 PROCEDURE — 84100 ASSAY OF PHOSPHORUS: CPT

## 2023-11-07 PROCEDURE — 6360000002 HC RX W HCPCS: Performed by: STUDENT IN AN ORGANIZED HEALTH CARE EDUCATION/TRAINING PROGRAM

## 2023-11-07 PROCEDURE — 94640 AIRWAY INHALATION TREATMENT: CPT

## 2023-11-07 PROCEDURE — P9040 RBC LEUKOREDUCED IRRADIATED: HCPCS

## 2023-11-07 PROCEDURE — 82550 ASSAY OF CK (CPK): CPT

## 2023-11-07 PROCEDURE — 80053 COMPREHEN METABOLIC PANEL: CPT

## 2023-11-07 PROCEDURE — 6360000002 HC RX W HCPCS: Performed by: HOSPITALIST

## 2023-11-07 PROCEDURE — 2500000003 HC RX 250 WO HCPCS: Performed by: INTERNAL MEDICINE

## 2023-11-07 PROCEDURE — 36430 TRANSFUSION BLD/BLD COMPNT: CPT

## 2023-11-07 PROCEDURE — 94003 VENT MGMT INPAT SUBQ DAY: CPT

## 2023-11-07 PROCEDURE — C9113 INJ PANTOPRAZOLE SODIUM, VIA: HCPCS | Performed by: INTERNAL MEDICINE

## 2023-11-07 PROCEDURE — 83735 ASSAY OF MAGNESIUM: CPT

## 2023-11-07 PROCEDURE — 31500 INSERT EMERGENCY AIRWAY: CPT

## 2023-11-07 PROCEDURE — 2580000003 HC RX 258: Performed by: NURSE PRACTITIONER

## 2023-11-07 PROCEDURE — 85025 COMPLETE CBC W/AUTO DIFF WBC: CPT

## 2023-11-07 PROCEDURE — 85610 PROTHROMBIN TIME: CPT

## 2023-11-07 PROCEDURE — 6370000000 HC RX 637 (ALT 250 FOR IP): Performed by: HOSPITALIST

## 2023-11-07 PROCEDURE — 87040 BLOOD CULTURE FOR BACTERIA: CPT

## 2023-11-07 PROCEDURE — 85027 COMPLETE CBC AUTOMATED: CPT

## 2023-11-07 PROCEDURE — 93880 EXTRACRANIAL BILAT STUDY: CPT

## 2023-11-07 PROCEDURE — 36415 COLL VENOUS BLD VENIPUNCTURE: CPT

## 2023-11-07 PROCEDURE — 85730 THROMBOPLASTIN TIME PARTIAL: CPT

## 2023-11-07 PROCEDURE — 2580000003 HC RX 258: Performed by: INTERNAL MEDICINE

## 2023-11-07 PROCEDURE — 86901 BLOOD TYPING SEROLOGIC RH(D): CPT

## 2023-11-07 PROCEDURE — 93312 ECHO TRANSESOPHAGEAL: CPT

## 2023-11-07 PROCEDURE — 84443 ASSAY THYROID STIM HORMONE: CPT

## 2023-11-07 PROCEDURE — 2580000003 HC RX 258: Performed by: HOSPITALIST

## 2023-11-07 PROCEDURE — 82962 GLUCOSE BLOOD TEST: CPT

## 2023-11-07 PROCEDURE — P9016 RBC LEUKOCYTES REDUCED: HCPCS

## 2023-11-07 PROCEDURE — 86850 RBC ANTIBODY SCREEN: CPT

## 2023-11-07 PROCEDURE — 6370000000 HC RX 637 (ALT 250 FOR IP): Performed by: NURSE PRACTITIONER

## 2023-11-07 PROCEDURE — 6360000002 HC RX W HCPCS: Performed by: NURSE PRACTITIONER

## 2023-11-07 PROCEDURE — 93880 EXTRACRANIAL BILAT STUDY: CPT | Performed by: PSYCHIATRY & NEUROLOGY

## 2023-11-07 PROCEDURE — 6360000002 HC RX W HCPCS: Performed by: INTERNAL MEDICINE

## 2023-11-07 PROCEDURE — 99223 1ST HOSP IP/OBS HIGH 75: CPT | Performed by: NURSE PRACTITIONER

## 2023-11-07 PROCEDURE — 2000000000 HC ICU R&B

## 2023-11-07 PROCEDURE — 83036 HEMOGLOBIN GLYCOSYLATED A1C: CPT

## 2023-11-07 RX ORDER — MIDAZOLAM HYDROCHLORIDE 1 MG/ML
INJECTION INTRAMUSCULAR; INTRAVENOUS
Status: COMPLETED
Start: 2023-11-07 | End: 2023-11-07

## 2023-11-07 RX ORDER — SODIUM CHLORIDE 0.9 % (FLUSH) 0.9 %
5-40 SYRINGE (ML) INJECTION PRN
Status: DISCONTINUED | OUTPATIENT
Start: 2023-11-07 | End: 2023-11-09 | Stop reason: HOSPADM

## 2023-11-07 RX ORDER — FUROSEMIDE 10 MG/ML
40 INJECTION INTRAMUSCULAR; INTRAVENOUS ONCE
Status: COMPLETED | OUTPATIENT
Start: 2023-11-07 | End: 2023-11-07

## 2023-11-07 RX ORDER — POLYETHYLENE GLYCOL 3350 17 G/17G
17 POWDER, FOR SOLUTION ORAL DAILY
Status: DISCONTINUED | OUTPATIENT
Start: 2023-11-07 | End: 2023-11-09 | Stop reason: HOSPADM

## 2023-11-07 RX ORDER — SODIUM CHLORIDE 9 MG/ML
INJECTION, SOLUTION INTRAVENOUS PRN
Status: COMPLETED | OUTPATIENT
Start: 2023-11-07 | End: 2023-11-08

## 2023-11-07 RX ORDER — METOPROLOL TARTRATE 5 MG/5ML
2.5 INJECTION INTRAVENOUS ONCE
Status: COMPLETED | OUTPATIENT
Start: 2023-11-07 | End: 2023-11-07

## 2023-11-07 RX ORDER — HYDRALAZINE HYDROCHLORIDE 20 MG/ML
5 INJECTION INTRAMUSCULAR; INTRAVENOUS EVERY 4 HOURS PRN
Status: DISCONTINUED | OUTPATIENT
Start: 2023-11-07 | End: 2023-11-09 | Stop reason: HOSPADM

## 2023-11-07 RX ORDER — MIDAZOLAM HYDROCHLORIDE 1 MG/ML
2 INJECTION INTRAMUSCULAR; INTRAVENOUS ONCE
Status: COMPLETED | OUTPATIENT
Start: 2023-11-07 | End: 2023-11-07

## 2023-11-07 RX ORDER — BISACODYL 10 MG
10 SUPPOSITORY, RECTAL RECTAL DAILY PRN
Status: DISCONTINUED | OUTPATIENT
Start: 2023-11-07 | End: 2023-11-09 | Stop reason: HOSPADM

## 2023-11-07 RX ORDER — SODIUM CHLORIDE 0.9 % (FLUSH) 0.9 %
5-40 SYRINGE (ML) INJECTION EVERY 8 HOURS
Status: DISCONTINUED | OUTPATIENT
Start: 2023-11-07 | End: 2023-11-09 | Stop reason: HOSPADM

## 2023-11-07 RX ORDER — LEVOTHYROXINE SODIUM 0.05 MG/1
25 TABLET ORAL DAILY
Status: DISCONTINUED | OUTPATIENT
Start: 2023-11-07 | End: 2023-11-09 | Stop reason: HOSPADM

## 2023-11-07 RX ORDER — SENNA AND DOCUSATE SODIUM 50; 8.6 MG/1; MG/1
1 TABLET, FILM COATED ORAL 2 TIMES DAILY
Status: DISCONTINUED | OUTPATIENT
Start: 2023-11-07 | End: 2023-11-09 | Stop reason: HOSPADM

## 2023-11-07 RX ADMIN — Medication 50 MCG/HR: at 05:44

## 2023-11-07 RX ADMIN — DEXMEDETOMIDINE 0.6 MCG/KG/HR: 100 INJECTION, SOLUTION INTRAVENOUS at 16:06

## 2023-11-07 RX ADMIN — IPRATROPIUM BROMIDE AND ALBUTEROL SULFATE 1 DOSE: 2.5; .5 SOLUTION RESPIRATORY (INHALATION) at 19:04

## 2023-11-07 RX ADMIN — METOPROLOL TARTRATE 2.5 MG: 5 INJECTION INTRAVENOUS at 07:42

## 2023-11-07 RX ADMIN — SODIUM CHLORIDE, PRESERVATIVE FREE 10 ML: 5 INJECTION INTRAVENOUS at 08:31

## 2023-11-07 RX ADMIN — MIDAZOLAM 2 MG: 1 INJECTION INTRAMUSCULAR; INTRAVENOUS at 11:56

## 2023-11-07 RX ADMIN — SODIUM CHLORIDE 40 MG: 9 INJECTION INTRAMUSCULAR; INTRAVENOUS; SUBCUTANEOUS at 20:26

## 2023-11-07 RX ADMIN — DAPTOMYCIN 750 MG: 500 INJECTION, POWDER, LYOPHILIZED, FOR SOLUTION INTRAVENOUS at 14:52

## 2023-11-07 RX ADMIN — PHENYLEPHRINE HYDROCHLORIDE 30 MCG/MIN: 10 INJECTION INTRAVENOUS at 12:15

## 2023-11-07 RX ADMIN — VANCOMYCIN HYDROCHLORIDE 750 MG: 750 INJECTION, POWDER, LYOPHILIZED, FOR SOLUTION INTRAVENOUS at 07:53

## 2023-11-07 RX ADMIN — GENTAMICIN SULFATE 55 MG: 40 INJECTION, SOLUTION INTRAMUSCULAR; INTRAVENOUS at 16:08

## 2023-11-07 RX ADMIN — IPRATROPIUM BROMIDE AND ALBUTEROL SULFATE 1 DOSE: 2.5; .5 SOLUTION RESPIRATORY (INHALATION) at 14:11

## 2023-11-07 RX ADMIN — MUPIROCIN: 20 OINTMENT TOPICAL at 21:45

## 2023-11-07 RX ADMIN — Medication: at 19:59

## 2023-11-07 RX ADMIN — MUPIROCIN: 20 OINTMENT TOPICAL at 16:09

## 2023-11-07 RX ADMIN — METHYLPREDNISOLONE SODIUM SUCCINATE 40 MG: 40 INJECTION, POWDER, FOR SOLUTION INTRAMUSCULAR; INTRAVENOUS at 05:39

## 2023-11-07 RX ADMIN — SODIUM CHLORIDE, PRESERVATIVE FREE 10 ML: 5 INJECTION INTRAVENOUS at 13:00

## 2023-11-07 RX ADMIN — Medication 1 AMPULE: at 08:30

## 2023-11-07 RX ADMIN — METOPROLOL TARTRATE 25 MG: 25 TABLET, FILM COATED ORAL at 10:08

## 2023-11-07 RX ADMIN — MIDAZOLAM 2 MG: 1 INJECTION INTRAMUSCULAR; INTRAVENOUS at 11:50

## 2023-11-07 RX ADMIN — CHLORHEXIDINE GLUCONATE 15 ML: 1.2 RINSE ORAL at 19:58

## 2023-11-07 RX ADMIN — IPRATROPIUM BROMIDE AND ALBUTEROL SULFATE 1 DOSE: 2.5; .5 SOLUTION RESPIRATORY (INHALATION) at 01:54

## 2023-11-07 RX ADMIN — SENNOSIDES AND DOCUSATE SODIUM 1 TABLET: 50; 8.6 TABLET ORAL at 08:25

## 2023-11-07 RX ADMIN — FUROSEMIDE 40 MG: 10 INJECTION, SOLUTION INTRAMUSCULAR; INTRAVENOUS at 11:25

## 2023-11-07 RX ADMIN — CHLORHEXIDINE GLUCONATE 15 ML: 1.2 RINSE ORAL at 07:59

## 2023-11-07 RX ADMIN — Medication: at 10:16

## 2023-11-07 RX ADMIN — Medication 1 AMPULE: at 19:59

## 2023-11-07 RX ADMIN — POLYETHYLENE GLYCOL 3350 17 G: 17 POWDER, FOR SOLUTION ORAL at 10:08

## 2023-11-07 RX ADMIN — SENNOSIDES AND DOCUSATE SODIUM 1 TABLET: 50; 8.6 TABLET ORAL at 21:47

## 2023-11-07 RX ADMIN — SODIUM CHLORIDE, PRESERVATIVE FREE 10 ML: 5 INJECTION INTRAVENOUS at 20:01

## 2023-11-07 RX ADMIN — Medication 50 MCG/HR: at 18:51

## 2023-11-07 RX ADMIN — ENOXAPARIN SODIUM 30 MG: 100 INJECTION SUBCUTANEOUS at 08:30

## 2023-11-07 RX ADMIN — IPRATROPIUM BROMIDE AND ALBUTEROL SULFATE 1 DOSE: 2.5; .5 SOLUTION RESPIRATORY (INHALATION) at 07:35

## 2023-11-07 RX ADMIN — LEVOTHYROXINE SODIUM 25 MCG: 0.05 TABLET ORAL at 08:04

## 2023-11-07 RX ADMIN — FUROSEMIDE 40 MG: 10 INJECTION, SOLUTION INTRAMUSCULAR; INTRAVENOUS at 05:40

## 2023-11-07 ASSESSMENT — PAIN SCALES - GENERAL
PAINLEVEL_OUTOF10: 0

## 2023-11-07 ASSESSMENT — PULMONARY FUNCTION TESTS
PIF_VALUE: 27
PIF_VALUE: 23
PIF_VALUE: 28
PIF_VALUE: 25
PIF_VALUE: 24
PIF_VALUE: 35

## 2023-11-07 NOTE — PROGRESS NOTES
0730  Bedside and Verbal shift change report given to Chelsea Soria, RN and [employer], RN (oncoming nurse) by Alexis RN (offgoing nurse). Report included the following information Intake/Output, MAR, Recent Results, and Cardiac Rhythm NSR/ST .     4686  Noted that BP in right arm with manual cuff and doppler systolic was 717 and noted that on art line SBP was 167-170  Discussed with Dr. Amrik Khan at bedside. Noted that wave form was peaked on monitor. Then noted that first finger on left had was purple and then noticed that left hand was cooler with cap refill > 3 seconds. Art line removed immediately. Positive cap refill in all digits, but greater than 3 seconds. [phone]   Cardiology at bedside for KARISSA.    1150  Versed IV per Dr. Cherre Skiff for KARISSA    1155 Versed 2 mg IV per Dr. Cherre Skiff at bedside. 1230  Assessment as per flow. Boyd started due to low BP after getting Versed. 1330  CT surgery consulted. .  1600  VSS. No change in assessment. Weaning Boyd. 1830  Boyd off and VSS. Type and screen/CBC sent and pending. Per Dr. Massiel Nguyen, TF's will stay on hold at this time. 1915  Bedside report to LEANN Casas.

## 2023-11-07 NOTE — SIGNIFICANT EVENT
KARISSA reveals vegetations on both AoV and MV with significant valvular dysfunction.  Have requested CSS and ID consults and spoken with Dr Mayur Bailey and MD [address]  [facility] 4th floor [facility] phone: [phone]  [facility] 7th floor [facility] phone: [phone]  [address] [place] [address] phone: [phone]  11/7/2023

## 2023-11-07 NOTE — CONSENT
Informed Consent for Blood Component Transfusion Note    I have discussed with the  the rationale for blood component transfusion; its benefits in treating or preventing fatigue, organ damage, or death; and its risk which includes mild transfusion reactions, rare risk of blood borne infection, or more serious but rare reactions. I have discussed the alternatives to transfusion, including the risk and consequences of not receiving transfusion. The  had an opportunity to ask questions and had agreed to proceed with transfusion of blood components.     Electronically signed by Andrea Mcgovern MD on 11/7/23 at 12:41 PM EST

## 2023-11-07 NOTE — PROGRESS NOTES
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 '

## 2023-11-07 NOTE — INTERDISCIPLINARY ROUNDS
Critical care interdisciplinary rounds today. Following members present: Case Management, , Clinical Lead, Diabetes Team, Nursing, Nutrition, Pharmacy, and Physician. Family invited to participate. Plan of care discussed. See clinical pathway for plan of care and interventions and desired outcomes. CVC and rosenbaum in place. Keep CVC for IV access. Keep rosenbaum for Strict I/O's in setting of ISAAC (Cr of 2.18) and due to diuretic use per Dr. Diana Shukla.

## 2023-11-07 NOTE — PROGRESS NOTES
Patient in CCU for in patient KARISSA Procedure. Staff introduced to patient. Patient identifiers verified with Name and Date of Birth. Procedure verified with patient. Consent forms reviewed and signed by patient or authorized representative and verified. Allergies verified. Patient informed of procedure and plan of care. Questions answered with review. Patient on cardiac monitor, non-invasive blood pressure, SPO2 monitor. On ventilator. Patient is A&Ox0. Patient reports no complaints. Patient on stretcher, in low position, with side rails up. Patient instructed to call for assistance as needed.

## 2023-11-07 NOTE — CONSULTS
CSS   History and Physical/Consult  Referring cardiologist: Dr. Mead Dates  CC: progressive symptoms of SOB, cough, fatigue, generalized weakness  Diagnoses: sepsis, bacterial endocarditis, acute respiratory failure, multifocal pneumonia, acute pulmonary edema, CKD, chronic heart failure with preserved ejection fraction, anemia, dyslipidemia, hypothyroidism, drug induced pneumonitis  Subjective:      Clif Corral is a 68 y.o. 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 Her history includes: hypothyroidism, COPD, HTN, HLD, kidney stone, HFpEF, cardiac catheterization in 2011 which showed only minor luminal irregularities in the proximal LAD. The other vessels were free of disease. Stress test last year negative. .  She presented to OSH on 11-1-23 after 5 days of worsening SOB. Her generalized weakness started on 10/16. She had a UTI and was started on abx 10/23. She developed dyspnea, worsening fatigue and weakness. She was admitted to [facility] and was found to be septic with positive blood cultures, acute kidney injury, multifocal pneumonia, hypoxia, anemia, pulmonary edema. She was treated with IV antibiotics (cefepime and vancomycin), steroids, Bi PAP, oxygen, nebulizations, diuresis. CTA was negative for PE. She was transferred here for a higher level of care. She was emergently intubated 11-3-23 at 4 am. A central line and arterial line were inserted. Today a KARISSA was performed which showed the AI and MR with vegetations. Today's echo revealed the vegetations on the aortic and mitral valves. Komal Kunz was consulted for urgent cardiac surgery: AVR and MVR. He discussed the situation in detail with the patient's  and son. Cardiac Testing    Cardiac catheterization: n/a     ECHO: report pending.   Dr. Carbajal Never, cardiologist and intensivist present and

## 2023-11-07 NOTE — PROGRESS NOTES
1900: Bedside shift change report given to LEANN Casas (oncoming nurse) by Nessa Oneill RN (offgoing nurse). Report included the following information Nurse Handoff Report, Intake/Output, MAR, Recent Results, and Cardiac Rhythm NSR .     2000: Assessment completed. Discussed MAP goal with NP. Levophed restarted to maintain MAP >65. Vent changes made by FREDRICK [facility] 24/320/12/50. See MAR and flowsheet for more details. 0000: Reassessment completed. 0215: NPH held per FREDRICK Avalos. 0230: TF stopped for KARISSA in AM.     0400: Reassessment completed. 0700: Bedside shift change report given to Masood Saucedo (oncoming nurse) by LEANN Casas (offgoing nurse). Report included the following information Nurse Handoff Report, Intake/Output, MAR, Recent Results, and Cardiac Rhythm NSR .

## 2023-11-07 NOTE — PROGRESS NOTES
11/07/23 0340   B: Both Spontaneous Awakening and Breathing Trials   Safety Screening Spontaneous Breathing Trial (SBT) PEEP is greater than 7.5 cmH2O  (PEEP = 12)

## 2023-11-07 NOTE — PROGRESS NOTES
[facility] PROGRESS NOTE. Name: Justin Bishop   : 1950   MRN: [id number]   Date: 2023      Chief Complaint   Patient presents with    Shortness of Breath     Pt arrives as transfer from [facility] on BIPAP. Pt was dxd with CHF exacerbation and bilateral upper lobe pneumonia with positive blood cultures. Per [facility] pt failed hi flow test at [facility]. Pt desatted to 84% on RA on arrival     Reason for ICU admission: Acute worsening respiratory failure    Hospital Course:    Adm to [facility] with acute hypoxic respiratory failure   68 F with PMHx of CAD, HTN, CKD III, HLD and hypothyroidism presented in [facility] ER with dyspnea on exertion and worsening generalized weakness/fatigue from PCP, with Spo2 79 %. CXR with bilateral infiltrates, BC drawn in ER at [facility] positive for enterococcus. She was transferred from [facility] to [facility] for worsening oxygen needs from 4L nasal cannula to BiPAP within 24 hours. Transferred to ICU on BiPAP 23 night.  TTE: technically difficult. LVEF 60-65%. AoV poorly visualized. At least moderate AI   intubated emergently around olivia for worsening hypoxmemia. PF/< 100, ARDS protocol initiated, proned. Hypotensive on Levo, LR boluses x 2 followed by LR infusion.  - was on min sedation in AM, following commands but became very dyssynchronous with vent later in AM and CXR showed stable disease. Sedation increased and vent adjusted to optimize platue pressures   Gas exchange improving, CXR improving. Hemodynamically stable off vasopressors. Oliguric ISAAC stable. BC from  remain positive for enterococcus. Cocnern for BE. KARISSA requested   Cognition improving. Remains on PEEP 10, FiO2 50%. KARISSA planned for this morning    Subjective/ Objective:   No major events. RASS -1. + F/C. [place].       Assessment & Plan     # Acute hypoxic respiratory failure  # ARDS pattern on CXR  - Ventilator settings reviewed with RT and adjusted as indicated  - Lung Chronic obstructive pulmonary disease (HCC), Chronic pain, Dyslipidemia, Hypertension, Ill-defined condition, Kidney stone, Long term current use of anticoagulant therapy, Lumbar disc disease, Lumbar spondylosis, Menopause, Migraine headache, Stroke ([address]), and Thyroid disease. Past Surgical History:      has a past surgical history that includes gyn; Hysterectomy; Ovary removal; Appendectomy (4606); cervical laminectomy (1989, 2005); Cardiac catheterization (2011); orthopedic surgery (2013, 2014); orthopedic surgery (1987); and orthopedic surgery (1984). Home Medications:     Prior to Admission medications    Medication Sig Start Date End Date Taking? Authorizing Provider   HYDROmorphone (DILAUDID) 2 MG tablet Take 1 tablet by mouth every 8 hours as needed for Pain Severe (7-10).  10/2/23   Modesta Montes De Oca MD   SYNTHROID 25 MCG tablet Take 1 tablet by mouth every morning (before breakfast) 10/13/23   Modesta Montes De Oca MD   Semaglutide,0.25 or 0.5MG/DOS, (OZEMPIC, 0.25 OR 0.5 MG/DOSE,) 2 MG/1.5ML SOPN Inject into the skin every 7 days    ProviderModesta MD   ondansetron (ZOFRAN-ODT) 4 MG disintegrating tablet Place 1 tablet under the tongue every 8 hours as needed for Nausea or Vomiting 10/2/23   George Blanton DO   amLODIPine (NORVASC) 2.5 MG tablet TAKE 1 TABLET TWICE A DAY 8/17/23   CHATO Novak - NP   albuterol (PROVENTIL) (2.5 MG/3ML) 0.083% nebulizer solution Inhale 3 mLs into the lungs every 4 hours as needed for Shortness of Breath    Automatic Reconciliation, Ar   aspirin 81 MG EC tablet Take 1 tablet by mouth daily 11/9/18   Automatic Reconciliation, Ar   Budeson-Glycopyrrol-Formoterol (BREZTRI AEROSPHERE) 160-9-4.8 MCG/ACT AERO Inhale 2 puffs into the lungs 2 times daily 6/21/22   Automatic Reconciliation, Ar   cyclobenzaprine (FLEXERIL) 10 MG tablet Take 1 tablet by mouth 3 times daily as needed    Automatic Reconciliation, Ar   diclofenac sodium (VOLTAREN) 1 % GEL as

## 2023-11-07 NOTE — CONSULTS
Infectious Disease Consult    Date of Consultation:  November 7, 2023  Reason for Consultation: Enterococcus endocarditis. PCN allergy (?anaphylaxis  Referring Physician: Dr Enedina Ivory  Date of Admission:11/2/2023      Impression    Severe sepsis  Shock septic & cardiogenic       E faecalis bacteremia, persistent  Blood cultures 11/1+ for E faecalis 4/4  Repeat cultures 11/3- 2/4 & 11/5- 2/4  Cultures from 11/7-pending      Aortic & mitral valve endocarditis  With appearance of valvular perforation on KARISSA  Severe AR and MR. Plan is for AVR/MVR with tissue valves in a.m. Acute hypoxic respiratory failure  ARDS pattern on imaging    ISAAC on CKD 3  Cr 2.11    Steroid-induced hyperglycemia  On SSI    H/o worsening low back pain, recent onset  H/o L/ knee pain, 2ry to meniscal injury  Past h/o laminectomy, cervical fusion    H/o fracture R/wrist  S/p ORIF(hardware present)    H/o R/shoulder surgery    PCN allergy with h/o anaphylaxis    Plan  Change to Daptomycin and Gentamicin IV  Pharmacy to adjust to creatinine clearance please  Unfortunately we cannot use Ampicillin/Ceftriaxone combination  Repeat blood cultures x 2 until negative cultures are obtained  Patient would require imaging of lower lumbar spine at some point  given recent onset of worsening low back pain  ID service to follow. Extensive review of chart notes, labs, imaging, cultures done  Additionally review of done: Recent reports-Labs, cultures, imaging  D/w -Dr Kim Jacques is a 67 y/o female with PMHx  significant for  CAD, HTN, CKD III, HLD and hypothyroidism transferred from [facility] for management of acute hypoxic respiratory failure in the setting of bilateral apical pneumonia. She was admitted at [facility] on 11/1 after presenting with dyspnea on exertion and worsening generalized weakness/fatigue from PCP, with Spo2 79 %.  CXR with bilateral apical PNA, BC positive for GPCs  Ceftriaxone/azithromycin broadened to cefepime/ administration of nonionic contrast 100 mL of isovue 370 according to departmental PE protocol. Coronal and sagittal reformats were performed. 3D post processing was performed. CT dose reduction was achieved through the use of a standardized protocol tailored for this examination and automatic exposure control for dose modulation. FINDINGS: This is a good quality study for the evaluation of pulmonary embolism to the first subsegmental arterial level. There is no pulmonary embolism to this level. THYROID: No nodule. MEDIASTINUM: No mass or lymphadenopathy. MARA: No mass or lymphadenopathy. THORACIC AORTA: Aortic atherosclerosis HEART: Normal in size. ESOPHAGUS: No wall thickening or dilatation. TRACHEA/BRONCHI: Patent. PLEURA: Trace bilateral pleural effusions LUNGS: Multi lobar bilateral airspace disease, most severely involving both upper lobes, with mild additional right lower lobe involvement. Involvement is both consolidation and groundglass opacity UPPER ABDOMEN: Partially imaged. No acute pathology. BONES: No aggressive bone lesion or fracture. 1. No evidence of pulmonary embolism. 2. Multilobar pneumonia, most severely involving both upper lobes, with some additional right lower lobe involvement. 3. Trace bilateral pleural effusions. XR CHEST (2 VW)    Result Date: 10/26/2023  Indication:  Acute cough Exam: PA and lateral views of the chest. Direct comparison is made to prior CXR dated 9/2023. Findings: Cardiomediastinal silhouette is within normal limits. Lungs are clear bilaterally. Pleural spaces are normal. Osseous structures are intact. No acute cardiopulmonary disease.        Marley Olszewski, MD [facility]

## 2023-11-08 ENCOUNTER — ANESTHESIA (OUTPATIENT)
Facility: HOSPITAL | Age: 73
DRG: 871 | End: 2023-11-08
Payer: MEDICARE

## 2023-11-08 ENCOUNTER — HOSPITAL ENCOUNTER (OUTPATIENT)
Facility: HOSPITAL | Age: 73
Discharge: HOME | End: 2023-11-10
Attending: THORACIC SURGERY (CARDIOTHORACIC VASCULAR SURGERY)

## 2023-11-08 VITALS
HEART RATE: 93 BPM | TEMPERATURE: 99.9 F | BODY MASS INDEX: 28.34 KG/M2 | DIASTOLIC BLOOD PRESSURE: 38 MMHG | WEIGHT: 166.01 LBS | OXYGEN SATURATION: 95 % | HEIGHT: 64 IN | SYSTOLIC BLOOD PRESSURE: 135 MMHG | RESPIRATION RATE: 29 BRPM

## 2023-11-08 PROBLEM — Z98.890 HISTORY OF LUMBAR LAMINECTOMY: Status: ACTIVE | Noted: 2023-11-08

## 2023-11-08 PROBLEM — Z98.1 HISTORY OF FUSION OF CERVICAL SPINE: Status: ACTIVE | Noted: 2023-11-08

## 2023-11-08 PROBLEM — R65.20 SEVERE SEPSIS (HCC): Status: ACTIVE | Noted: 2023-11-01

## 2023-11-08 PROBLEM — R57.0 CARDIOGENIC SHOCK (HCC): Status: ACTIVE | Noted: 2023-11-08

## 2023-11-08 PROBLEM — N18.31 STAGE 3A CHRONIC KIDNEY DISEASE (HCC): Status: ACTIVE | Noted: 2023-11-08

## 2023-11-08 PROBLEM — I05.9 ENDOCARDITIS OF MITRAL VALVE: Status: ACTIVE | Noted: 2023-11-08

## 2023-11-08 PROBLEM — I35.8 AORTIC VALVE ENDOCARDITIS: Status: ACTIVE | Noted: 2023-11-08

## 2023-11-08 PROBLEM — B95.2 ENTEROCOCCAL BACTEREMIA: Status: ACTIVE | Noted: 2023-11-02

## 2023-11-08 PROBLEM — N17.9 AKI (ACUTE KIDNEY INJURY) (HCC): Status: ACTIVE | Noted: 2023-11-08

## 2023-11-08 PROBLEM — J80 ARDS (ADULT RESPIRATORY DISTRESS SYNDROME) (HCC): Status: ACTIVE | Noted: 2023-11-08

## 2023-11-08 PROBLEM — I34.0 SEVERE MITRAL REGURGITATION: Status: ACTIVE | Noted: 2023-11-08

## 2023-11-08 PROBLEM — I35.1 SEVERE AORTIC REGURGITATION: Status: ACTIVE | Noted: 2023-11-08

## 2023-11-08 LAB
ABO + RH BLD: NORMAL
ALBUMIN SERPL-MCNC: 2.6 G/DL (ref 3.5–5)
ALBUMIN/GLOB SERPL: 1 (ref 1.1–2.2)
ALP SERPL-CCNC: 54 U/L (ref 45–117)
ALT SERPL-CCNC: 93 U/L (ref 12–78)
ANION GAP SERPL CALC-SCNC: 3 MMOL/L (ref 5–15)
AST SERPL-CCNC: 51 U/L (ref 15–37)
BASOPHILS # BLD: 0 K/UL (ref 0–0.1)
BASOPHILS NFR BLD: 0 % (ref 0–1)
BILIRUB SERPL-MCNC: 2.5 MG/DL (ref 0.2–1)
BLD PROD TYP BPU: NORMAL
BLOOD BANK DISPENSE STATUS: NORMAL
BLOOD GROUP ANTIBODIES SERPL: NORMAL
BPU ID: NORMAL
BUN SERPL-MCNC: 79 MG/DL (ref 6–20)
BUN/CREAT SERPL: 40 (ref 12–20)
CALCIUM SERPL-MCNC: 7.9 MG/DL (ref 8.5–10.1)
CHLORIDE SERPL-SCNC: 113 MMOL/L (ref 97–108)
CO2 SERPL-SCNC: 27 MMOL/L (ref 21–32)
CREAT SERPL-MCNC: 2 MG/DL (ref 0.55–1.02)
CROSSMATCH RESULT: NORMAL
DIFFERENTIAL METHOD BLD: ABNORMAL
ECHO AR MAX VEL PISA: 3.9 M/S
ECHO AV REGURGITANT PHT: 190.1 MILLISECOND
ECHO BSA: 1.84 M2
ECHO BSA: 1.84 M2
ECHO MV REGURGITANT ALIASING (NYQUIST) VELOCITY: 38 CM/S
ECHO MV REGURGITANT VELOCITY PISA: 5.3 M/S
ECHO MV REGURGITANT VTIA: 142 CM
EKG ATRIAL RATE: 99 BPM
EKG DIAGNOSIS: NORMAL
EKG P AXIS: 49 DEGREES
EKG P-R INTERVAL: 172 MS
EKG Q-T INTERVAL: 320 MS
EKG QRS DURATION: 68 MS
EKG QTC CALCULATION (BAZETT): 410 MS
EKG R AXIS: 15 DEGREES
EKG T AXIS: 34 DEGREES
EKG VENTRICULAR RATE: 99 BPM
EOSINOPHIL # BLD: 0 K/UL (ref 0–0.4)
EOSINOPHIL NFR BLD: 0 % (ref 0–7)
ERYTHROCYTE [DISTWIDTH] IN BLOOD BY AUTOMATED COUNT: 16.7 % (ref 11.5–14.5)
GLOBULIN SER CALC-MCNC: 2.6 G/DL (ref 2–4)
GLUCOSE BLD STRIP.AUTO-MCNC: 122 MG/DL (ref 65–117)
GLUCOSE BLD STRIP.AUTO-MCNC: 154 MG/DL (ref 65–117)
GLUCOSE SERPL-MCNC: 145 MG/DL (ref 65–100)
HCT VFR BLD AUTO: 26.6 % (ref 35–47)
HGB BLD-MCNC: 8.7 G/DL (ref 11.5–16)
HISTORY CHECK: NORMAL
IMM GRANULOCYTES # BLD AUTO: 0 K/UL (ref 0–0.04)
IMM GRANULOCYTES NFR BLD AUTO: 0 % (ref 0–0.5)
INR PPP: 1.4 (ref 0.9–1.1)
LACTATE SERPL-SCNC: 0.8 MMOL/L (ref 0.4–2)
LYMPHOCYTES # BLD: 1.5 K/UL (ref 0.8–3.5)
LYMPHOCYTES NFR BLD: 13 % (ref 12–49)
MAGNESIUM SERPL-MCNC: 2.7 MG/DL (ref 1.6–2.4)
MCH RBC QN AUTO: 28.9 PG (ref 26–34)
MCHC RBC AUTO-ENTMCNC: 32.7 G/DL (ref 30–36.5)
MCV RBC AUTO: 88.4 FL (ref 80–99)
MONOCYTES # BLD: 0.1 K/UL (ref 0–1)
MONOCYTES NFR BLD: 1 % (ref 5–13)
MYELOCYTES NFR BLD MANUAL: 1 %
NEUTS SEG # BLD: 9.7 K/UL (ref 1.8–8)
NEUTS SEG NFR BLD: 85 % (ref 32–75)
NRBC # BLD: 0.11 K/UL (ref 0–0.01)
NRBC BLD-RTO: 1 PER 100 WBC
PHOSPHATE SERPL-MCNC: 4.2 MG/DL (ref 2.6–4.7)
PLATELET # BLD AUTO: 177 K/UL (ref 150–400)
PMV BLD AUTO: 10.6 FL (ref 8.9–12.9)
POTASSIUM SERPL-SCNC: 4.9 MMOL/L (ref 3.5–5.1)
PROT SERPL-MCNC: 5.2 G/DL (ref 6.4–8.2)
PROTHROMBIN TIME: 13.9 SEC (ref 9–11.1)
RBC # BLD AUTO: 3.01 M/UL (ref 3.8–5.2)
RBC MORPH BLD: ABNORMAL
SERVICE CMNT-IMP: ABNORMAL
SERVICE CMNT-IMP: ABNORMAL
SODIUM SERPL-SCNC: 143 MMOL/L (ref 136–145)
SPECIMEN EXP DATE BLD: NORMAL
UNIT DIVISION: 0
WBC # BLD AUTO: 11.4 K/UL (ref 3.6–11)

## 2023-11-08 PROCEDURE — 6370000000 HC RX 637 (ALT 250 FOR IP)

## 2023-11-08 PROCEDURE — 87116 MYCOBACTERIA CULTURE: CPT

## 2023-11-08 PROCEDURE — 6370000000 HC RX 637 (ALT 250 FOR IP): Performed by: HOSPITALIST

## 2023-11-08 PROCEDURE — C1768 GRAFT, VASCULAR: HCPCS | Performed by: THORACIC SURGERY (CARDIOTHORACIC VASCULAR SURGERY)

## 2023-11-08 PROCEDURE — 2580000003 HC RX 258: Performed by: INTERNAL MEDICINE

## 2023-11-08 PROCEDURE — 2580000003 HC RX 258: Performed by: HOSPITALIST

## 2023-11-08 PROCEDURE — 2500000003 HC RX 250 WO HCPCS

## 2023-11-08 PROCEDURE — 87077 CULTURE AEROBIC IDENTIFY: CPT

## 2023-11-08 PROCEDURE — 2100000001 HC CVICU R&B

## 2023-11-08 PROCEDURE — 6360000002 HC RX W HCPCS: Performed by: THORACIC SURGERY (CARDIOTHORACIC VASCULAR SURGERY)

## 2023-11-08 PROCEDURE — 87075 CULTR BACTERIA EXCEPT BLOOD: CPT

## 2023-11-08 PROCEDURE — 85027 COMPLETE CBC AUTOMATED: CPT

## 2023-11-08 PROCEDURE — 2580000003 HC RX 258: Performed by: NURSE PRACTITIONER

## 2023-11-08 PROCEDURE — 3700000000 HC ANESTHESIA ATTENDED CARE: Performed by: THORACIC SURGERY (CARDIOTHORACIC VASCULAR SURGERY)

## 2023-11-08 PROCEDURE — 87070 CULTURE OTHR SPECIMN AEROBIC: CPT

## 2023-11-08 PROCEDURE — 02RF08Z REPLACEMENT OF AORTIC VALVE WITH ZOOPLASTIC TISSUE, OPEN APPROACH: ICD-10-PCS | Performed by: THORACIC SURGERY (CARDIOTHORACIC VASCULAR SURGERY)

## 2023-11-08 PROCEDURE — 2580000003 HC RX 258

## 2023-11-08 PROCEDURE — 33405 REPLACEMENT AORTIC VALVE OPN: CPT | Performed by: THORACIC SURGERY (CARDIOTHORACIC VASCULAR SURGERY)

## 2023-11-08 PROCEDURE — 2720000010 HC SURG SUPPLY STERILE: Performed by: THORACIC SURGERY (CARDIOTHORACIC VASCULAR SURGERY)

## 2023-11-08 PROCEDURE — 87205 SMEAR GRAM STAIN: CPT

## 2023-11-08 PROCEDURE — 3600000008 HC SURGERY OHS BASE: Performed by: THORACIC SURGERY (CARDIOTHORACIC VASCULAR SURGERY)

## 2023-11-08 PROCEDURE — 85610 PROTHROMBIN TIME: CPT

## 2023-11-08 PROCEDURE — 83605 ASSAY OF LACTIC ACID: CPT

## 2023-11-08 PROCEDURE — 87186 SC STD MICRODIL/AGAR DIL: CPT

## 2023-11-08 PROCEDURE — C1713 ANCHOR/SCREW BN/BN,TIS/BN: HCPCS | Performed by: THORACIC SURGERY (CARDIOTHORACIC VASCULAR SURGERY)

## 2023-11-08 PROCEDURE — 02RG08Z REPLACEMENT OF MITRAL VALVE WITH ZOOPLASTIC TISSUE, OPEN APPROACH: ICD-10-PCS | Performed by: THORACIC SURGERY (CARDIOTHORACIC VASCULAR SURGERY)

## 2023-11-08 PROCEDURE — 3700000001 HC ADD 15 MINUTES (ANESTHESIA): Performed by: THORACIC SURGERY (CARDIOTHORACIC VASCULAR SURGERY)

## 2023-11-08 PROCEDURE — 3600000018 HC SURGERY OHS ADDTL 15MIN: Performed by: THORACIC SURGERY (CARDIOTHORACIC VASCULAR SURGERY)

## 2023-11-08 PROCEDURE — 94640 AIRWAY INHALATION TREATMENT: CPT

## 2023-11-08 PROCEDURE — 33430 REPLACEMENT OF MITRAL VALVE: CPT | Performed by: THORACIC SURGERY (CARDIOTHORACIC VASCULAR SURGERY)

## 2023-11-08 PROCEDURE — 2500000003 HC RX 250 WO HCPCS: Performed by: INTERNAL MEDICINE

## 2023-11-08 PROCEDURE — P9016 RBC LEUKOCYTES REDUCED: HCPCS

## 2023-11-08 PROCEDURE — 2780000006 HC MISC HEART VALVE: Performed by: THORACIC SURGERY (CARDIOTHORACIC VASCULAR SURGERY)

## 2023-11-08 PROCEDURE — C1729 CATH, DRAINAGE: HCPCS | Performed by: THORACIC SURGERY (CARDIOTHORACIC VASCULAR SURGERY)

## 2023-11-08 PROCEDURE — 36415 COLL VENOUS BLD VENIPUNCTURE: CPT

## 2023-11-08 PROCEDURE — 5A1221Z PERFORMANCE OF CARDIAC OUTPUT, CONTINUOUS: ICD-10-PCS | Performed by: THORACIC SURGERY (CARDIOTHORACIC VASCULAR SURGERY)

## 2023-11-08 PROCEDURE — 6370000000 HC RX 637 (ALT 250 FOR IP): Performed by: INTERNAL MEDICINE

## 2023-11-08 PROCEDURE — 2709999900 HC NON-CHARGEABLE SUPPLY: Performed by: THORACIC SURGERY (CARDIOTHORACIC VASCULAR SURGERY)

## 2023-11-08 PROCEDURE — C1781 MESH (IMPLANTABLE): HCPCS | Performed by: THORACIC SURGERY (CARDIOTHORACIC VASCULAR SURGERY)

## 2023-11-08 PROCEDURE — 82962 GLUCOSE BLOOD TEST: CPT

## 2023-11-08 PROCEDURE — 6360000002 HC RX W HCPCS

## 2023-11-08 PROCEDURE — 87102 FUNGUS ISOLATION CULTURE: CPT

## 2023-11-08 PROCEDURE — 94003 VENT MGMT INPAT SUBQ DAY: CPT

## 2023-11-08 PROCEDURE — 84100 ASSAY OF PHOSPHORUS: CPT

## 2023-11-08 PROCEDURE — 2500000003 HC RX 250 WO HCPCS: Performed by: NURSE ANESTHETIST, CERTIFIED REGISTERED

## 2023-11-08 PROCEDURE — B24BZZ4 ULTRASONOGRAPHY OF HEART WITH AORTA, TRANSESOPHAGEAL: ICD-10-PCS | Performed by: THORACIC SURGERY (CARDIOTHORACIC VASCULAR SURGERY)

## 2023-11-08 PROCEDURE — 2580000003 HC RX 258: Performed by: NURSE ANESTHETIST, CERTIFIED REGISTERED

## 2023-11-08 PROCEDURE — C1889 IMPLANT/INSERT DEVICE, NOC: HCPCS | Performed by: THORACIC SURGERY (CARDIOTHORACIC VASCULAR SURGERY)

## 2023-11-08 PROCEDURE — 83735 ASSAY OF MAGNESIUM: CPT

## 2023-11-08 PROCEDURE — 87206 SMEAR FLUORESCENT/ACID STAI: CPT

## 2023-11-08 PROCEDURE — 80053 COMPREHEN METABOLIC PANEL: CPT

## 2023-11-08 DEVICE — PERI-GUARD REPAIR PATCH (PERI-GUARD) IS A BIOLOGIC TISSUE PREPARED FROM BOVINE PERICARDIUM CROSS-LINKED WITH GLUTARALDEHYDE. THE PERICARDIUM IS PROCURED FROM CATTLE ORIGINATING IN THE UNITED STATES. PERI-GUARD IS CHEMICALLY STERILIZED USING ETHANOL AND PROPYLENE OXIDE. PERI-GUARD IS TREATED WITH 1 MOLAR SODIUM HYDROXIDE FOR 60-75 MINUTES AT 20-25C.
Type: IMPLANTABLE DEVICE | Site: HEART | Status: FUNCTIONAL
Brand: PERI-GUARD

## 2023-11-08 DEVICE — INSPIRIS RESILIA AORTIC VALVE
Type: IMPLANTABLE DEVICE | Site: HEART | Status: FUNCTIONAL
Brand: INSPIRIS RESILIA AORTIC VALVE

## 2023-11-08 DEVICE — 10 ML SYRINGE, STANDARD APPLICATOR TIPS, AND SYRINGE SPREADER TIPS
Type: IMPLANTABLE DEVICE | Site: HEART | Status: FUNCTIONAL
Brand: BIOGLUE SURGICAL ADHESIVE

## 2023-11-08 DEVICE — COR-KNOT® QUICK LOAD® 6-POUCH
Type: IMPLANTABLE DEVICE | Site: HEART | Status: FUNCTIONAL
Brand: COR-KNOT® QUICK LOAD®

## 2023-11-08 DEVICE — COR-KNOT MINI® COMBO KIT
Type: IMPLANTABLE DEVICE | Site: HEART | Status: FUNCTIONAL
Brand: COR-KNOT MINI®

## 2023-11-08 DEVICE — COR-KNOT® QUICK LOAD® SINGLES
Type: IMPLANTABLE DEVICE | Site: HEART | Status: FUNCTIONAL
Brand: COR-KNOT® QUICK LOAD®

## 2023-11-08 RX ORDER — SODIUM CHLORIDE 0.9 % (FLUSH) 0.9 %
5-40 SYRINGE (ML) INJECTION EVERY 12 HOURS SCHEDULED
Status: CANCELLED | OUTPATIENT
Start: 2023-11-08

## 2023-11-08 RX ORDER — INSULIN LISPRO 100 [IU]/ML
1-20 INJECTION, SOLUTION INTRAVENOUS; SUBCUTANEOUS
Status: CANCELLED | OUTPATIENT
Start: 2023-11-10

## 2023-11-08 RX ORDER — INSULIN GLARGINE 100 [IU]/ML
1-50 INJECTION, SOLUTION SUBCUTANEOUS
Status: CANCELLED | OUTPATIENT
Start: 2023-11-08 | End: 2023-11-09

## 2023-11-08 RX ORDER — ALBUMIN, HUMAN INJ 5% 5 %
12.5 SOLUTION INTRAVENOUS
Status: CANCELLED | OUTPATIENT
Start: 2023-11-08 | End: 2023-11-08

## 2023-11-08 RX ORDER — INSULIN LISPRO 100 [IU]/ML
0-12 INJECTION, SOLUTION INTRAVENOUS; SUBCUTANEOUS
Status: CANCELLED | OUTPATIENT
Start: 2023-11-08

## 2023-11-08 RX ORDER — MIDAZOLAM HYDROCHLORIDE 1 MG/ML
1 INJECTION, SOLUTION INTRAMUSCULAR; INTRAVENOUS
Status: CANCELLED | OUTPATIENT
Start: 2023-11-08

## 2023-11-08 RX ORDER — OXYCODONE HYDROCHLORIDE 5 MG/1
5 TABLET ORAL EVERY 4 HOURS PRN
Status: CANCELLED | OUTPATIENT
Start: 2023-11-08

## 2023-11-08 RX ORDER — SODIUM CHLORIDE 450 MG/100ML
INJECTION, SOLUTION INTRAVENOUS CONTINUOUS
Status: CANCELLED | OUTPATIENT
Start: 2023-11-08

## 2023-11-08 RX ORDER — HYDRALAZINE HYDROCHLORIDE 20 MG/ML
10 INJECTION INTRAMUSCULAR; INTRAVENOUS EVERY 6 HOURS PRN
Status: CANCELLED | OUTPATIENT
Start: 2023-11-08

## 2023-11-08 RX ORDER — NOREPINEPHRINE BITARTRATE 0.06 MG/ML
1-100 INJECTION, SOLUTION INTRAVENOUS CONTINUOUS
Status: CANCELLED | OUTPATIENT
Start: 2023-11-08

## 2023-11-08 RX ORDER — SODIUM CHLORIDE 9 MG/ML
INJECTION, SOLUTION INTRAVENOUS PRN
Status: DISCONTINUED | OUTPATIENT
Start: 2023-11-08 | End: 2023-11-09 | Stop reason: HOSPADM

## 2023-11-08 RX ORDER — POLYETHYLENE GLYCOL 3350 17 G/17G
17 POWDER, FOR SOLUTION ORAL DAILY
Status: CANCELLED | OUTPATIENT
Start: 2023-11-08

## 2023-11-08 RX ORDER — LANOLIN ALCOHOL/MO/W.PET/CERES
400 CREAM (GRAM) TOPICAL 2 TIMES DAILY
Status: CANCELLED | OUTPATIENT
Start: 2023-11-09

## 2023-11-08 RX ORDER — BISACODYL 10 MG
10 SUPPOSITORY, RECTAL RECTAL DAILY PRN
Status: CANCELLED | OUTPATIENT
Start: 2023-11-08

## 2023-11-08 RX ORDER — CEFAZOLIN SODIUM 1 G/3ML
INJECTION, POWDER, FOR SOLUTION INTRAMUSCULAR; INTRAVENOUS PRN
Status: DISCONTINUED | OUTPATIENT
Start: 2023-11-08 | End: 2023-11-08 | Stop reason: SDUPTHER

## 2023-11-08 RX ORDER — FENTANYL CITRATE 50 UG/ML
INJECTION, SOLUTION INTRAMUSCULAR; INTRAVENOUS CONTINUOUS PRN
Status: DISCONTINUED | OUTPATIENT
Start: 2023-11-08 | End: 2023-11-08

## 2023-11-08 RX ORDER — AMIODARONE HYDROCHLORIDE 200 MG/1
400 TABLET ORAL 2 TIMES DAILY
Status: CANCELLED | OUTPATIENT
Start: 2023-11-09

## 2023-11-08 RX ORDER — ACETAMINOPHEN 500 MG
1000 TABLET ORAL EVERY 6 HOURS SCHEDULED
Status: CANCELLED | OUTPATIENT
Start: 2023-11-08

## 2023-11-08 RX ORDER — OXYCODONE HYDROCHLORIDE 5 MG/1
10 TABLET ORAL EVERY 4 HOURS PRN
Status: CANCELLED | OUTPATIENT
Start: 2023-11-08

## 2023-11-08 RX ORDER — FENTANYL CITRATE 50 UG/ML
INJECTION, SOLUTION INTRAMUSCULAR; INTRAVENOUS PRN
Status: DISCONTINUED | OUTPATIENT
Start: 2023-11-08 | End: 2023-11-08 | Stop reason: SDUPTHER

## 2023-11-08 RX ORDER — DIPHENHYDRAMINE HCL 25 MG
25 CAPSULE ORAL NIGHTLY PRN
Status: CANCELLED | OUTPATIENT
Start: 2023-11-09

## 2023-11-08 RX ORDER — DOBUTAMINE HYDROCHLORIDE 200 MG/100ML
2.5-1 INJECTION INTRAVENOUS CONTINUOUS
Status: CANCELLED | OUTPATIENT
Start: 2023-11-08

## 2023-11-08 RX ORDER — MAGNESIUM SULFATE IN WATER 40 MG/ML
2000 INJECTION, SOLUTION INTRAVENOUS PRN
Status: CANCELLED | OUTPATIENT
Start: 2023-11-08

## 2023-11-08 RX ORDER — HYDROMORPHONE HYDROCHLORIDE 1 MG/ML
0.5 INJECTION, SOLUTION INTRAMUSCULAR; INTRAVENOUS; SUBCUTANEOUS EVERY 4 HOURS PRN
Status: CANCELLED | OUTPATIENT
Start: 2023-11-08

## 2023-11-08 RX ORDER — HEPARIN SODIUM 1000 [USP'U]/ML
INJECTION, SOLUTION INTRAVENOUS; SUBCUTANEOUS PRN
Status: DISCONTINUED | OUTPATIENT
Start: 2023-11-08 | End: 2023-11-08 | Stop reason: SDUPTHER

## 2023-11-08 RX ORDER — ALBUTEROL SULFATE 2.5 MG/3ML
2.5 SOLUTION RESPIRATORY (INHALATION) EVERY 4 HOURS PRN
Status: CANCELLED | OUTPATIENT
Start: 2023-11-08

## 2023-11-08 RX ORDER — FAMOTIDINE 20 MG/1
20 TABLET, FILM COATED ORAL 2 TIMES DAILY
Status: CANCELLED | OUTPATIENT
Start: 2023-11-08 | End: 2023-11-13

## 2023-11-08 RX ORDER — DEXTROSE MONOHYDRATE 100 MG/ML
INJECTION, SOLUTION INTRAVENOUS CONTINUOUS PRN
Status: CANCELLED | OUTPATIENT
Start: 2023-11-08

## 2023-11-08 RX ORDER — ROCURONIUM BROMIDE 10 MG/ML
INJECTION, SOLUTION INTRAVENOUS PRN
Status: DISCONTINUED | OUTPATIENT
Start: 2023-11-08 | End: 2023-11-08 | Stop reason: SDUPTHER

## 2023-11-08 RX ORDER — PHENYLEPHRINE HCL IN 0.9% NACL 0.4MG/10ML
SYRINGE (ML) INTRAVENOUS PRN
Status: DISCONTINUED | OUTPATIENT
Start: 2023-11-08 | End: 2023-11-08 | Stop reason: SDUPTHER

## 2023-11-08 RX ORDER — BUPIVACAINE HYDROCHLORIDE 5 MG/ML
INJECTION, SOLUTION EPIDURAL; INTRACAUDAL PRN
Status: DISCONTINUED | OUTPATIENT
Start: 2023-11-08 | End: 2023-11-08 | Stop reason: ALTCHOICE

## 2023-11-08 RX ORDER — LIDOCAINE 4 G/G
2 PATCH TOPICAL DAILY
Status: CANCELLED | OUTPATIENT
Start: 2023-11-08

## 2023-11-08 RX ORDER — CHLORHEXIDINE GLUCONATE ORAL RINSE 1.2 MG/ML
15 SOLUTION DENTAL 2 TIMES DAILY
Status: CANCELLED | OUTPATIENT
Start: 2023-11-08

## 2023-11-08 RX ORDER — ATORVASTATIN CALCIUM 40 MG/1
40 TABLET, FILM COATED ORAL NIGHTLY
Status: CANCELLED | OUTPATIENT
Start: 2023-11-08

## 2023-11-08 RX ORDER — ONDANSETRON 2 MG/ML
4 INJECTION INTRAMUSCULAR; INTRAVENOUS EVERY 4 HOURS PRN
Status: CANCELLED | OUTPATIENT
Start: 2023-11-08

## 2023-11-08 RX ORDER — POTASSIUM CHLORIDE 29.8 MG/ML
20 INJECTION INTRAVENOUS PRN
Status: CANCELLED | OUTPATIENT
Start: 2023-11-08

## 2023-11-08 RX ORDER — ASPIRIN 81 MG/1
81 TABLET ORAL DAILY
Status: CANCELLED | OUTPATIENT
Start: 2023-11-08

## 2023-11-08 RX ORDER — INSULIN LISPRO 100 [IU]/ML
0-6 INJECTION, SOLUTION INTRAVENOUS; SUBCUTANEOUS NIGHTLY
Status: CANCELLED | OUTPATIENT
Start: 2023-11-08

## 2023-11-08 RX ORDER — SODIUM CHLORIDE 9 MG/ML
INJECTION, SOLUTION INTRAVENOUS CONTINUOUS
Status: CANCELLED | OUTPATIENT
Start: 2023-11-08

## 2023-11-08 RX ORDER — SODIUM CHLORIDE 0.9 % (FLUSH) 0.9 %
5-40 SYRINGE (ML) INJECTION PRN
Status: CANCELLED | OUTPATIENT
Start: 2023-11-08

## 2023-11-08 RX ORDER — SENNA AND DOCUSATE SODIUM 50; 8.6 MG/1; MG/1
1 TABLET, FILM COATED ORAL 2 TIMES DAILY
Status: CANCELLED | OUTPATIENT
Start: 2023-11-08

## 2023-11-08 RX ADMIN — AMINOCAPROIC ACID 10 G/HR: 250 INJECTION, SOLUTION INTRAVENOUS at 08:12

## 2023-11-08 RX ADMIN — SODIUM CHLORIDE 2.5 MG/HR: 9 INJECTION, SOLUTION INTRAVENOUS at 15:15

## 2023-11-08 RX ADMIN — Medication 50 MCG: at 09:22

## 2023-11-08 RX ADMIN — ACETAMINOPHEN 650 MG: 325 TABLET ORAL at 04:18

## 2023-11-08 RX ADMIN — SODIUM CHLORIDE 0.63 UNITS/HR: 9 INJECTION, SOLUTION INTRAVENOUS at 08:34

## 2023-11-08 RX ADMIN — PROPOFOL 30 MG: 10 INJECTION, EMULSION INTRAVENOUS at 07:31

## 2023-11-08 RX ADMIN — FENTANYL CITRATE 250 MCG: 50 INJECTION INTRAMUSCULAR; INTRAVENOUS at 08:44

## 2023-11-08 RX ADMIN — SODIUM CHLORIDE: 9 INJECTION, SOLUTION INTRAVENOUS at 08:12

## 2023-11-08 RX ADMIN — CEFAZOLIN 2 G: 1 INJECTION, POWDER, FOR SOLUTION INTRAMUSCULAR; INTRAVENOUS at 08:20

## 2023-11-08 RX ADMIN — Medication 100 MCG: at 12:59

## 2023-11-08 RX ADMIN — PROPOFOL 30 MG: 10 INJECTION, EMULSION INTRAVENOUS at 12:35

## 2023-11-08 RX ADMIN — Medication 100 MCG: at 09:12

## 2023-11-08 RX ADMIN — FENTANYL CITRATE 100 MCG: 50 INJECTION INTRAMUSCULAR; INTRAVENOUS at 15:08

## 2023-11-08 RX ADMIN — DEXMEDETOMIDINE 0.6 MCG/KG/HR: 100 INJECTION, SOLUTION INTRAVENOUS at 02:29

## 2023-11-08 RX ADMIN — EPINEPHRINE 1 MCG/MIN: 1 INJECTION INTRAMUSCULAR; INTRAVENOUS; SUBCUTANEOUS at 12:28

## 2023-11-08 RX ADMIN — HEPARIN SODIUM 31000 UNITS: 1000 INJECTION INTRAVENOUS; SUBCUTANEOUS at 08:48

## 2023-11-08 RX ADMIN — AMINOCAPROIC ACID 1 G/HR: 250 INJECTION, SOLUTION INTRAVENOUS at 14:43

## 2023-11-08 RX ADMIN — PROPOFOL 100 MG: 10 INJECTION, EMULSION INTRAVENOUS at 16:06

## 2023-11-08 RX ADMIN — PROPOFOL 30 MG: 10 INJECTION, EMULSION INTRAVENOUS at 11:32

## 2023-11-08 RX ADMIN — ROCURONIUM BROMIDE 40 MG: 10 INJECTION INTRAVENOUS at 09:30

## 2023-11-08 RX ADMIN — ROCURONIUM BROMIDE 40 MG: 10 INJECTION INTRAVENOUS at 14:17

## 2023-11-08 RX ADMIN — Medication 100 MCG: at 09:18

## 2023-11-08 RX ADMIN — IPRATROPIUM BROMIDE AND ALBUTEROL SULFATE 1 DOSE: 2.5; .5 SOLUTION RESPIRATORY (INHALATION) at 03:59

## 2023-11-08 RX ADMIN — ROCURONIUM BROMIDE 100 MG: 10 INJECTION INTRAVENOUS at 07:34

## 2023-11-08 RX ADMIN — CEFAZOLIN 2 G: 1 INJECTION, POWDER, FOR SOLUTION INTRAMUSCULAR; INTRAVENOUS at 11:12

## 2023-11-08 RX ADMIN — Medication 100 MCG: at 07:46

## 2023-11-08 RX ADMIN — FENTANYL CITRATE 100 MCG/HR: 50 INJECTION, SOLUTION INTRAMUSCULAR; INTRAVENOUS at 08:12

## 2023-11-08 RX ADMIN — CEFAZOLIN 2 G: 1 INJECTION, POWDER, FOR SOLUTION INTRAMUSCULAR; INTRAVENOUS at 14:01

## 2023-11-08 RX ADMIN — VASOPRESSIN 1 UNITS/MIN: 20 INJECTION INTRAVENOUS at 13:00

## 2023-11-08 RX ADMIN — PROPOFOL 30 MG: 10 INJECTION, EMULSION INTRAVENOUS at 09:05

## 2023-11-08 RX ADMIN — DEXMEDETOMIDINE HYDROCHLORIDE 0.5 MCG/KG/HR: 100 INJECTION, SOLUTION, CONCENTRATE INTRAVENOUS at 11:48

## 2023-11-08 RX ADMIN — SODIUM CHLORIDE, PRESERVATIVE FREE 10 ML: 5 INJECTION INTRAVENOUS at 04:22

## 2023-11-08 RX ADMIN — FENTANYL CITRATE 150 MCG: 50 INJECTION INTRAMUSCULAR; INTRAVENOUS at 16:06

## 2023-11-08 ASSESSMENT — PAIN SCALES - GENERAL
PAINLEVEL_OUTOF10: 0
PAINLEVEL_OUTOF10: 0

## 2023-11-08 ASSESSMENT — PULMONARY FUNCTION TESTS: PIF_VALUE: 9.02

## 2023-11-08 NOTE — ANESTHESIA POSTPROCEDURE EVALUATION
Post-Anesthesia Evaluation and Assessment    Patient: Justin Bishop MRN: [id number]  SSN: [id number]    YOB: 1950  Age: 68 y.o. Sex: female      Patient is status post General anesthesia for Procedure(s):  MITRAL VALVE REPLACEMENT (ZABALA MITRIS 27MM TISSUE MITRAL VALVE), AORTIC VALVE REPLACEMENT (ZABALA INSPIRIS RESILIA 23MM TISSUE AORTIC VALVE), WITH CARDIOPLEGIA, KARISSA AND EPI-AORTIC ULTRASOUND BY DR Ricky Benson. .    Patient  in OR from un-repairable AV groove rupture.      Surgical complication: death    Signed By: Moraima Zhang MD     2023

## 2023-11-08 NOTE — BRIEF OP NOTE
Brief Postoperative Note      Patient: Erasmo Granda  YOB: 1950  MRN: [id number]    Date of Procedure: 2023             Procedure(s):  MITRAL VALVE REPLACEMENT (ZABALA MITRIS 27MM TISSUE MITRAL VALVE), AORTIC VALVE REPLACEMENT (ZABALA INSPIRIS RESILIA 23MM TISSUE AORTIC VALVE), WITH CARDIOPLEGIA, KARISSA AND EPI-AORTIC ULTRASOUND BY DR Lory Daley. Surgeon(s):  Aniyah Fuller MD    Assistant:  Physician Assistant: Aram Oates PA-C    Anesthesia: General    Estimated Blood Loss (mL):     Complications: Other: Patient  on the OR table from complications of AV disruption, see full note    Specimens:   ID Type Source Tests Collected by Time Destination   A : Aortic Valve Tissue Heart CULTURE, ANAEROBIC, CULTURE, FUNGUS, GRAM STAIN, CULTURE, TISSUE, CULTURE WITH SMEAR, ACID FAST Basil Means MD 2023 9042    B : Mitral Valve Tissue Heart CULTURE, ANAEROBIC, CULTURE, FUNGUS, GRAM STAIN, CULTURE, TISSUE Aniyah Fuller MD 2023 1009        Implants:  Implant Name Type Inv.  Item Serial No.  Lot No. LRB No. Used Action   COR-KNOTS, Ref # [id number]   NA [employer]- [id number] N/A 2 Implanted   BioGlue Surgical Adhesive   NA [employer]- [id number] N/A 2 Implanted   COR-KNOTS    LSI SOLUTIONS-[employer] [id number] N/A 1 Implanted   Cor Knot (single pack)   NA [employer] [id number] N/A 2 Implanted   Cor Knots (6 pack)    LSI SOLUTIONS- [id number] N/A 1 Implanted   VALVE AORT 23MM REPL RESILIENT BOV PERICARD CO CHROMIUM ALLY - [id number] Aortic valves VALVE AORT 23MM REPL RESILIENT BOV PERICARD CO CHROMIUM ALLY [id number] ZABALA [employer]Ins[employer] ALVARO- NA N/A 1 Implanted   PATCH CV 8X14 CM SFT TISS BOV PERICARD ANDRAE-GUARD LF - S56 Vascular grafts PATCH CV 8X14 CM SFT TISS BOV PERICARD ANDRAE-GUARD LF 56 [employer]NE[employer]- [id number] N/A 1 Implanted         Drains:   [REMOVED] Closed/Suction Drain Inferior;Midline Chest Other (Comment) (Removed)       [REMOVED] NG/OG/NJ/NE

## 2023-11-08 NOTE — PROGRESS NOTES
Notified by Lacey Gaines RN that patient would not be returning to unit. This RN and Filippo Wilcox RN wasted 400 mcg (40mL) of Fentanyl.

## 2023-11-08 NOTE — PROGRESS NOTES
Cardiac Surgery Coordinator called and left a message for the family of Marlin Mederos, introduced role of the Cardiac Surgery Care Coordinator. Reviewed plan of care and day of surgery expectations. Provided family with an update from OR. Encouraged family to verbalize and emotional support given. Will continue to update throughout the day. 0930 - Met with family of Marlin Mederos, provided family with update of on by-pass. Family without questions or concerns at this time. Will continue to follow for educational and emotional needs.

## 2023-11-08 NOTE — ANESTHESIA PROCEDURE NOTES
Arterial Line:    An arterial line was placed using surface landmarks, in the OR for the following indication(s): continuous blood pressure monitoring and blood sampling needed. A 20 gauge (size) (length), Arrow (type) catheter was placed, Seldinger technique used, into the right brachial artery, secured by Tegaderm. Anesthesia type: Local  Local infiltration: Injection    Events:  patient tolerated procedure well with no complications.     Additional notes:  US [id number]/2023 7:30 AM  Anesthesiologist: Terra Dolan MD  Performed: Anesthesiologist   Preanesthetic Checklist  Completed: patient identified, IV checked, site marked, risks and benefits discussed, surgical/procedural consents, equipment checked, pre-op evaluation, timeout performed, anesthesia consent given, oxygen available, monitors applied/VS acknowledged and fire risk safety assessment completed and verbalized
Central Venous Line:    A central venous line was placed using ultrasound guidance and surface landmarks, in the pre-op for the following indication(s): central venous access and CVP monitoring. 11/8/2023 7:30 AM    Sterility preparation included the following: hand hygiene performed prior to procedure, maximum sterile barriers used and sterile technique used to drape from head to toe. The patient was placed in Trendelenburg position. The right internal jugular vein was prepped. The site was prepped with Chloraprep. A 9 Fr (size), 12 (length), introducer double lumen was placed. During the procedure, the following specific steps were taken: target vein identified, needle advanced into vein and blood aspirated and guidewire advanced into vein. Intravenous verification was obtained by ultrasound, venous blood return and manometry. Post insertion care included: all ports aspirated, all ports flushed easily, guidewire removed intact, Biopatch applied, line sutured in place and dressing applied. During the procedure the patient experienced: patient tolerated procedure well with no complications. Outcomes: uncomplicated  Anesthesia type: local.. No    Additional notes:  Exchanged over wire existing central line  Staffing  Performed: Anesthesiologist   Anesthesiologist: Mando Paiz MD  Performed by: Mando Paiz MD  Authorized by: Mando Paiz MD    Preanesthetic Checklist  Completed: patient identified, IV checked, site marked, risks and benefits discussed, surgical/procedural consents, equipment checked, pre-op evaluation, timeout performed, anesthesia consent given, oxygen available, monitors applied/VS acknowledged and fire risk safety assessment completed and verbalized
Procedure Performed: KARISSA       Start Time:  11/8/2023 4:44 PM       End Time:      Preanesthesia Checklist:  Patient identified, IV assessed, risks and benefits discussed, monitors and equipment assessed, procedure being performed at surgeon's request and anesthesia consent obtained. General Procedure Information  Diagnostic Indications for Echo:  assessment of ascending aorta, assessment of surgical repair, hemodynamic monitoring and assessment of valve function  Location performed:  OR  Intubated  Bite block placed  Heart visualized  Probe Insertion:  Easy  Probe Type:  3D, mulitplane, epiaortic and biplane  Modalities:  2D, 3D, color flow mapping, continuous wave Doppler, pulse wave Doppler and M-mode    Echocardiographic and Doppler Measurements    Ventricles    Right Ventricle:  Cavity size normal.  Hypertrophy not present. Thrombus not present. Global function normal.    Left Ventricle:  Cavity size normal.  Hypertrophy not present. Thrombus not present. Global Function normal.    Other Ventricular Findings:       LVEF >55%    Ventricular Regional Function:  1- Basal Anteroseptal:  normal  2- Basal Anterior:  normal  3- Basal Anterolateral:  normal  4- Basal Inferolateral:  normal  5- Basal Inferior:  normal  6- Basal Inferoseptal:  normal  7- Mid Anteroseptal:  normal  8- Mid Anterior:  normal  9- Mid Anterolateral:  normal  10- Mid Inferolateral:  normal  11- Mid Inferior:  normal  12- Mid Inferoseptal:  normal  13- Apical Anterior:  normal  14- Apical Lateral:  normal  15- Apical Inferior:  normal  16- Apical Septal:  normal  17- Loving:  normal    Wall Motion Comments:       No WMA    Valves    Aortic Valve: Annulus normal.  Stenosis not present. Regurgitation severe. Leaflets vegetative and perforated. Leaflet motions normal.      Mitral Valve: Annulus normal.  Stenosis not present. Regurgitation severe. Leaflets vegetative and perforated. Leaflet motions flail. Tricuspid Valve:   Annulus
applied/VS acknowledged and fire risk safety assessment completed and verbalized

## 2023-11-08 NOTE — PROGRESS NOTES
0000: TRANSFER - IN REPORT:    Verbal report received from LEANN Casas on [facility]  being received from CCU for ordered procedure      Report consisted of patient's Situation, Background, Assessment and   Recommendations(SBAR). Information from the following report(s) Nurse Handoff Report, Index, Adult Overview, Intake/Output, MAR, Recent Results, Med Rec Status, and Cardiac Rhythm NSR  was reviewed with the receiving nurse. Opportunity for questions and clarification was provided. Assessment completed upon patient's arrival to unit and care assumed. 0034: Admission assessment complete, see flow sheet for further details    0200: Pt not maintaining MAP goal >65, Bailey, NP notified, verbal orders to restart phenylephrine to achieve MAP goal    0400: Reassessment complete, pts temp 100.5, tylenol given, no other changes    0600: Temp now 99.9    0715: Bedside and Verbal shift change report given to Krista, 9725 Manuel Hummel RN (oncoming nurse) by Dilia Sr RN (offgoing nurse). Report included the following information Nurse Handoff Report, Adult Overview, Intake/Output, MAR, Recent Results, Med Rec Status, Cardiac Rhythm NSR, Pre Procedure Checklist, Quality Measures, and Neuro Assessment.

## 2023-11-08 NOTE — PERIOP NOTE
0745 - Skin feels very warm to touch, 38.6 C. Bilateral upper extremities are edemetous and the skin is thin and fragile with significant bruising. Left groin crease shows skin breakdown. Bilateral lower extremities show significant bruising on the shins. Patient's abdomen, hips, buttocks, and back show +3 pitting edema. Patient's heels are pink, but intact. Sacrum is pink, blanchable, but with a small fissure in the crease. Mipilex applied to sacrum and bilateral heels. 4163 - Stauffer removed and replaced. 4256 - Time Out discussion, including but not limited to the following:    Beta blocker is not required for this procedure per Dr. Awilda Bernheim. Patient's allergies and current antibiotic schedule discussed, decision made to give Ancef during the case. BSA 1.82 discussed for aortic valve sizing. Blood products and cardioplegia in the room. Fire risk is low, score 2.    1245 - Rewarming called to CVICU, spoke with Santo Cortez. 1608 - Time of death. 65 - Permission was obtained from ME to remove all lines and tubing from patient. Determined not to be an ME case. Post-mortem care performed and [place] [facility] [place]     Kell Carballo RN

## 2023-11-08 NOTE — PROGRESS NOTES
1900: Bedside shift change report given to LEANN Casas (oncoming nurse) by Gibran Dudley RN (offgoing nurse). Report included the following information Nurse Handoff Report, Intake/Output, MAR, Recent Results, and Cardiac Rhythm NSR .     2000: Assessment completed. Bilateral wrist restraints in place. Patient tolerating current ventilator settings. See MAR and flowsheet for more details. 2300: Discussed metoprolol order with FREDRICK Avalos. Orders received to hold. 0000: Reassessment completed. CHG bath completed. 0015: Bedside report given to Federico Ayers RN. Patient transferred to room 2401.

## 2023-11-09 LAB
ABO + RH BLD: NORMAL
BLD PROD TYP BPU: NORMAL
BLOOD BANK DISPENSE STATUS: NORMAL
BLOOD GROUP ANTIBODIES SERPL: NORMAL
BPU ID: NORMAL
CROSSMATCH RESULT: NORMAL
SPECIMEN EXP DATE BLD: NORMAL
UNIT DIVISION: 0

## 2023-11-09 NOTE — PROGRESS NOTES
received a daytime page from Surgery Dept Nurse in reference to the death of Mrs. Luz Marina Mckeon. The family is in the consult room and needs spiritual health care according to the nurse. The  was asked to wait for 15-20 minutes to give the staff a chance to move the family from the waiting room to the consult room. When the  arrived Palliative Care staff informed the  of the status of the family.  met with the  and two sons. Everyone was tearful and distraught.  prayed with them and hugged each family member. It is this writer's understanding this is an Medical Examiner's case therefore the family wasn't able to gather around the bed of the patient. Family at one point felt a need to take a walk and ultimately left heavy with grief.  tried to provide information for the death paperwork but the Palliative Staff said the nurse will take care of everything.  shared the process pastoral care department follow in providing care during a death.  was assured the nurse will handle everything by the palliative staff.  thanked everyone for their care of the patient and her family.  received a call later in the office in reference to the pertinent information needed for the death paperwork.  thanked Mark Anthony Anders RN for her care of the patient and family.  services are available 24 hours a day as requested. Rev. SANNA Tsai  [address]   Paging Service [facility] ([phone])

## 2023-11-10 NOTE — PROGRESS NOTES
Quality: Chart reviewed for death and restraints. Bilateral soft wrist restraints noted during hospitalization. Restraints not a contributing factor in patient death. Documented for tracking according to CMS guidelines at 1146 on 11/10/23.

## 2023-11-10 NOTE — OP NOTE
[facility]  OPERATIVE REPORT    Name:  Cassandra Ball  MR#:  [id number]  :  1950  ACCOUNT #:  [de-identified]  DATE OF SERVICE:  2023    PREOPERATIVE DIAGNOSES:  1. Acute aortic and mitral valve endocarditis. 2.  Severe atrial insufficiency. 3.  Severe mitral regurgitation. POSTOPERATIVE DIAGNOSES:  1. Acute aortic and mitral valve endocarditis. 2.  Severe atrial insufficiency. 3.  Severe mitral regurgitation. PROCEDURE PERFORMED:  1. Mitral valve replacement with #27 Barnett INSPIRIS bioprosthetic valve. 2.  Aortic replacement #23 Barnett INSPIRIS bioprosthetic valve. 3.  Patch reconstruction of atrioventricular groove defect. 4.  Epiaortic ultrasound. SURGEON:  Garth Sawant MD    ASSISTANT:  Robb Dillon PA-C    ANESTHESIA:  General endotracheal.    COMPLICATIONS:  AV groove disruption. SPECIMEN:  Aortic and mitral valve leaflet. IMPLANTS:  #23 Stacey Labs, #27 Barnett MITRIS.    ESTIMATED BLOOD LOSS:  500 mL. ANESTHESIOLOGIST:  Timmy Edgar. Donnal Alpers, MD    INDICATIONS:  The patient was referred to us in extremis with both severe aortic insufficiency and severe mitral regurgitation and found to have large vegetation on each valve. She was in acute pulmonary decompensation. She was taken to the operating room urgently for replacement of each valve. PROCEDURE:  She was prepped and draped in a sterile fashion. A time-out was performed. An incision was made over the sternum. *** was performed. Heparin was administered for cardiopulmonary bypass. Sternal retractor was placed. The pericardium was opened widely and laterally. Ascending aorta was inspected with my fingers as well as epiaortic ultrasound probe and had numerous deposit of calcium. We were able to cannulate cross clamp around this. We plicated***.   We cannulated as well as placed the antegrade and retrograde cardioplegia catheter as well as left ventricular vent to the right the left atrium. I then used a 15 blade and tonsil to remove all of the sutures and selected each of the pledgets from within the left ventricular cavity. The portion of the ventricle beneath the posterior annulus was completely disrupted from the atrium. The circumflex artery was also completely visible in the AV groove beneath the muscle, which had  from the atrium. The next step I took was to use a piece of bovine pericardium in attempts to place sutures along the atrium and also along the ventricular cavity to anchor this patch in place. All those sutures were placed in the more distal ventricle where there was more healthy appearing tissue. The tissue even in this area was so weak that the sutures continued to tear out. I was able to finally seat the patch and it continued to tear through the muscle at different points. The size of the defect was extremely large and the tissue below it was too weak and friable to hold the suture necessary to anchor the patch. At this point, the defect looked like it would not be survivable. I then scrubbed out again and discussed these findings with the family. The patient was then drained of blood to the cardiopulmonary bypass machine and was pronounced dead in the operating room. The PA was of assistance for every step of the case including the sternotomy, cannulation, decannulation, performance of the aortic valve, performance of mitral valve, reopening the atrium and construction of the atrioventricular groove patch.       MD LULA [id number]  D:  11/09/2023 13:32  T:  11/09/2023 23:42  JOB #:  [id number]

## 2023-11-11 LAB
ACID FAST STN SPEC: NEGATIVE
BACTERIA SPEC CULT: ABNORMAL
BACTERIA SPEC CULT: NORMAL
GRAM STN SPEC: ABNORMAL
MYCOBACTERIUM SPEC QL CULT: NORMAL
SERVICE CMNT-IMP: ABNORMAL
SERVICE CMNT-IMP: NORMAL
SPECIMEN PREPARATION: NORMAL
SPECIMEN SOURCE: NORMAL

## 2023-11-12 LAB
BACTERIA SPEC CULT: NORMAL
BACTERIA SPEC CULT: NORMAL
SERVICE CMNT-IMP: NORMAL
SERVICE CMNT-IMP: NORMAL

## 2023-11-13 LAB
BACTERIA SPEC CULT: NORMAL
SERVICE CMNT-IMP: NORMAL
